# Patient Record
Sex: FEMALE | Race: WHITE | NOT HISPANIC OR LATINO | Employment: FULL TIME | URBAN - METROPOLITAN AREA
[De-identification: names, ages, dates, MRNs, and addresses within clinical notes are randomized per-mention and may not be internally consistent; named-entity substitution may affect disease eponyms.]

---

## 2017-03-08 ENCOUNTER — OFFICE VISIT (OUTPATIENT)
Dept: INTERNAL MEDICINE | Facility: CLINIC | Age: 37
End: 2017-03-08

## 2017-03-08 VITALS
OXYGEN SATURATION: 98 % | SYSTOLIC BLOOD PRESSURE: 108 MMHG | HEART RATE: 82 BPM | WEIGHT: 127 LBS | RESPIRATION RATE: 18 BRPM | DIASTOLIC BLOOD PRESSURE: 70 MMHG | TEMPERATURE: 98.6 F | BODY MASS INDEX: 20.65 KG/M2

## 2017-03-08 DIAGNOSIS — D17.1 LIPOMA OF ANTERIOR CHEST WALL: ICD-10-CM

## 2017-03-08 DIAGNOSIS — G47.00 INSOMNIA, UNSPECIFIED TYPE: Primary | ICD-10-CM

## 2017-03-08 DIAGNOSIS — N64.4 BREAST PAIN, LEFT: ICD-10-CM

## 2017-03-08 PROCEDURE — 99213 OFFICE O/P EST LOW 20 MIN: CPT | Performed by: PHYSICIAN ASSISTANT

## 2017-03-08 RX ORDER — QUETIAPINE FUMARATE 100 MG/1
1 TABLET, FILM COATED ORAL
COMMUNITY
Start: 2015-11-03 | End: 2017-03-08 | Stop reason: SDUPTHER

## 2017-03-08 RX ORDER — QUETIAPINE FUMARATE 50 MG/1
100 TABLET, FILM COATED ORAL
Qty: 60 TABLET | Refills: 2 | Status: SHIPPED | OUTPATIENT
Start: 2017-03-08 | End: 2017-10-13 | Stop reason: SDUPTHER

## 2017-03-08 NOTE — PROGRESS NOTES
Subjective   Jeanne Bauman is a 36 y.o. female.   Chief Complaint   Patient presents with   • lipoma     left     History of Present Illness   Pt says that abdominal lipoma is starting to ache x several months and left side of body and left breast is aching.  It has not gotten larger.    Continues to take seroquel at night for insomnia.    The following portions of the patient's history were reviewed and updated as appropriate: allergies, current medications and problem list.    Review of Systems   Psychiatric/Behavioral: Positive for sleep disturbance.       Objective   Physical Exam   Constitutional: She appears well-developed and well-nourished.   HENT:   Head: Normocephalic and atraumatic.   Right Ear: External ear normal.   Left Ear: External ear normal.   Eyes: Conjunctivae are normal.   Cardiovascular: Normal rate, regular rhythm and normal heart sounds.  Exam reveals no gallop and no friction rub.    No murmur heard.  Pulmonary/Chest: Effort normal and breath sounds normal. Right breast exhibits no inverted nipple, no skin change and no tenderness. Left breast exhibits tenderness. Left breast exhibits no inverted nipple and no skin change. Breasts are asymmetrical.   Pt has prominent anterior lower ribs.  Lipoma over the left anterior ribs is soft, restricted to the skin.       Psychiatric: She has a normal mood and affect.   Vitals reviewed.      Assessment/Plan   Jeanne was seen today for lipoma.    Diagnoses and all orders for this visit:    Insomnia, unspecified type  -     QUEtiapine (SEROquel) 50 MG tablet; Take 2 tablets by mouth every night at bedtime.    Breast pain, left  -     Mammo diagnostic digital tomosynthesis left w CAD    Lipoma of anterior chest wall

## 2017-03-09 PROBLEM — D17.1 LIPOMA OF ANTERIOR CHEST WALL: Status: ACTIVE | Noted: 2017-03-09

## 2017-03-15 ENCOUNTER — HOSPITAL ENCOUNTER (OUTPATIENT)
Dept: MAMMOGRAPHY | Facility: HOSPITAL | Age: 37
Discharge: HOME | End: 2017-03-15
Admitting: PHYSICIAN ASSISTANT

## 2017-03-15 ENCOUNTER — TRANSCRIBE ORDERS (OUTPATIENT)
Dept: INTERNAL MEDICINE | Facility: CLINIC | Age: 37
End: 2017-03-15

## 2017-03-15 ENCOUNTER — HOSPITAL ENCOUNTER (OUTPATIENT)
Dept: ULTRASOUND IMAGING | Facility: HOSPITAL | Age: 37
Discharge: HOME | End: 2017-03-15

## 2017-03-15 DIAGNOSIS — R92.8 ABNORMAL MAMMOGRAM: Primary | ICD-10-CM

## 2017-03-15 DIAGNOSIS — N64.4 BREAST PAIN, LEFT: ICD-10-CM

## 2017-03-15 PROCEDURE — 76641 ULTRASOUND BREAST COMPLETE: CPT

## 2017-03-15 PROCEDURE — G0279 TOMOSYNTHESIS, MAMMO: HCPCS

## 2017-03-15 PROCEDURE — G0204 DX MAMMO INCL CAD BI: HCPCS

## 2017-03-15 PROCEDURE — 77062 BREAST TOMOSYNTHESIS BI: CPT | Performed by: RADIOLOGY

## 2017-03-15 PROCEDURE — 76641 ULTRASOUND BREAST COMPLETE: CPT | Performed by: RADIOLOGY

## 2017-03-15 PROCEDURE — 77066 DX MAMMO INCL CAD BI: CPT | Performed by: RADIOLOGY

## 2017-09-22 ENCOUNTER — HOSPITAL ENCOUNTER (OUTPATIENT)
Dept: MAMMOGRAPHY | Facility: HOSPITAL | Age: 37
Discharge: HOME | End: 2017-09-22
Admitting: PHYSICIAN ASSISTANT

## 2017-09-22 ENCOUNTER — TRANSCRIBE ORDERS (OUTPATIENT)
Dept: MAMMOGRAPHY | Facility: HOSPITAL | Age: 37
End: 2017-09-22

## 2017-09-22 DIAGNOSIS — R92.8 ABNORMAL MAMMOGRAM: Primary | ICD-10-CM

## 2017-09-22 DIAGNOSIS — R92.8 ABNORMAL MAMMOGRAM: ICD-10-CM

## 2017-09-22 PROCEDURE — G0206 DX MAMMO INCL CAD UNI: HCPCS

## 2017-09-22 PROCEDURE — 77065 DX MAMMO INCL CAD UNI: CPT | Performed by: RADIOLOGY

## 2017-10-13 ENCOUNTER — OFFICE VISIT (OUTPATIENT)
Dept: INTERNAL MEDICINE | Facility: CLINIC | Age: 37
End: 2017-10-13

## 2017-10-13 VITALS
TEMPERATURE: 97.9 F | BODY MASS INDEX: 20.49 KG/M2 | OXYGEN SATURATION: 99 % | DIASTOLIC BLOOD PRESSURE: 66 MMHG | WEIGHT: 126 LBS | HEART RATE: 68 BPM | RESPIRATION RATE: 16 BRPM | SYSTOLIC BLOOD PRESSURE: 96 MMHG

## 2017-10-13 DIAGNOSIS — G47.00 INSOMNIA, UNSPECIFIED TYPE: ICD-10-CM

## 2017-10-13 PROCEDURE — 99213 OFFICE O/P EST LOW 20 MIN: CPT | Performed by: PHYSICIAN ASSISTANT

## 2017-10-13 RX ORDER — QUETIAPINE FUMARATE 50 MG/1
50-100 TABLET, FILM COATED ORAL
Qty: 45 TABLET | Refills: 5 | Status: SHIPPED | OUTPATIENT
Start: 2017-10-13 | End: 2020-03-13

## 2017-10-13 NOTE — PROGRESS NOTES
Nish Bauman is a 37 y.o. female.   Chief Complaint   Patient presents with   • Insomnia     f/u       History of Present Illness   Pt here for insomnia f/u. Takes seroquel 1-2 tab per night every night.  It helps her mood.      The following portions of the patient's history were reviewed and updated as appropriate: allergies, current medications and problem list.    Review of Systems   Psychiatric/Behavioral: Positive for sleep disturbance (controlled on abx).       Objective   Physical Exam   Constitutional: She appears well-developed.   Vitals reviewed.      Assessment/Plan   Jeanne was seen today for insomnia.    Diagnoses and all orders for this visit:    Insomnia, unspecified type  -     QUEtiapine (SEROquel) 50 MG tablet; Take 1-2 tablets by mouth every night at bedtime.

## 2018-03-29 ENCOUNTER — APPOINTMENT (OUTPATIENT)
Dept: MAMMOGRAPHY | Facility: HOSPITAL | Age: 38
End: 2018-03-29

## 2018-12-14 ENCOUNTER — LAB REQUISITION (OUTPATIENT)
Dept: LAB | Facility: HOSPITAL | Age: 38
End: 2018-12-14

## 2018-12-14 DIAGNOSIS — Z00.00 ROUTINE GENERAL MEDICAL EXAMINATION AT A HEALTH CARE FACILITY: ICD-10-CM

## 2018-12-14 LAB — HCG INTACT+B SERPL-ACNC: 18 MIU/ML

## 2018-12-14 PROCEDURE — 84702 CHORIONIC GONADOTROPIN TEST: CPT

## 2018-12-18 ENCOUNTER — LAB REQUISITION (OUTPATIENT)
Dept: LAB | Facility: HOSPITAL | Age: 38
End: 2018-12-18

## 2018-12-18 DIAGNOSIS — Z00.00 ROUTINE GENERAL MEDICAL EXAMINATION AT A HEALTH CARE FACILITY: ICD-10-CM

## 2018-12-18 LAB — HCG INTACT+B SERPL-ACNC: 55 MIU/ML

## 2018-12-18 PROCEDURE — 84702 CHORIONIC GONADOTROPIN TEST: CPT | Performed by: SPECIALIST

## 2019-12-02 ENCOUNTER — LAB REQUISITION (OUTPATIENT)
Dept: LAB | Facility: HOSPITAL | Age: 39
End: 2019-12-02

## 2019-12-02 DIAGNOSIS — Z00.00 ROUTINE GENERAL MEDICAL EXAMINATION AT A HEALTH CARE FACILITY: ICD-10-CM

## 2019-12-02 LAB — HCG INTACT+B SERPL-ACNC: 2159 MIU/ML

## 2019-12-02 PROCEDURE — 84702 CHORIONIC GONADOTROPIN TEST: CPT | Performed by: SPECIALIST

## 2019-12-09 ENCOUNTER — LAB REQUISITION (OUTPATIENT)
Dept: LAB | Facility: HOSPITAL | Age: 39
End: 2019-12-09

## 2019-12-09 DIAGNOSIS — Z00.00 ROUTINE GENERAL MEDICAL EXAMINATION AT A HEALTH CARE FACILITY: ICD-10-CM

## 2019-12-09 LAB — HCG INTACT+B SERPL-ACNC: 4483 MIU/ML

## 2019-12-09 PROCEDURE — 84702 CHORIONIC GONADOTROPIN TEST: CPT | Performed by: SPECIALIST

## 2019-12-10 ENCOUNTER — APPOINTMENT (OUTPATIENT)
Dept: ULTRASOUND IMAGING | Facility: HOSPITAL | Age: 39
End: 2019-12-10

## 2019-12-10 ENCOUNTER — HOSPITAL ENCOUNTER (EMERGENCY)
Facility: HOSPITAL | Age: 39
Discharge: HOME | End: 2019-12-10
Attending: EMERGENCY MEDICINE | Admitting: EMERGENCY MEDICINE

## 2019-12-10 VITALS
TEMPERATURE: 98.8 F | BODY MASS INDEX: 19.29 KG/M2 | HEIGHT: 66 IN | WEIGHT: 120 LBS | RESPIRATION RATE: 16 BRPM | HEART RATE: 82 BPM | OXYGEN SATURATION: 100 % | SYSTOLIC BLOOD PRESSURE: 130 MMHG | DIASTOLIC BLOOD PRESSURE: 74 MMHG

## 2019-12-10 DIAGNOSIS — Z98.890 HISTORY OF REVERSAL OF TUBAL LIGATION: ICD-10-CM

## 2019-12-10 DIAGNOSIS — Z3A.01 6 WEEKS GESTATION OF PREGNANCY: Primary | ICD-10-CM

## 2019-12-10 LAB — HCG INTACT+B SERPL-ACNC: 4802 MIU/ML

## 2019-12-10 PROCEDURE — 36415 COLL VENOUS BLD VENIPUNCTURE: CPT

## 2019-12-10 PROCEDURE — 76817 TRANSVAGINAL US OBSTETRIC: CPT

## 2019-12-10 PROCEDURE — 99283 EMERGENCY DEPT VISIT LOW MDM: CPT

## 2019-12-10 PROCEDURE — 84702 CHORIONIC GONADOTROPIN TEST: CPT | Performed by: PHYSICIAN ASSISTANT

## 2019-12-10 NOTE — ED PROVIDER NOTES
Nish Bauman is a 39 y.o. female who presents to the ED with c/o pregnancy problem. She states that she received a tubal reversal surgery approximately 1.5 years ago and is currently 6 weeks pregnant. Her lab results from yesterday reportedly showed an hCG level in the 4000's and her progesterone had reportedly dropped. The patient's OBGYN reportedly advised her to come to the ED to receive an ultrasound to ensure that she is not having an ectopic pregnancy or any other complications. Pt explains OB Dr. Georges in [place] is currently following patient since he performed the tubal reversal surgery but she lives in [place] and still needs to establish care here. The patient denies any abdominal pain or vaginal bleeding. There are no other acute complaints at this time.           History provided by:  Patient  Pregnancy Problem   The current episode started yesterday. Episode is moderate. Gestational age is 6 weeks. Patient reports no abdominal pain, no vaginal bleeding and no vaginal discharge. Primary symptoms: Hormone level change.   Associated symptoms include no fever, no headaches and no shortness of breath.       Review of Systems   Constitutional: Negative for fever.   Respiratory: Negative for shortness of breath.    Cardiovascular: Negative for chest pain and leg swelling.   Gastrointestinal: Negative for abdominal pain.   Genitourinary: Negative for difficulty urinating, pelvic pain, vaginal bleeding and vaginal discharge.   Neurological: Negative for dizziness and headaches.   All other systems reviewed and are negative.      History reviewed. No pertinent past medical history.    No Known Allergies    History reviewed. No pertinent surgical history.    Family History   Problem Relation Age of Onset   • Breast cancer Neg Hx    • Ovarian cancer Neg Hx        Social History     Socioeconomic History   • Marital status:      Spouse name: Not on file   • Number of children: Not on file    • Years of education: Not on file   • Highest education level: Not on file   Tobacco Use   • Smoking status: Never Smoker   Substance and Sexual Activity   • Alcohol use: Not Currently   • Drug use: Not Currently         Objective   Physical Exam   Constitutional: She appears well-developed and well-nourished.   HENT:   Head: Atraumatic.   Neck: Normal range of motion.   Cardiovascular: Normal rate.   Pulmonary/Chest: Effort normal. No respiratory distress.   Abdominal: Soft. There is no tenderness.   Neurological: She is alert.   Skin: Skin is warm.   Psychiatric: She has a normal mood and affect.   Nursing note and vitals reviewed.      Procedures         ED Course      Discussed results of US with patient and findings of a intrauterine gestational sac with measurements of age 5-6 weeks with no fetal heart rate. No evidence of extrauterine pregnancy. HCG quant 4,802. Pt will f/u with OB and list of [facility]'s will be given.     Recent Results (from the past 24 hour(s))   hCG, Quantitative, Pregnancy    Collection Time: 12/10/19  4:35 PM   Result Value Ref Range    HCG Quantitative 4,802.00 mIU/mL     Note: In addition to lab results from this visit, the labs listed above may include labs taken at another facility or during a different encounter within the last 24 hours. Please correlate lab times with ED admission and discharge times for further clarification of the services performed during this visit.    US Ob Transvaginal   Final Result   The uterus is of normal size. There is a small intrauterine   gestational sac as well as a yolk sac. There is a subtle fetal pole age   based on crown-rump length measurement is 6 weeks, 1 day. Age based on   gestational sac size is 5 weeks, a fetal heart rate could not be   identified, but this may be due to the very small crown-rump length   measurement. There are multiple right ovarian cysts, the largest of   which measures 3.8 cm. Lastly, there is no evidence of an  "extrauterine   pregnancy.       D:  12/10/2019   E:  12/10/2019                This report was finalized on 12/10/2019 3:32 PM by Dr. Jude Terrazas MD.            Vitals:    12/10/19 1146 12/10/19 1842   BP: 133/74 130/74   BP Location: Left arm Left arm   Patient Position: Sitting Sitting   Pulse: 80 82   Resp: 16 16   Temp: 98.8 °F (37.1 °C) 98.8 °F (37.1 °C)   TempSrc: Oral Oral   SpO2: 99% 100%   Weight: 54.4 kg (120 lb)    Height: 167.6 cm (66\")      Medications - No data to display  ECG/EMG Results (last 24 hours)     ** No results found for the last 24 hours. **        No orders to display                     MDM    Final diagnoses:   6 weeks gestation of pregnancy   History of reversal of tubal ligation       Documentation assistance provided by jerry Ortiz.  Information recorded by the scribe was done at my direction and has been verified and validated by me.     Sinai Ortiz  12/10/19 9825       Shira Cid PA  12/10/19 2152    "

## 2020-03-13 ENCOUNTER — HOSPITAL ENCOUNTER (OUTPATIENT)
Dept: GENERAL RADIOLOGY | Facility: HOSPITAL | Age: 40
Discharge: HOME | End: 2020-03-13
Admitting: PHYSICIAN ASSISTANT

## 2020-03-13 ENCOUNTER — OFFICE VISIT (OUTPATIENT)
Dept: INTERNAL MEDICINE | Facility: CLINIC | Age: 40
End: 2020-03-13

## 2020-03-13 VITALS
WEIGHT: 123 LBS | SYSTOLIC BLOOD PRESSURE: 102 MMHG | BODY MASS INDEX: 19.77 KG/M2 | HEIGHT: 66 IN | RESPIRATION RATE: 16 BRPM | DIASTOLIC BLOOD PRESSURE: 68 MMHG | HEART RATE: 70 BPM | TEMPERATURE: 99 F

## 2020-03-13 DIAGNOSIS — F51.01 PRIMARY INSOMNIA: ICD-10-CM

## 2020-03-13 DIAGNOSIS — F41.9 ANXIETY: Primary | ICD-10-CM

## 2020-03-13 DIAGNOSIS — R00.2 HEART PALPITATIONS: ICD-10-CM

## 2020-03-13 DIAGNOSIS — Z83.2 FH: FACTOR V LEIDEN MUTATION: ICD-10-CM

## 2020-03-13 PROCEDURE — 99214 OFFICE O/P EST MOD 30 MIN: CPT | Performed by: PHYSICIAN ASSISTANT

## 2020-03-13 PROCEDURE — 93000 ELECTROCARDIOGRAM COMPLETE: CPT | Performed by: PHYSICIAN ASSISTANT

## 2020-03-13 PROCEDURE — 71046 X-RAY EXAM CHEST 2 VIEWS: CPT

## 2020-03-13 NOTE — PROGRESS NOTES
New Patient Office Visit      Patient Name: Jeanne Bauman    Chief Complaint:    Chief Complaint   Patient presents with   • Establish Care       History of Present Illness: Jeanne Bauman is a 39 y.o. female who is here today to establish care for primary care.  She is c/o about heart palpitations onset about 3 weeks ago with improvement in the last week.  Initially the episodes were daily would last for hours but were not accompanied by headaches, diaphoresis, high blood pressure.  No alleviating factors.  Sometimes aggravating factor included exercise but this was not consistent.  Patient denies past medical history of heart palpitations.  She is also concerned if she should begin samples of birth control pills provided by Dr. Topete a week and a half ago because he mentions something about heart patient's might not be good with birth control pills.  She has had anxiety over her recent miscarriage in December and reports the palpitations began after her initial menses mid February and worsened after she had another menstrual cycle 7 days later.  She is concerned about palpitation being related to anxiety over miscarriages versus a cardiac condition.          Subjective      Review of Systems:   Review of Systems   Constitutional: Negative for appetite change, chills, fatigue, fever, unexpected weight gain and unexpected weight loss.   HENT: Negative for ear pain, facial swelling, hearing loss and swollen glands.    Eyes: Negative for blurred vision, double vision, photophobia, redness and visual disturbance.   Respiratory: Negative for apnea, cough, chest tightness, shortness of breath, wheezing and stridor.    Cardiovascular: Positive for palpitations. Negative for chest pain and leg swelling.        No syncopal events or vision disturbances with palpitations.  Not related to time of day or position.  Not accompanied by chest pain or pedal edema.   Gastrointestinal: Negative for abdominal pain,  blood in stool, constipation, diarrhea, nausea, rectal pain, vomiting, GERD and indigestion.   Endocrine: Negative for cold intolerance, heat intolerance, polydipsia, polyphagia and polyuria.   Genitourinary: Negative for breast discharge, difficulty urinating, dysuria, frequency, hematuria, urgency and urinary incontinence.   Musculoskeletal: Negative for arthralgias, back pain, gait problem, myalgias and neck pain.   Skin: Negative for color change, rash and skin lesions.   Allergic/Immunologic: Negative for immunocompromised state.   Neurological: Negative for dizziness, tremors, seizures, syncope, speech difficulty, weakness, light-headedness, numbness, headache, memory problem and confusion.   Hematological: Negative for adenopathy. Does not bruise/bleed easily.   Psychiatric/Behavioral: Positive for stress. Negative for agitation, behavioral problems, decreased concentration, dysphoric mood, self-injury, sleep disturbance, suicidal ideas and depressed mood. The patient is nervous/anxious.         Nervous or anxious and stressed related to decision over whether to begin contraception again after 2 miscarriages following a reversal of her tubal ligation in 2018.       Past Medical History:   Past Medical History:   Diagnosis Date   • Ovarian cyst        Past Surgical History:   Past Surgical History:   Procedure Laterality Date   • TUBAL ABDOMINAL LIGATION         Family History:   Family History   Problem Relation Age of Onset   • Breast cancer Neg Hx    • Ovarian cancer Neg Hx        Social History:   Social History     Socioeconomic History   • Marital status:      Spouse name: Not on file   • Number of children: Not on file   • Years of education: Not on file   • Highest education level: Not on file   Tobacco Use   • Smoking status: Former Smoker   • Smokeless tobacco: Never Used   Substance and Sexual Activity   • Alcohol use: Yes     Comment: wine- occaionally   • Drug use: Not Currently        "      Medications:   No current outpatient medications on file.    Allergies:   No Known Allergies    Objective     Physical Exam:  Vital Signs:   Vitals:    03/13/20 1345   BP: 102/68   Pulse: 70   Resp: 16   Temp: 99 °F (37.2 °C)   TempSrc: Temporal   Weight: 55.8 kg (123 lb)   Height: 167.6 cm (66\")       Physical Exam   Constitutional: She is oriented to person, place, and time. She appears well-developed and well-nourished.   HENT:   Head: Normocephalic and atraumatic.   Right Ear: External ear normal.   Left Ear: External ear normal.   Nose: Nose normal.   Mouth/Throat: Oropharynx is clear and moist.   Eyes: Pupils are equal, round, and reactive to light. Conjunctivae and EOM are normal.   Neck: Normal range of motion. Neck supple. No thyromegaly present.   Cardiovascular: Normal rate, regular rhythm and normal heart sounds. Exam reveals no gallop.   No murmur heard.  Pulmonary/Chest: Breath sounds normal. No stridor. No respiratory distress. She exhibits no tenderness.   Abdominal: Soft. Bowel sounds are normal. She exhibits no distension. There is no tenderness. There is no rebound and no guarding. No hernia.   Musculoskeletal: Normal range of motion. She exhibits no edema, tenderness or deformity.   Lymphadenopathy:     She has no cervical adenopathy.   Neurological: She is alert and oriented to person, place, and time. She displays normal reflexes. No cranial nerve deficit. Coordination normal.   Skin: Skin is warm and dry. Capillary refill takes less than 2 seconds. No rash noted.   Psychiatric: She has a normal mood and affect. Her behavior is normal. Thought content normal.   Nursing note and vitals reviewed.      Assessment / Plan      Assessment/Plan:   Jeanne was seen today for establish care.    Diagnoses and all orders for this visit:    Anxiety  -     XR Chest 2 View  -     ECG 12 Lead    Heart palpitations  -     XR Chest 2 View  -     ECG 12 Lead  -     Ambulatory Referral to Cardiology  -     ECG " 12 Lead  After reviewing past medical history back to  it was noted there were complaints of palpitations in the past with unremarkable EKG.    Primary insomnia    FH: factor V Leiden mutation  Past medical history reviewed and I cannot find evidence of coagulation disorder investigation however patient is  3 pregnancies and 2 miscarriages no strongly suspect there has been some work-up in the past.  And requesting records from Dr. Bolanos to confirm however.       ECG 12 Lead  Date/Time: 3/13/2020 3:14 PM  Performed by: Nela Martin PA  Authorized by: Nela Martin PA   Comparison: not compared with previous ECG   Rhythm: sinus rhythm  Rate: normal  BPM: 71  Conduction: conduction normal  QRS axis: normal  Other findings: non-specific ST-T wave changes    Clinical impression: non-specific ECG            Follow Up:   Return in about 3 weeks (around 4/3/2020) for BRY Dr ANTOINETTE Mccarty, [facility]- labs please, Follow Up.       [facility]      Please note that portions of this note may have been completed with a voice recognition program. Efforts were made to edit the dictations, but occasionally words are mistranscribed.

## 2020-03-13 NOTE — PATIENT INSTRUCTIONS
Contact Dr Mccarty's office ( GYN) regarding your questions about whether to begin the birth control pill samples he provided you with in light of family history of Factor V Leiden that patient is unsure she told him about.

## 2020-06-23 ENCOUNTER — OFFICE VISIT (OUTPATIENT)
Dept: INTERNAL MEDICINE | Facility: CLINIC | Age: 40
End: 2020-06-23

## 2020-06-23 ENCOUNTER — TRANSCRIBE ORDERS (OUTPATIENT)
Dept: INTERNAL MEDICINE | Facility: CLINIC | Age: 40
End: 2020-06-23

## 2020-06-23 VITALS
DIASTOLIC BLOOD PRESSURE: 70 MMHG | OXYGEN SATURATION: 99 % | SYSTOLIC BLOOD PRESSURE: 100 MMHG | RESPIRATION RATE: 18 BRPM | BODY MASS INDEX: 20.18 KG/M2 | HEART RATE: 62 BPM | WEIGHT: 125 LBS | TEMPERATURE: 98.7 F

## 2020-06-23 DIAGNOSIS — R92.8 ABNORMAL MAMMOGRAM OF LEFT BREAST: Primary | ICD-10-CM

## 2020-06-23 DIAGNOSIS — R92.8 ABNORMAL MAMMOGRAPHY: Primary | ICD-10-CM

## 2020-06-23 PROCEDURE — 99213 OFFICE O/P EST LOW 20 MIN: CPT | Performed by: PHYSICIAN ASSISTANT

## 2020-06-23 RX ORDER — PRENATAL VIT NO.126/IRON/FOLIC 28MG-0.8MG
1 TABLET ORAL DAILY
COMMUNITY

## 2020-06-23 NOTE — PROGRESS NOTES
Follow Up Office Visit      Patient Name: Jeanne Bauman    Chief Complaint:    Chief Complaint   Patient presents with   • Anxiety   • Breast Pain     left side would like mammogram       History of Present Illness: Jeanne Bauman is a 39 y.o. female  here for f/u on c/o about possible repeat mammogram. Has had intermittent pain left breast since @ 2 yrs ago and Marcela Barnes sent her for a mammo. Pt believes she was supposed ot have had a f/u mammo. She was seen by this provider earlier this year for c/o beginning OCP but decided not to take them for regulating her menstrual cycles.  She had a tubal ligation several years ago with a reversal almost 2 years ago and has been using a prenatal interim.          Subjective      Review of Systems:   Review of Systems   Constitutional: Negative for unexpected weight loss.   Cardiovascular: Negative for chest pain, palpitations and leg swelling.   Genitourinary: Positive for breast pain. Negative for breast discharge, breast lump, decreased libido, difficulty urinating, dyspareunia, dysuria, flank pain, genital sores, hematuria, menstrual problem, pelvic pain, vaginal discharge and vaginal pain.       PMH, Surgical, Meds and Allergies reviewed and updated as appropriate    Allergies:   No Known Allergies    Objective     Physical Exam:  Vital Signs:   Vitals:    06/23/20 1552   BP: 100/70   BP Location: Right arm   Patient Position: Sitting   Cuff Size: Adult   Pulse: 62   Resp: 18   Temp: 98.7 °F (37.1 °C)   TempSrc: Temporal   SpO2: 99%   Weight: 56.7 kg (125 lb)   PainSc:   4   PainLoc: Breast       Physical Exam   Constitutional: She is oriented to person, place, and time. She appears well-developed and well-nourished. No distress.   Cardiovascular: Normal rate, regular rhythm and normal heart sounds.   Pulmonary/Chest: Effort normal and breath sounds normal.   Neurological: She is alert and oriented to person, place, and time.   Skin: Skin is warm and dry.    Nursing note and vitals reviewed.  Breast exam deferred as patient has already had a GYN exam this year and has scheduled follow-up with her GYN.    Assessment / Plan        Results Review:    March 2017 diagnostic mammo vidya and L breast US:    Recommend clinical follow-up of the left breast.  Otherwise, recommend a 6 month mammographic follow-up of the left breast  to include left CC and ML magnification views as performed today.             Assessment/Plan:   Hope was seen today for anxiety and breast pain.    Diagnoses and all orders for this visit:    Abnormal mammogram of left breast  -     Mammo Diagnostic Digital Tomosynthesis Left With CAD; Future         Follow Up:   No follow-ups on file.    Discussed options for and developed POC with pt, risks/benefits, and all questions answered.        CASSIUS Garcia  [facility]      Please note that portions of this note may have been completed with a voice recognition program. Efforts were made to edit the dictations, but occasionally words are mistranscribed.

## 2020-07-20 ENCOUNTER — HOSPITAL ENCOUNTER (OUTPATIENT)
Dept: MAMMOGRAPHY | Facility: HOSPITAL | Age: 40
Discharge: HOME | End: 2020-07-20
Admitting: PHYSICIAN ASSISTANT

## 2020-07-20 DIAGNOSIS — R92.8 ABNORMAL MAMMOGRAPHY: ICD-10-CM

## 2020-07-20 PROCEDURE — G0279 TOMOSYNTHESIS, MAMMO: HCPCS

## 2020-07-20 PROCEDURE — 77066 DX MAMMO INCL CAD BI: CPT | Performed by: RADIOLOGY

## 2020-07-20 PROCEDURE — 77062 BREAST TOMOSYNTHESIS BI: CPT | Performed by: RADIOLOGY

## 2020-07-20 PROCEDURE — 77066 DX MAMMO INCL CAD BI: CPT

## 2020-07-20 NOTE — PROGRESS NOTES
Please let pt know her breast tissue is dense but no worrying findings. They suggest repeat at 43yo.   Karyn

## 2020-07-21 ENCOUNTER — TELEPHONE (OUTPATIENT)
Dept: INTERNAL MEDICINE | Facility: CLINIC | Age: 40
End: 2020-07-21

## 2020-09-01 ENCOUNTER — TELEPHONE (OUTPATIENT)
Dept: OBSTETRICS AND GYNECOLOGY | Facility: CLINIC | Age: 40
End: 2020-09-01

## 2020-09-08 ENCOUNTER — TELEPHONE (OUTPATIENT)
Dept: OBSTETRICS AND GYNECOLOGY | Facility: CLINIC | Age: 40
End: 2020-09-08

## 2020-09-16 ENCOUNTER — INITIAL PRENATAL (OUTPATIENT)
Dept: OBSTETRICS AND GYNECOLOGY | Facility: CLINIC | Age: 40
End: 2020-09-16

## 2020-09-16 VITALS — WEIGHT: 126.4 LBS | BODY MASS INDEX: 20.4 KG/M2 | DIASTOLIC BLOOD PRESSURE: 68 MMHG | SYSTOLIC BLOOD PRESSURE: 102 MMHG

## 2020-09-16 DIAGNOSIS — Z3A.01 LESS THAN 8 WEEKS GESTATION OF PREGNANCY: Primary | ICD-10-CM

## 2020-09-16 DIAGNOSIS — O09.521 MULTIGRAVIDA OF ADVANCED MATERNAL AGE IN FIRST TRIMESTER: ICD-10-CM

## 2020-09-16 PROBLEM — Z34.90 PREGNANCY: Status: ACTIVE | Noted: 2020-09-16

## 2020-09-16 PROCEDURE — 0501F PRENATAL FLOW SHEET: CPT | Performed by: OBSTETRICS & GYNECOLOGY

## 2020-09-16 NOTE — PATIENT INSTRUCTIONS
Prenatal Care  Prenatal care is health care during pregnancy. It helps you and your unborn baby (fetus) stay as healthy as possible. Prenatal care may be provided by a midwife, a family practice health care provider, or a childbirth and pregnancy specialist (obstetrician).  How does this affect me?  During pregnancy, you will be closely monitored for any new conditions that might develop. To lower your risk of pregnancy complications, you and your health care provider will talk about any underlying conditions you have.  How does this affect my baby?  Early and consistent prenatal care increases the chance that your baby will be healthy during pregnancy. Prenatal care lowers the risk that your baby will be:  · Born early (prematurely).  · Smaller than expected at birth (small for gestational age).  What can I expect at the first prenatal care visit?  Your first prenatal care visit will likely be the longest. You should schedule your first prenatal care visit as soon as you know that you are pregnant. Your first visit is a good time to talk about any questions or concerns you have about pregnancy. At your visit, you and your health care provider will talk about:  · Your medical history, including:  ? Any past pregnancies.  ? Your family's medical history.  ? The baby's father's medical history.  ? Any long-term (chronic) health conditions you have and how you manage them.  ? Any surgeries or procedures you have had.  ? Any current over-the-counter or prescription medicines, herbs, or supplements you are taking.  · Other factors that could pose a risk to your baby, including:  · Your home setting and your stress levels, including:  ? Exposure to abuse or violence.  ? Household financial strain.  ? Mental health conditions you have.  · Your daily health habits, including diet and exercise.  Your health care provider will also:  · Measure your weight, height, and blood pressure.  · Do a physical exam, including a pelvic  and breast exam.  · Perform blood tests and urine tests to check for:  ? Urinary tract infection.  ? Sexually transmitted infections (STIs).  ? Low iron levels in your blood (anemia).  ? Blood type and certain proteins on red blood cells (Rh antibodies).  ? Infections and immunity to viruses, such as hepatitis B and rubella.  ? HIV (human immunodeficiency virus).  · Do an ultrasound to confirm your baby's growth and development and to help predict your estimated due date (BEBETO). This ultrasound is done with a probe that is inserted into the vagina (transvaginal ultrasound).  · Discuss your options for genetic screening.  · Give you information about how to keep yourself and your baby healthy, including:  ? Nutrition and taking vitamins.  ? Physical activity.  ? How to manage pregnancy symptoms such as nausea and vomiting (morning sickness).  ? Infections and substances that may be harmful to your baby and how to avoid them.  ? Food safety.  ? Dental care.  ? Working.  ? Travel.  ? Warning signs to watch for and when to call your health care provider.  How often will I have prenatal care visits?  After your first prenatal care visit, you will have regular visits throughout your pregnancy. The visit schedule is often as follows:  · Up to week 28 of pregnancy: once every 4 weeks.  · 28-36 weeks: once every 2 weeks.  · After 36 weeks: every week until delivery.  Some women may have visits more or less often depending on any underlying health conditions and the health of the baby.  Keep all follow-up and prenatal care visits as told by your health care provider. This is important.  What happens during routine prenatal care visits?  Your health care provider will:  · Measure your weight and blood pressure.  · Check for fetal heart sounds.  · Measure the height of your uterus in your abdomen (fundal height). This may be measured starting around week 20 of pregnancy.  · Check the position of your baby inside your  uterus.  · Ask questions about your diet, sleeping patterns, and whether you can feel the baby move.  · Review warning signs to watch for and signs of labor.  · Ask about any pregnancy symptoms you are having and how you are dealing with them. Symptoms may include:  ? Headaches.  ? Nausea and vomiting.  ? Vaginal discharge.  ? Swelling.  ? Fatigue.  ? Constipation.  ? Any discomfort, including back or pelvic pain.  Make a list of questions to ask your health care provider at your routine visits.  What tests might I have during prenatal care visits?  You may have blood, urine, and imaging tests throughout your pregnancy, such as:  · Urine tests to check for glucose, protein, or signs of infection.  · Glucose tests to check for a form of diabetes that can develop during pregnancy (gestational diabetes mellitus). This is usually done around week 24 of pregnancy.  · An ultrasound to check your baby's growth and development and to check for birth defects. This is usually done around week 20 of pregnancy.  · A test to check for group B strep (GBS) infection. This is usually done around week 36 of pregnancy.  · Genetic testing. This may include blood or imaging tests, such as an ultrasound. Some genetic tests are done during the first trimester and some are done during the second trimester.  What else can I expect during prenatal care visits?  Your health care provider may recommend getting certain vaccines during pregnancy. These may include:  · A yearly flu shot (annual influenza vaccine). This is especially important if you will be pregnant during flu season.  · Tdap (tetanus, diphtheria, pertussis) vaccine. Getting this vaccine during pregnancy can protect your baby from whooping cough (pertussis) after birth. This vaccine may be recommended between weeks 27 and 36 of pregnancy.  Later in your pregnancy, your health care provider may give you information about:  · Childbirth and breastfeeding classes.  · Choosing a  health care provider for your baby.  · Umbilical cord banking.  · Breastfeeding.  · Birth control after your baby is born.  · The hospital labor and delivery unit and how to tour it.  · Registering at the hospital before you go into labor.  Where to find more information  · Office on Women's Health: womenshealth.gov  · American Pregnancy Association: americanpregnancy.org  · March of Dimes: marchofdimes.org  Summary  · Prenatal care helps you and your baby stay as healthy as possible during pregnancy.  · Your first prenatal care visit will most likely be the longest.  · You will have visits and tests throughout your pregnancy to monitor your health and your baby's health.  · Bring a list of questions to your visits to ask your health care provider.  · Make sure to keep all follow-up and prenatal care visits with your health care provider.  This information is not intended to replace advice given to you by your health care provider. Make sure you discuss any questions you have with your health care provider.  Document Released: 12/20/2004 Document Revised: 04/08/2020 Document Reviewed: 12/17/2018  Elsevier Patient Education © 2020 [employer]vier Inc.

## 2020-09-16 NOTE — PROGRESS NOTES
Initial ob visit     CC- Here for care of pregnancy        Jeanne Bauman is a 40 y.o. female, , who presents for her first obstetrical visit.  Her last LMP was Patient's last menstrual period was 2020..    OB History    Para Term  AB Living   6 3 3   1     SAB TAB Ectopic Molar Multiple Live Births     1              # Outcome Date GA Lbr Roland/2nd Weight Sex Delivery Anes PTL Lv   6 Current            5 TAB 19 7w0d    TAB   ND   4             3 Term            2 Term            1 Term                Initial positive test date : 2020  , UPT          Prior obstetric issues, potential pregnancy concerns: none  Family history of genetic issues (includes FOB): no  Prior infections concerning in pregnancy (Rash, fever in last 2 weeks): no  Varicella Hx - no  Prior testing for Cystic Fibrosis Carrier or Sickle Cell Trait- no  Prepregnancy BMI - Body mass index is 20.4 kg/m².  History of STD: no    Additional Pertinent History   Last Pap : >7 years ago - pre cancerous cells present (while pregnant), repeat was negative  Last Completed Pap Smear       Status Date      PAP SMEAR No completions recorded        History of abnormal Pap smear: yes - ascus  Family history of uterine, colon, breast, or ovarian cancer: no  Performs monthly Self-Breast Exam: yes  Exercises Regularly: yes  Feelings of Anxiety or Depression: no  Tobacco Usage?: No   Alcohol/Drug Use?: NO  Over the age of 35 at delivery: yes  Desires Genetic Screening: Cell Free DNA    PMH  Past Medical History:   Diagnosis Date   • Ovarian cyst        Current Outpatient Medications:   •  Prenatal Vit-Fe Fumarate-FA (PRENATAL, CLASSIC, VITAMIN) 28-0.8 MG tablet tablet, Take  by mouth Daily., Disp: , Rfl:     The additional following portions of the patient's history were reviewed and updated as appropriate: allergies, current medications, past family history, past medical history, past social history, past surgical history  and problem list.    Review of Systems   Review of Systems  Current obstetric complaints : Nausea and mild cramping.   All systems reviewed and otherwise normal.    I have reviewed and agree with the HPI, ROS, and historical information as entered above. Petey Mccarty MD    /68   Wt 57.3 kg (126 lb 6.4 oz)   LMP 07/28/2020   BMI 20.40 kg/m²     Physical Exam  General Appearance:    Alert, cooperative, in no acute distress,    Head:    Normocephalic, without obvious abnormality, atraumatic   Neck:   No adenopathy, supple, trachea midline, no thyromegaly   Back:     No kyphosis present, no scoliosis present,                       Lungs:     Clear to auscultation,respirations regular, even and     unlabored    Heart:    Regular rhythm and normal rate, normal S1 and S2, no            murmur, no gallop, no rub, no click   Breast Exam:    No masses, No nipple discharge   Abdomen:     Normal bowel sounds, no masses, no organomegaly, soft        non-tender, non-distended, no guarding, no rebound                 tenderness   Genitalia:    Vulva - BUS-WNL, NEFG    Vagina - No discharge, No bleeding    Cervix - No Lesions, closed     Uterus - Consistent with 7w2d weeks    Adnexa - No mass, NT    Pelvimetry - clinically adequate, gynecoid pelvis     Extremities:   Moves all extremities well, no edema, no cyanosis, no         redness   Pulses:   Pulses palpable and equal bilaterally   Skin:   No bleeding, bruising or rash   Lymph nodes:   No palpable adenopathy   Neurologic:   Sensation intact, A&O times 3      Physical Exam has been reviewed and confirmed by Petey Mccarty MD    Assessment/Plan   Assessment     Problem List Items Addressed This Visit        Other    Pregnancy - Primary    Relevant Orders    OB Panel With HIV    Urine Culture - , Urine, Clean Catch    Chlamydia trachomatis, Neisseria gonorrhoeae, Trichomonas vaginalis, PCR - Swab, Vagina    Pap IG, HPV-hr    Multigravida of advanced  maternal age in first trimester        1. Viable IUP on US today measuring 7w2d.    2. AMA    Plan     1. Reviewed routine prenatal care with the office and educational materials given  2. Lab(s) Ordered  3. Discussed options for genetic testing including first trimester nuchal translucency screen, genetic disease carrier testing, quadruple screen, and [place].  4. Nausea/Vomiting - she does not desire medications at this time.  Discussed conservative ways to help with nausea.  5. Patient is on Prenatal vitamins  6. Follow up: 4 week(s)  7. Will continue progesterone supplementation due to recent SAbs.       Petey Mccarty MD  09/16/2020

## 2020-09-17 LAB
ABO GROUP BLD: NORMAL
AMPHETAMINES UR QL SCN: NEGATIVE NG/ML
BARBITURATES UR QL SCN: NEGATIVE NG/ML
BASOPHILS # BLD AUTO: 0 X10E3/UL (ref 0–0.2)
BASOPHILS NFR BLD AUTO: 0 %
BENZODIAZ UR QL SCN: NEGATIVE NG/ML
BLD GP AB SCN SERPL QL: NEGATIVE
BZE UR QL SCN: NEGATIVE NG/ML
CANNABINOIDS UR QL SCN: NEGATIVE NG/ML
CREAT UR-MCNC: 47.9 MG/DL (ref 20–300)
EOSINOPHIL # BLD AUTO: 0.1 X10E3/UL (ref 0–0.4)
EOSINOPHIL NFR BLD AUTO: 1 %
ERYTHROCYTE [DISTWIDTH] IN BLOOD BY AUTOMATED COUNT: 12.2 % (ref 11.7–15.4)
HBV SURFACE AG SERPL QL IA: NEGATIVE
HCT VFR BLD AUTO: 39.1 % (ref 34–46.6)
HCV AB S/CO SERPL IA: <0.1 S/CO RATIO (ref 0–0.9)
HGB BLD-MCNC: 13.4 G/DL (ref 11.1–15.9)
HIV 1+2 AB+HIV1 P24 AG SERPL QL IA: NON REACTIVE
IMM GRANULOCYTES # BLD AUTO: 0 X10E3/UL (ref 0–0.1)
IMM GRANULOCYTES NFR BLD AUTO: 0 %
LABORATORY COMMENT REPORT: NORMAL
LYMPHOCYTES # BLD AUTO: 1.2 X10E3/UL (ref 0.7–3.1)
LYMPHOCYTES NFR BLD AUTO: 16 %
MCH RBC QN AUTO: 30.6 PG (ref 26.6–33)
MCHC RBC AUTO-ENTMCNC: 34.3 G/DL (ref 31.5–35.7)
MCV RBC AUTO: 89 FL (ref 79–97)
METHADONE UR QL SCN: NEGATIVE NG/ML
MONOCYTES # BLD AUTO: 0.8 X10E3/UL (ref 0.1–0.9)
MONOCYTES NFR BLD AUTO: 11 %
NEUTROPHILS # BLD AUTO: 5.2 X10E3/UL (ref 1.4–7)
NEUTROPHILS NFR BLD AUTO: 72 %
OPIATES UR QL SCN: NEGATIVE NG/ML
OXYCODONE+OXYMORPHONE UR QL SCN: NEGATIVE NG/ML
PCP UR QL: NEGATIVE NG/ML
PH UR: 6.2 [PH] (ref 4.5–8.9)
PLATELET # BLD AUTO: 240 X10E3/UL (ref 150–450)
PROPOXYPH UR QL SCN: NEGATIVE NG/ML
RBC # BLD AUTO: 4.38 X10E6/UL (ref 3.77–5.28)
RH BLD: POSITIVE
RPR SER QL: NON REACTIVE
RUBV IGG SERPL IA-ACNC: 1.66 INDEX
WBC # BLD AUTO: 7.3 X10E3/UL (ref 3.4–10.8)

## 2020-09-18 ENCOUNTER — TELEMEDICINE (OUTPATIENT)
Dept: INTERNAL MEDICINE | Facility: CLINIC | Age: 40
End: 2020-09-18

## 2020-09-18 VITALS — TEMPERATURE: 99.5 F | RESPIRATION RATE: 14 BRPM

## 2020-09-18 DIAGNOSIS — J06.9 ACUTE URI: ICD-10-CM

## 2020-09-18 DIAGNOSIS — O09.521 MULTIGRAVIDA OF ADVANCED MATERNAL AGE IN FIRST TRIMESTER: Primary | ICD-10-CM

## 2020-09-18 LAB
BACTERIA UR CULT: NO GROWTH
BACTERIA UR CULT: NORMAL
C TRACH RRNA SPEC QL NAA+PROBE: NEGATIVE
N GONORRHOEA RRNA SPEC QL NAA+PROBE: NEGATIVE
T VAGINALIS DNA SPEC QL NAA+PROBE: NEGATIVE

## 2020-09-18 PROCEDURE — 99213 OFFICE O/P EST LOW 20 MIN: CPT | Performed by: PHYSICIAN ASSISTANT

## 2020-09-18 NOTE — PROGRESS NOTES
Follow Up Office Visit      Patient Name: Jeanne Bauman    Chief Complaint:    Chief Complaint   Patient presents with   • URI   This was an audio and video enabled telemedicine encounter. Per Covid guidelines.   Zoom via Haiku platform used and pt was at their residence here in [place].      History of Present Illness: Jeanen Bauman is a 40 y.o. female  here for:  1. Her kids had a cold last week, they've recovered. She began w a sore throat and sinus pressure Tuesday night/Wed am started feeling tired but today sinuses feel better. She called yesterday bc sinus pain and thought infection. Overall feels worse than her normal cold but unsure if bc pregnant. Not coughing except to clear lower throat/upper chest. Denies wheeze, pleuritic pain, hemoptysis.   Still able to taste her food but smell has been a little stuffy so muted. Her thermometer is low normally and her otic temp is 99.5    Review of Systems   Constitutional: Positive for fatigue. Negative for fever.   HENT: Positive for sore throat. Negative for facial swelling.         Throat was more sore on Wed and Thurs. Able to eat and drink.    Eyes: Negative for visual disturbance.   Gastrointestinal: Positive for nausea. Negative for abdominal pain, diarrhea and vomiting.   Genitourinary:        She is pregnant, tired but not to this level. Saw Dr Topete her gyn on Tuesday   Neurological: Positive for weakness and headache.         PMH, Surgical, Meds and Allergies reviewed and updated as well as Care Team as appropriate    Objective     Vitals:    09/18/20 1340   Resp: 14   Temp: 99.5 °F (37.5 °C)       Physical Exam  Constitutional:       Comments: WNWD, WDWG, good eye contact. Speech articulate.    HENT:      Head: Normocephalic and atraumatic.   Eyes:      Conjunctiva/sclera: Conjunctivae normal.   Neck:      Musculoskeletal: Normal range of motion.   Pulmonary:      Effort: Pulmonary effort is normal.   Skin:     Findings: No rash.    Neurological:      Mental Status: She is alert and oriented to person, place, and time.      Coordination: Coordination normal.      Gait: Gait normal.   Psychiatric:         Behavior: Behavior normal.         Thought Content: Thought content normal.         Judgment: Judgment normal.         Assessment / Plan      Results Review:    GYN OV 9-26-20     Assessment and Plan:  Jeanne was seen today for uri.    Diagnoses and all orders for this visit:    Multigravida of advanced maternal age in first trimester    Acute URI  Comments:  Adviced sx treatment, Covid testing and Covid.gov [place] accessed and provided two sites for her to go and be tested close to home in [place]         Discussed options for and developed POC with pt for diagnoses or problems addressed today, risks/benefits, and all questions answered. Pt voices understanding.   Follow Up:   Return if symptoms worsen or fail to improve.      NOVA Martin PA-C, PT  [facility]      Please note that portions of this note may have been completed with a voice recognition program. Efforts were made to edit the dictations, but occasionally words are mistranscribed.

## 2020-09-24 NOTE — PATIENT INSTRUCTIONS
"Upper Respiratory Infection, Adult  An upper respiratory infection (URI) affects the nose, throat, and upper air passages. URIs are caused by germs (viruses). The most common type of URI is often called \"the common cold.\"  Medicines cannot cure URIs, but you can do things at home to relieve your symptoms. URIs usually get better within 7-10 days.  Follow these instructions at home:  Activity  · Rest as needed.  · If you have a fever, stay home from work or school until your fever is gone, or until your doctor says you may return to work or school.  ? You should stay home until you cannot spread the infection anymore (you are not contagious).  ? Your doctor may have you wear a face mask so you have less risk of spreading the infection.  Relieving symptoms  · Gargle with a salt-water mixture 3-4 times a day or as needed. To make a salt-water mixture, completely dissolve ½-1 tsp of salt in 1 cup of warm water.  · Use a cool-mist humidifier to add moisture to the air. This can help you breathe more easily.  Eating and drinking    · Drink enough fluid to keep your pee (urine) pale yellow.  · Eat soups and other clear broths.  General instructions    · Take over-the-counter and prescription medicines only as told by your doctor. These include cold medicines, fever reducers, and cough suppressants.  · Do not use any products that contain nicotine or tobacco. These include cigarettes and e-cigarettes. If you need help quitting, ask your doctor.  · Avoid being where people are smoking (avoid secondhand smoke).  · Make sure you get regular shots and get the flu shot every year.  · Keep all follow-up visits as told by your doctor. This is important.  How to avoid spreading infection to others    · Wash your hands often with soap and water. If you do not have soap and water, use hand .  · Avoid touching your mouth, face, eyes, or nose.  · Cough or sneeze into a tissue or your sleeve or elbow. Do not cough or sneeze " "into your hand or into the air.  Contact a doctor if:  · You are getting worse, not better.  · You have any of these:  ? A fever.  ? Chills.  ? Brown or red mucus in your nose.  ? Yellow or brown fluid (discharge)coming from your nose.  ? Pain in your face, especially when you bend forward.  ? Swollen neck glands.  ? Pain with swallowing.  ? White areas in the back of your throat.  Get help right away if:  · You have shortness of breath that gets worse.  · You have very bad or constant:  ? Headache.  ? Ear pain.  ? Pain in your forehead, behind your eyes, and over your cheekbones (sinus pain).  ? Chest pain.  · You have long-lasting (chronic) lung disease along with any of these:  ? Wheezing.  ? Long-lasting cough.  ? Coughing up blood.  ? A change in your usual mucus.  · You have a stiff neck.  · You have changes in your:  ? Vision.  ? Hearing.  ? Thinking.  ? Mood.  Summary  · An upper respiratory infection (URI) is caused by a germ called a virus. The most common type of URI is often called \"the common cold.\"  · URIs usually get better within 7-10 days.  · Take over-the-counter and prescription medicines only as told by your doctor.  This information is not intended to replace advice given to you by your health care provider. Make sure you discuss any questions you have with your health care provider.  Document Released: 06/05/2009 Document Revised: 12/26/2019 Document Reviewed: 08/10/2018  Elsevier Patient Education © 2020 Elsevier Inc.    "

## 2020-10-06 ENCOUNTER — TELEPHONE (OUTPATIENT)
Dept: OBSTETRICS AND GYNECOLOGY | Facility: CLINIC | Age: 40
End: 2020-10-06

## 2020-10-06 NOTE — TELEPHONE ENCOUNTER
10 weeks pregnant - left kidney pain for a few days - no fever. Prior hx of stones and stent. Apt tomorrow for CCUA and eval with Dr. Mccarty. Rest - keep hydrated today, can go to the ER for emergent pain.

## 2020-10-07 ENCOUNTER — ROUTINE PRENATAL (OUTPATIENT)
Dept: OBSTETRICS AND GYNECOLOGY | Facility: CLINIC | Age: 40
End: 2020-10-07

## 2020-10-07 VITALS — BODY MASS INDEX: 20.5 KG/M2 | WEIGHT: 127 LBS | SYSTOLIC BLOOD PRESSURE: 100 MMHG | DIASTOLIC BLOOD PRESSURE: 60 MMHG

## 2020-10-07 DIAGNOSIS — O23.41 URINARY TRACT INFECTION IN MOTHER DURING FIRST TRIMESTER OF PREGNANCY: ICD-10-CM

## 2020-10-07 DIAGNOSIS — R10.9 FLANK PAIN: ICD-10-CM

## 2020-10-07 DIAGNOSIS — Z3A.10 10 WEEKS GESTATION OF PREGNANCY: Primary | ICD-10-CM

## 2020-10-07 LAB
BILIRUB BLD-MCNC: NEGATIVE MG/DL
GLUCOSE UR STRIP-MCNC: NEGATIVE MG/DL
KETONES UR QL: NEGATIVE
LEUKOCYTE EST, POC: NEGATIVE
NITRITE UR-MCNC: NEGATIVE MG/ML
PH UR: 7.5 [PH] (ref 5–8)
PROT UR STRIP-MCNC: NEGATIVE MG/DL
RBC # UR STRIP: NEGATIVE /UL
SP GR UR: 1.02 (ref 1–1.03)
UROBILINOGEN UR QL: NORMAL

## 2020-10-07 PROCEDURE — 0502F SUBSEQUENT PRENATAL CARE: CPT | Performed by: OBSTETRICS & GYNECOLOGY

## 2020-10-07 RX ORDER — NITROFURANTOIN 25; 75 MG/1; MG/1
100 CAPSULE ORAL 2 TIMES DAILY
Qty: 14 CAPSULE | Refills: 0 | Status: SHIPPED | OUTPATIENT
Start: 2020-10-07 | End: 2020-10-14

## 2020-10-07 RX ORDER — NITROFURANTOIN 25; 75 MG/1; MG/1
100 CAPSULE ORAL 2 TIMES DAILY
Qty: 6 CAPSULE | Refills: 0 | OUTPATIENT
Start: 2020-10-07 | End: 2021-01-11

## 2020-10-07 RX ORDER — NITROFURANTOIN 25; 75 MG/1; MG/1
100 CAPSULE ORAL DAILY
Qty: 30 CAPSULE | Refills: 5 | Status: SHIPPED | OUTPATIENT
Start: 2020-10-07 | End: 2020-10-14 | Stop reason: SDUPTHER

## 2020-10-07 NOTE — PATIENT INSTRUCTIONS
Pregnancy and Urinary Tract Infection    A urinary tract infection (UTI) is an infection of any part of the urinary tract. This includes the kidneys, the tubes that connect your kidneys to your bladder (ureters), the bladder, and the tube that carries urine out of your body (urethra). These organs make, store, and get rid of urine in the body. Your health care provider may use other names to describe the infection. An upper UTI affects the ureters and kidneys (pyelonephritis). A lower UTI affects the bladder (cystitis) and urethra (urethritis).  Most urinary tract infections are caused by bacteria in your genital area, around the entrance to your urinary tract (urethra). These bacteria grow and cause irritation and inflammation of your urinary tract. You are more likely to develop a UTI during pregnancy because the physical and hormonal changes your body goes through can make it easier for bacteria to get into your urinary tract. Your growing baby also puts pressure on your bladder and can affect urine flow. It is important to recognize and treat UTIs in pregnancy because of the risk of serious complications for both you and your baby.  How does this affect me?  Symptoms of a UTI include:  · Needing to urinate right away (urgently).  · Frequent urination or passing small amounts of urine frequently.  · Pain or burning with urination.  · Blood in the urine.  · Urine that smells bad or unusual.  · Trouble urinating.  · Cloudy urine.  · Pain in the abdomen or lower back.  · Vaginal discharge.  You may also have:  · Vomiting or a decreased appetite.  · Confusion.  · Irritability or tiredness.  · A fever.  · Diarrhea.  How does this affect my baby?  An untreated UTI during pregnancy could lead to a kidney infection or a systemic infection, which can cause health problems that could affect your baby. Possible complications of an untreated UTI include:  · Giving birth to your baby before 37 weeks of pregnancy  (premature).  · Having a baby with a low birth weight.  · Developing high blood pressure during pregnancy (preeclampsia).  · Having a low hemoglobin level (anemia).  What can I do to lower my risk?  To prevent a UTI:  · Go to the bathroom as soon as you feel the need. Do not hold urine for long periods of time.  · Always wipe from front to back, especially after a bowel movement. Use each tissue one time when you wipe.  · Empty your bladder after sex.  · Keep your genital area dry.  · Drink 6-10 glasses of water each day.  · Do not douche or use deodorant sprays.  How is this treated?  Treatment for this condition may include:  · Antibiotic medicines that are safe to take during pregnancy.  · Other medicines to treat less common causes of UTI.  Follow these instructions at home:  · If you were prescribed an antibiotic medicine, take it as told by your health care provider. Do not stop using the antibiotic even if you start to feel better.  · Keep all follow-up visits as told by your health care provider. This is important.  Contact a health care provider if:  · Your symptoms do not improve or they get worse.  · You have abnormal vaginal discharge.  Get help right away if you:  · Have a fever.  · Have nausea and vomiting.  · Have back or side pain.  · Feel contractions in your uterus.  · Have lower belly pain.  · Have a gush of fluid from your vagina.  · Have blood in your urine.  Summary  · A urinary tract infection (UTI) is an infection of any part of the urinary tract, which includes the kidneys, ureters, bladder, and urethra.  · Most urinary tract infections are caused by bacteria in your genital area, around the entrance to your urinary tract (urethra).  · You are more likely to develop a UTI during pregnancy.  · If you were prescribed an antibiotic medicine, take it as told by your health care provider. Do not stop using the antibiotic even if you start to feel better.  This information is not intended to  replace advice given to you by your health care provider. Make sure you discuss any questions you have with your health care provider.  Document Released: 04/13/2012 Document Revised: 04/10/2020 Document Reviewed: 11/21/2019  Elsevier Patient Education © 2020 Elsevier Inc.

## 2020-10-07 NOTE — PROGRESS NOTES
ROS    OB FOLLOW UP    Jeanne Bauman is a 40 y.o.  10w2d patient being seen today for her obstetrical follow up visit. Patient reports backache and dysuria.   Patient is a 40 y.o. female  currently at 10w2d, who presents with L flank pain x2-3 days and 'sharp' intermittent pain with urination-states h/o kidney stones/UTI with previous pregnancy.     CCUA today negative. Pt states h/o L kidney infection with previous pregnancy-stent placed by urology at that time. She denies dysuria/frequency.     Her prenatal care is complicated by nothing.  Her previous obstetric/gynecological history is noted for is remarkable for .      The following portions of the patients history were reviewed and updated as appropriate:     Her prenatal care is complicated by (and status) :    Patient Active Problem List   Diagnosis   • Lipoma of anterior chest wall   • Insomnia   • Anxiety   • Heart palpitations   • FH: factor V Leiden mutation   • Abnormal mammogram of left breast   • Pregnancy   • Multigravida of advanced maternal age in first trimester       Desires genetic testing?: still considering    ROS -   Patient Reports : No Problems  Patient Denies: Loss of Fluid, Vaginal Spotting, Vision Changes, Headaches and Cramping/Contractions   Fetal Movement : none    /60   Wt 57.6 kg (127 lb)   LMP 2020   BMI 20.50 kg/m²     Ultrasound Today: no    EXAM:  Vitals: See prenatal flowsheet   Abdomen: See prenatal flowsheet   Urine glucose/protein: See prenatal flowsheet   Pelvic: See prenatal flowsheet     Assessment    1. Pregnancy at 10w2d  2. Labs reviewed from New OB Visit.    Problem List Items Addressed This Visit        Other    Pregnancy - Primary    Relevant Orders    POC Urinalysis Dipstick (Completed)      Other Visit Diagnoses     Flank pain        Relevant Orders    POC Urinalysis Dipstick (Completed)    Urine Culture - Urine, Urine, Clean Catch    Urinary tract infection in mother during  first trimester of pregnancy        Relevant Medications    nitrofurantoin, macrocrystal-monohydrate, (Macrobid) 100 MG capsule          Plan    1. UTI in pregnancy. Due to history of pyelonephritis with prior pregnancy, will treat aggressively with macrobid and continue suppression treatment.   2. Routine prenatal care.   3. Will consider genetic testing at f/u visit.     Petey Mccarty MD  10/07/2020

## 2020-10-14 RX ORDER — NITROFURANTOIN 25; 75 MG/1; MG/1
100 CAPSULE ORAL DAILY
Qty: 30 CAPSULE | Refills: 5 | Status: SHIPPED | OUTPATIENT
Start: 2020-10-14 | End: 2020-11-13

## 2020-10-21 ENCOUNTER — ROUTINE PRENATAL (OUTPATIENT)
Dept: OBSTETRICS AND GYNECOLOGY | Facility: CLINIC | Age: 40
End: 2020-10-21

## 2020-10-21 VITALS — DIASTOLIC BLOOD PRESSURE: 60 MMHG | BODY MASS INDEX: 20.98 KG/M2 | WEIGHT: 130 LBS | SYSTOLIC BLOOD PRESSURE: 100 MMHG

## 2020-10-21 DIAGNOSIS — O09.521 MULTIGRAVIDA OF ADVANCED MATERNAL AGE IN FIRST TRIMESTER: ICD-10-CM

## 2020-10-21 DIAGNOSIS — Z3A.12 12 WEEKS GESTATION OF PREGNANCY: Primary | ICD-10-CM

## 2020-10-21 LAB
EXPIRATION DATE: 0
GLUCOSE UR STRIP-MCNC: NEGATIVE MG/DL
Lab: 0
PROT UR STRIP-MCNC: NEGATIVE MG/DL

## 2020-10-21 PROCEDURE — 0502F SUBSEQUENT PRENATAL CARE: CPT | Performed by: OBSTETRICS & GYNECOLOGY

## 2020-10-21 NOTE — PROGRESS NOTES
OB FOLLOW UP    Jeanne Bauman is a 40 y.o.  12w2d patient being seen today for her obstetrical follow up visit. Patient reports nausea, cramping, headaches, constipation, and insomnia.  She does report that the nausea isn't as severe as previously and she is no longer vomiting and has been 2-3 weeks since she has. She would like genetic testing today.     Her prenatal care is complicated by (and status) :    Patient Active Problem List   Diagnosis   • Lipoma of anterior chest wall   • Insomnia   • Anxiety   • Heart palpitations   • FH: factor V Leiden mutation   • Abnormal mammogram of left breast   • Pregnancy   • Multigravida of advanced maternal age in first trimester   • Multigravida of advanced maternal age in first trimester       Desires genetic testing?: Yes with Gender    ROS -   Patient Reports : nausea, intermittent cramping, headaches, constipation - trying to increase water intake to help, insomnia  Patient Denies: vaginal bleeding, loss of fluids, edema, vomiting, cramping, karissa guy, contractions, vision changes, dysuria, heartburn  Fetal Movement : patient reports no fetal movement at this time    /60   Wt 59 kg (130 lb)   LMP 2020   BMI 20.98 kg/m²     Ultrasound Today: no    EXAM:  Vitals: See prenatal flowsheet   Abdomen: See prenatal flowsheet   Urine glucose/protein: See prenatal flowsheet   Pelvic: See prenatal flowsheet     Assessment    1. Pregnancy at 12w2d  2. Labs reviewed from New OB Visit.    Problem List Items Addressed This Visit        Other    Pregnancy - Primary    Relevant Orders    POC Protein, Urine, Qualitative, Dipstick (Completed)    POC Glucose, Urine, Qualitative, Dipstick (Completed)    [id number] PLUS Core+SCA+ESS - Blood,    Multigravida of advanced maternal age in first trimester          Plan    1. Routine prenatal care  2. Genetic testing performed today.    Petey Mccarty MD  10/21/2020

## 2020-10-21 NOTE — PATIENT INSTRUCTIONS
Genetic Testing During Pregnancy  Genetic testing during pregnancy is also called prenatal genetic testing. This type of testing can determine if your baby is at risk of being born with a disorder caused by abnormal genes or chromosomes (genetic disorder). Chromosomes contain genes that control how your baby will develop in your womb. There are many different genetic disorders. Examples of genetic disorders that may be found through genetic testing include Down syndrome and cystic fibrosis.  Gene changes (mutations) can be passed down through families. Genetic testing is offered to all women before or during pregnancy. You can choose whether to have genetic testing.  Why is genetic testing done?  Genetic testing is done during pregnancy to find out whether your child is at risk for a genetic disorder. Having genetic testing allows you to:  · Discuss your test results and options with a genetic counselor.  · Prepare for a baby that may be born with a genetic disorder. Learning about the disorder ahead of time helps you be better prepared to manage it. Your health care providers can also be prepared in case your baby requires special care before or after birth.  · Consider whether you want to continue with the pregnancy.  In some cases, genetic testing may be done to learn about the traits a child will inherit.  Types of genetic tests  There are two basic types of genetic testing. Screening tests indicate whether your developing baby (fetus) is at higher risk for a genetic disorder. Diagnostic tests check actual fetal cells to diagnose a genetic disorder.  Screening tests         Screening tests will not harm your baby. They are recommended for all pregnant women. Types of screening tests include:  · Carrier screening. This test involves checking genes from both parents by testing their blood or saliva. The test checks to find out if the parents carry a genetic mutation that may be passed to a baby. In most cases,  both parents must carry the mutation for a baby to be at risk.  · First trimester screening. This test combines a blood test with sound wave imaging of your baby (fetal ultrasound). This screening test checks for a risk of Down syndrome or other defects caused by having extra chromosomes. It also checks for defects of the heart, abdomen, or skeleton.  · Second trimester screening also combines a blood test with a fetal ultrasound exam. It checks for a risk of genetic defects of the face, brain, spine, heart, or limbs.  · Combined or sequential screening. This type of testing combines the results of first and second trimester screening. This type of testing may be more accurate than first or second trimester screening alone.  · Cell-free DNA testing. This is a blood test that detects cells released by the placenta that get into the mother's blood. It can be used to check for a risk of Down syndrome, other extra chromosome syndromes, and disorders caused by abnormal numbers of sex chromosomes. This test can be done any time after 10 weeks of pregnancy.    Diagnostic tests  Diagnostic tests carry slight risks of problems, including bleeding, infection, and loss of the pregnancy. These tests are done only if your baby is at risk for a genetic disorder. You may meet with a genetic counselor to discuss the risks and benefits before having diagnostic tests. Examples of diagnostic tests include:  · Chorionic villus sampling (CVS). This involves a procedure to remove and test a sample of cells taken from the placenta. The procedure may be done between 10 and 12 weeks of pregnancy.  · Amniocentesis. This involves a procedure to remove and test a sample of fluid (amniotic fluid) and cells from the sac that surrounds the developing baby. The procedure may be done between 15 and 20 weeks of pregnancy.  What do the results mean?  For a screening test:  · If the results are negative, it often means that your child is not at higher  risk. There is still a slight chance your child could have a genetic disorder.  · If the results are positive, it does not mean your child will have a genetic disorder. It may mean that your child has a higher-than-normal risk for a genetic disorder. In that case, you may want to talk with a genetic counselor about whether you should have diagnostic genetic tests.  For a diagnostic test:  · If the result is negative, it is unlikely that your child will have a genetic disorder.  · If the test is positive for a genetic disorder, it is likely that your child will have the disorder. The test may not tell how severe the disorder will be. Talk with your health care provider about your options.  Questions to ask your health care provider  Before talking to your health care provider about genetic testing, find out if there is a history of genetic disorders in your family. It may also help to know your family's ethnic origins. Then ask your health care provider the following questions:  · Is my baby at risk for a genetic disorder?  · What are the benefits of having genetic screening?  · What tests are best for me and my baby?  · What are the risks of each test?  · If I get a positive result on a screening test, what is the next step?  · Should I meet with a genetic counselor before having a diagnostic test?  · Should my partner or other members of my family be tested?  · How much do the tests cost? Will my insurance cover the testing?  Summary  · Genetic testing is done during pregnancy to find out whether your child is at risk for a genetic disorder.  · Genetic testing is offered to all women before or during pregnancy. You can choose whether to have genetic testing.  · There are two basic types of genetic testing. Screening tests indicate whether your developing baby (fetus) is at higher risk for a genetic disorder. Diagnostic tests check actual fetal cells to diagnose a genetic disorder.  · If a diagnostic genetic test is  positive, talk with your health care provider about your options.  This information is not intended to replace advice given to you by your health care provider. Make sure you discuss any questions you have with your health care provider.  Document Released: 03/04/2019 Document Revised: 04/09/2020 Document Reviewed: 03/04/2019  Elsevier Patient Education © 2020 [employer]

## 2020-12-04 ENCOUNTER — ROUTINE PRENATAL (OUTPATIENT)
Dept: OBSTETRICS AND GYNECOLOGY | Facility: CLINIC | Age: 40
End: 2020-12-04

## 2020-12-04 VITALS — DIASTOLIC BLOOD PRESSURE: 66 MMHG | WEIGHT: 134 LBS | SYSTOLIC BLOOD PRESSURE: 102 MMHG | BODY MASS INDEX: 21.63 KG/M2

## 2020-12-04 DIAGNOSIS — Z3A.18 18 WEEKS GESTATION OF PREGNANCY: Primary | ICD-10-CM

## 2020-12-04 LAB
GLUCOSE UR STRIP-MCNC: NEGATIVE MG/DL
PROT UR STRIP-MCNC: NEGATIVE MG/DL

## 2020-12-04 PROCEDURE — 90471 IMMUNIZATION ADMIN: CPT | Performed by: OBSTETRICS & GYNECOLOGY

## 2020-12-04 PROCEDURE — 90686 IIV4 VACC NO PRSV 0.5 ML IM: CPT | Performed by: OBSTETRICS & GYNECOLOGY

## 2020-12-04 PROCEDURE — 0502F SUBSEQUENT PRENATAL CARE: CPT | Performed by: OBSTETRICS & GYNECOLOGY

## 2020-12-04 NOTE — PROGRESS NOTES
OB FOLLOW UP    Jeanne Bauman is a 40 y.o.  18w4d patient being seen today for her obstetrical follow up visit. Patient reports fatigue, headache and heartburn.   We discussed tylenol and heat for tension type headaches. Will start tums for heartburn.     Her prenatal care is complicated by (and status) :    Patient Active Problem List   Diagnosis   • Lipoma of anterior chest wall   • Insomnia   • Anxiety   • Heart palpitations   • FH: factor V Leiden mutation   • Abnormal mammogram of left breast   • Pregnancy   • Multigravida of advanced maternal age in first trimester   • Multigravida of advanced maternal age in first trimester       ROS -   Patient Reports : Headaches  Patient Denies: Loss of Fluid, Vaginal Spotting, Vision Changes and Cramping/Contractions   Fetal Movement : normal      /66   Wt 60.8 kg (134 lb)   LMP 2020   BMI 21.63 kg/m²     Ultrasound Today: no    EXAM:  Vitals: See prenatal flowsheet   Abdomen: Gravid, nontender to palpation   Urine glucose/protein: See prenatal flowsheet   Pelvic: See prenatal flowsheet     Assessment    1. Pregnancy at 18w4d  2. Fetal status reassuring     Problem List Items Addressed This Visit        Other    Pregnancy - Primary    Relevant Orders    POC Urinalysis Dipstick (Completed)    US Ob 14 + Weeks Single or First Gestation          Plan    1. Fetal movement/ Labor Precautions  2. Fetal movement/PTL or Labor precautions  3. Reviewed routine prenatal care with the office and educational materials given  4. U/S ordered at follow up  5. Patient is on Prenatal vitamins  6. Follow up: 2 week(s)      Petey Mccarty MD  2020

## 2020-12-04 NOTE — PATIENT INSTRUCTIONS
Prenatal Care  Prenatal care is health care during pregnancy. It helps you and your unborn baby (fetus) stay as healthy as possible. Prenatal care may be provided by a midwife, a family practice health care provider, or a childbirth and pregnancy specialist (obstetrician).  How does this affect me?  During pregnancy, you will be closely monitored for any new conditions that might develop. To lower your risk of pregnancy complications, you and your health care provider will talk about any underlying conditions you have.  How does this affect my baby?  Early and consistent prenatal care increases the chance that your baby will be healthy during pregnancy. Prenatal care lowers the risk that your baby will be:  · Born early (prematurely).  · Smaller than expected at birth (small for gestational age).  What can I expect at the first prenatal care visit?  Your first prenatal care visit will likely be the longest. You should schedule your first prenatal care visit as soon as you know that you are pregnant. Your first visit is a good time to talk about any questions or concerns you have about pregnancy. At your visit, you and your health care provider will talk about:  · Your medical history, including:  ? Any past pregnancies.  ? Your family's medical history.  ? The baby's father's medical history.  ? Any long-term (chronic) health conditions you have and how you manage them.  ? Any surgeries or procedures you have had.  ? Any current over-the-counter or prescription medicines, herbs, or supplements you are taking.  · Other factors that could pose a risk to your baby, including:  · Your home setting and your stress levels, including:  ? Exposure to abuse or violence.  ? Household financial strain.  ? Mental health conditions you have.  · Your daily health habits, including diet and exercise.  Your health care provider will also:  · Measure your weight, height, and blood pressure.  · Do a physical exam, including a pelvic  and breast exam.  · Perform blood tests and urine tests to check for:  ? Urinary tract infection.  ? Sexually transmitted infections (STIs).  ? Low iron levels in your blood (anemia).  ? Blood type and certain proteins on red blood cells (Rh antibodies).  ? Infections and immunity to viruses, such as hepatitis B and rubella.  ? HIV (human immunodeficiency virus).  · Do an ultrasound to confirm your baby's growth and development and to help predict your estimated due date (BEBETO). This ultrasound is done with a probe that is inserted into the vagina (transvaginal ultrasound).  · Discuss your options for genetic screening.  · Give you information about how to keep yourself and your baby healthy, including:  ? Nutrition and taking vitamins.  ? Physical activity.  ? How to manage pregnancy symptoms such as nausea and vomiting (morning sickness).  ? Infections and substances that may be harmful to your baby and how to avoid them.  ? Food safety.  ? Dental care.  ? Working.  ? Travel.  ? Warning signs to watch for and when to call your health care provider.  How often will I have prenatal care visits?  After your first prenatal care visit, you will have regular visits throughout your pregnancy. The visit schedule is often as follows:  · Up to week 28 of pregnancy: once every 4 weeks.  · 28-36 weeks: once every 2 weeks.  · After 36 weeks: every week until delivery.  Some women may have visits more or less often depending on any underlying health conditions and the health of the baby.  Keep all follow-up and prenatal care visits as told by your health care provider. This is important.  What happens during routine prenatal care visits?  Your health care provider will:  · Measure your weight and blood pressure.  · Check for fetal heart sounds.  · Measure the height of your uterus in your abdomen (fundal height). This may be measured starting around week 20 of pregnancy.  · Check the position of your baby inside your  uterus.  · Ask questions about your diet, sleeping patterns, and whether you can feel the baby move.  · Review warning signs to watch for and signs of labor.  · Ask about any pregnancy symptoms you are having and how you are dealing with them. Symptoms may include:  ? Headaches.  ? Nausea and vomiting.  ? Vaginal discharge.  ? Swelling.  ? Fatigue.  ? Constipation.  ? Any discomfort, including back or pelvic pain.  Make a list of questions to ask your health care provider at your routine visits.  What tests might I have during prenatal care visits?  You may have blood, urine, and imaging tests throughout your pregnancy, such as:  · Urine tests to check for glucose, protein, or signs of infection.  · Glucose tests to check for a form of diabetes that can develop during pregnancy (gestational diabetes mellitus). This is usually done around week 24 of pregnancy.  · An ultrasound to check your baby's growth and development and to check for birth defects. This is usually done around week 20 of pregnancy.  · A test to check for group B strep (GBS) infection. This is usually done around week 36 of pregnancy.  · Genetic testing. This may include blood or imaging tests, such as an ultrasound. Some genetic tests are done during the first trimester and some are done during the second trimester.  What else can I expect during prenatal care visits?  Your health care provider may recommend getting certain vaccines during pregnancy. These may include:  · A yearly flu shot (annual influenza vaccine). This is especially important if you will be pregnant during flu season.  · Tdap (tetanus, diphtheria, pertussis) vaccine. Getting this vaccine during pregnancy can protect your baby from whooping cough (pertussis) after birth. This vaccine may be recommended between weeks 27 and 36 of pregnancy.  Later in your pregnancy, your health care provider may give you information about:  · Childbirth and breastfeeding classes.  · Choosing a  health care provider for your baby.  · Umbilical cord banking.  · Breastfeeding.  · Birth control after your baby is born.  · The hospital labor and delivery unit and how to tour it.  · Registering at the hospital before you go into labor.  Where to find more information  · Office on Women's Health: womenshealth.gov  · American Pregnancy Association: americanpregnancy.org  · March of Dimes: marchofdimes.org  Summary  · Prenatal care helps you and your baby stay as healthy as possible during pregnancy.  · Your first prenatal care visit will most likely be the longest.  · You will have visits and tests throughout your pregnancy to monitor your health and your baby's health.  · Bring a list of questions to your visits to ask your health care provider.  · Make sure to keep all follow-up and prenatal care visits with your health care provider.  This information is not intended to replace advice given to you by your health care provider. Make sure you discuss any questions you have with your health care provider.  Document Revised: 04/08/2020 Document Reviewed: 12/17/2018  Elsevier Patient Education © 2020 [employer]

## 2020-12-18 ENCOUNTER — ROUTINE PRENATAL (OUTPATIENT)
Dept: OBSTETRICS AND GYNECOLOGY | Facility: CLINIC | Age: 40
End: 2020-12-18

## 2020-12-18 VITALS — DIASTOLIC BLOOD PRESSURE: 64 MMHG | SYSTOLIC BLOOD PRESSURE: 110 MMHG | WEIGHT: 136.8 LBS | BODY MASS INDEX: 22.08 KG/M2

## 2020-12-18 DIAGNOSIS — Z3A.20 20 WEEKS GESTATION OF PREGNANCY: ICD-10-CM

## 2020-12-18 DIAGNOSIS — O09.522 MULTIGRAVIDA OF ADVANCED MATERNAL AGE IN SECOND TRIMESTER: Primary | ICD-10-CM

## 2020-12-18 PROCEDURE — 0502F SUBSEQUENT PRENATAL CARE: CPT | Performed by: OBSTETRICS & GYNECOLOGY

## 2020-12-18 NOTE — PROGRESS NOTES
OB FOLLOW UP    Jeanne Bauman is a 40 y.o.  20w4d patient being seen today for her obstetrical follow up visit. Patient reports no complaints.   Normal anatomy scan today but unable to obtain heart views. These results explained and all questions answered.     Her prenatal care is complicated by (and status) :    Patient Active Problem List   Diagnosis   • Lipoma of anterior chest wall   • Insomnia   • Anxiety   • Heart palpitations   • FH: factor V Leiden mutation   • Abnormal mammogram of left breast   • Pregnancy   • Multigravida of advanced maternal age in first trimester   • Multigravida of advanced maternal age in first trimester       ROS -   Patient Reports : No Problems  Patient Denies: Loss of Fluid, Vaginal Spotting, Vision Changes, Headaches and Cramping/Contractions   Fetal Movement : normal      /64   Wt 62.1 kg (136 lb 12.8 oz)   LMP 2020   BMI 22.08 kg/m²     Ultrasound Today: yes    EXAM:  Vitals: See prenatal flowsheet   Abdomen: Gravid, nontender to palpation   Urine glucose/protein: See prenatal flowsheet   Pelvic: See prenatal flowsheet     Assessment    1. Pregnancy at 20w4d  2. Fetal status reassuring     Problem List Items Addressed This Visit        Other    Pregnancy    Multigravida of advanced maternal age in first trimester - Primary          Plan    1. Fetal movement/ Labor Precautions  2. Fetal movement/PTL or Labor precautions  3. Reviewed routine prenatal care with the office and educational materials given  4. U/S ordered at follow up  5. Patient is on Prenatal vitamins  6. Follow up: 4 week(s)      Pteey Mccarty MD  2020

## 2021-01-11 PROCEDURE — U0004 COV-19 TEST NON-CDC HGH THRU: HCPCS | Performed by: NURSE PRACTITIONER

## 2021-01-20 ENCOUNTER — ROUTINE PRENATAL (OUTPATIENT)
Dept: OBSTETRICS AND GYNECOLOGY | Facility: CLINIC | Age: 41
End: 2021-01-20

## 2021-01-20 VITALS — BODY MASS INDEX: 22.47 KG/M2 | WEIGHT: 139.2 LBS | SYSTOLIC BLOOD PRESSURE: 102 MMHG | DIASTOLIC BLOOD PRESSURE: 64 MMHG

## 2021-01-20 DIAGNOSIS — O09.522 MULTIGRAVIDA OF ADVANCED MATERNAL AGE IN SECOND TRIMESTER: ICD-10-CM

## 2021-01-20 DIAGNOSIS — Z3A.25 25 WEEKS GESTATION OF PREGNANCY: Primary | ICD-10-CM

## 2021-01-20 PROBLEM — O09.529 AMA (ADVANCED MATERNAL AGE) MULTIGRAVIDA 35+: Status: ACTIVE | Noted: 2021-01-20

## 2021-01-20 LAB
GLUCOSE UR STRIP-MCNC: NEGATIVE MG/DL
PROT UR STRIP-MCNC: NEGATIVE MG/DL

## 2021-01-20 PROCEDURE — 0502F SUBSEQUENT PRENATAL CARE: CPT | Performed by: OBSTETRICS & GYNECOLOGY

## 2021-01-20 NOTE — PATIENT INSTRUCTIONS
Glucose Tolerance Test During Pregnancy  Why am I having this test?  The glucose tolerance test (GTT) is done to check how your body processes sugar (glucose). This is one of several tests used to diagnose diabetes that develops during pregnancy (gestational diabetes mellitus). Gestational diabetes is a temporary form of diabetes that some women develop during pregnancy. It usually occurs during the second trimester of pregnancy and goes away after delivery.  Testing (screening) for gestational diabetes usually occurs between 24 and 28 weeks of pregnancy. You may have the GTT test after having a 1-hour glucose screening test if the results from that test indicate that you may have gestational diabetes. You may also have this test if:  · You have a history of gestational diabetes.  · You have a history of giving birth to very large babies or have experienced repeated fetal loss (stillbirth).  · You have signs and symptoms of diabetes, such as:  ? Changes in your vision.  ? Tingling or numbness in your hands or feet.  ? Changes in hunger, thirst, and urination that are not otherwise explained by your pregnancy.  What is being tested?  This test measures the amount of glucose in your blood at different times during a period of 3 hours. This indicates how well your body is able to process glucose.  What kind of sample is taken?    Blood samples are required for this test. They are usually collected by inserting a needle into a blood vessel.  How do I prepare for this test?  · For 3 days before your test, eat normally. Have plenty of carbohydrate-rich foods.  · Follow instructions from your health care provider about:  ? Eating or drinking restrictions on the day of the test. You may be asked to not eat or drink anything other than water (fast) starting 8-10 hours before the test.  ? Changing or stopping your regular medicines. Some medicines may interfere with this test.  Tell a health care provider about:  · All  medicines you are taking, including vitamins, herbs, eye drops, creams, and over-the-counter medicines.  · Any blood disorders you have.  · Any surgeries you have had.  · Any medical conditions you have.  What happens during the test?  First, your blood glucose will be measured. This is referred to as your fasting blood glucose, since you fasted before the test. Then, you will drink a glucose solution that contains a certain amount of glucose. Your blood glucose will be measured again 1, 2, and 3 hours after drinking the solution.  This test takes about 3 hours to complete. You will need to stay at the testing location during this time. During the testing period:  · Do not eat or drink anything other than the glucose solution.  · Do not exercise.  · Do not use any products that contain nicotine or tobacco, such as cigarettes and e-cigarettes. If you need help stopping, ask your health care provider.  The testing procedure may vary among health care providers and hospitals.  How are the results reported?  Your results will be reported as milligrams of glucose per deciliter of blood (mg/dL) or millimoles per liter (mmol/L). Your health care provider will compare your results to normal ranges that were established after testing a large group of people (reference ranges). Reference ranges may vary among labs and hospitals. For this test, common reference ranges are:  · Fasting: less than  mg/dL (5.3-5.8 mmol/L).  · 1 hour after drinking glucose: less than 180-190 mg/dL (10.0-10.5 mmol/L).  · 2 hours after drinking glucose: less than 155-165 mg/dL (8.6-9.2 mmol/L).  · 3 hours after drinking glucose: 140-145 mg/dL (7.8-8.1 mmol/L).  What do the results mean?  Results within reference ranges are considered normal, meaning that your glucose levels are well-controlled. If two or more of your blood glucose levels are high, you may be diagnosed with gestational diabetes. If only one level is high, your health care  provider may suggest repeat testing or other tests to confirm a diagnosis.  Talk with your health care provider about what your results mean.  Questions to ask your health care provider  Ask your health care provider, or the department that is doing the test:  · When will my results be ready?  · How will I get my results?  · What are my treatment options?  · What other tests do I need?  · What are my next steps?  Summary  · The glucose tolerance test (GTT) is one of several tests used to diagnose diabetes that develops during pregnancy (gestational diabetes mellitus). Gestational diabetes is a temporary form of diabetes that some women develop during pregnancy.  · You may have the GTT test after having a 1-hour glucose screening test if the results from that test indicate that you may have gestational diabetes. You may also have this test if you have any symptoms or risk factors for gestational diabetes.  · Talk with your health care provider about what your results mean.  This information is not intended to replace advice given to you by your health care provider. Make sure you discuss any questions you have with your health care provider.  Document Revised: 04/09/2020 Document Reviewed: 07/30/2018  Elsevier Patient Education © 2020 [employer]

## 2021-02-17 ENCOUNTER — ROUTINE PRENATAL (OUTPATIENT)
Dept: OBSTETRICS AND GYNECOLOGY | Facility: CLINIC | Age: 41
End: 2021-02-17

## 2021-02-17 VITALS — DIASTOLIC BLOOD PRESSURE: 66 MMHG | SYSTOLIC BLOOD PRESSURE: 114 MMHG | BODY MASS INDEX: 22.92 KG/M2 | WEIGHT: 142 LBS

## 2021-02-17 DIAGNOSIS — Z3A.29 29 WEEKS GESTATION OF PREGNANCY: Primary | ICD-10-CM

## 2021-02-17 DIAGNOSIS — O09.523 MULTIGRAVIDA OF ADVANCED MATERNAL AGE IN THIRD TRIMESTER: ICD-10-CM

## 2021-02-17 LAB
GLUCOSE UR STRIP-MCNC: NEGATIVE MG/DL
PROT UR STRIP-MCNC: NEGATIVE MG/DL

## 2021-02-17 PROCEDURE — 0502F SUBSEQUENT PRENATAL CARE: CPT | Performed by: OBSTETRICS & GYNECOLOGY

## 2021-02-17 PROCEDURE — 90471 IMMUNIZATION ADMIN: CPT | Performed by: OBSTETRICS & GYNECOLOGY

## 2021-02-17 PROCEDURE — 90715 TDAP VACCINE 7 YRS/> IM: CPT | Performed by: OBSTETRICS & GYNECOLOGY

## 2021-02-17 NOTE — PROGRESS NOTES
OB FOLLOW UP    Jeanne Bauman is a 40 y.o.  29w2d patient being seen today for her obstetrical follow up visit. Patient reports SOB no cough, fever, chills, nausea or vomiting. No alleviating or worsening factors. She denies this happening in the past. O2 sat 96% with normal BP and pulse.     Her prenatal care is complicated by (and status) :    Patient Active Problem List   Diagnosis   • Lipoma of anterior chest wall   • Insomnia   • Anxiety   • Heart palpitations   • FH: factor V Leiden mutation   • Abnormal mammogram of left breast   • Pregnancy   • Multigravida of advanced maternal age in first trimester   • Multigravida of advanced maternal age in first trimester   • AMA (advanced maternal age) multigravida 35+       ROS -   Patient Reports : SOB  Patient Denies: Loss of Fluid, Vaginal Spotting, Vision Changes, Headaches and Cramping/Contractions   Fetal Movement : normal      /66   Wt 64.4 kg (142 lb)   LMP 2020   BMI 22.92 kg/m²     Ultrasound Today: no    EXAM:  Vitals: See prenatal flowsheet   Abdomen: Gravid, nontender to palpation   Urine glucose/protein: See prenatal flowsheet   Pelvic: See prenatal flowsheet     Assessment    1. Pregnancy at 29w2d  2. Fetal status reassuring     Problem List Items Addressed This Visit        Gravid and     Pregnancy - Primary    Relevant Orders    CBC (No Diff)    Antibody Screen    Gestational Screen 1 Hr ([facility])    POC Urinalysis Dipstick (Completed)    AMA (advanced maternal age) multigravida 35+          Plan    1. Fetal movement/ Labor Precautions  2. Fetal movement/PTL or Labor precautions  3. Reviewed routine prenatal care with the office and educational materials given  4. Lab(s) Ordered  5. Patient is on Prenatal vitamins  6. Follow up: 2 week(s)      Petey Mccarty MD  2021

## 2021-02-18 LAB
BLD GP AB SCN SERPL QL: NEGATIVE
ERYTHROCYTE [DISTWIDTH] IN BLOOD BY AUTOMATED COUNT: 11.9 % (ref 11.7–15.4)
GLUCOSE 1H P 50 G GLC PO SERPL-MCNC: 176 MG/DL (ref 65–139)
HCT VFR BLD AUTO: 33.8 % (ref 34–46.6)
HGB BLD-MCNC: 11.5 G/DL (ref 11.1–15.9)
Lab: NORMAL
MCH RBC QN AUTO: 31 PG (ref 26.6–33)
MCHC RBC AUTO-ENTMCNC: 34 G/DL (ref 31.5–35.7)
MCV RBC AUTO: 91 FL (ref 79–97)
PLATELET # BLD AUTO: 221 X10E3/UL (ref 150–450)
RBC # BLD AUTO: 3.71 X10E6/UL (ref 3.77–5.28)
WBC # BLD AUTO: 7.6 X10E3/UL (ref 3.4–10.8)

## 2021-02-18 RX ORDER — FERROUS SULFATE 325(65) MG
325 TABLET ORAL
Qty: 60 TABLET | Refills: 1 | Status: SHIPPED | OUTPATIENT
Start: 2021-02-18 | End: 2021-04-28 | Stop reason: HOSPADM

## 2021-02-19 ENCOUNTER — TELEPHONE (OUTPATIENT)
Dept: OBSTETRICS AND GYNECOLOGY | Facility: CLINIC | Age: 41
End: 2021-02-19

## 2021-02-19 NOTE — TELEPHONE ENCOUNTER
Pt. Notified of results. She is going out of town tomorrow and will be gone until the following Saturday. Instructed to come in that Monday when she gets back and to watch her carb intake while out of town. She VU

## 2021-03-01 ENCOUNTER — LAB (OUTPATIENT)
Dept: OBSTETRICS AND GYNECOLOGY | Facility: CLINIC | Age: 41
End: 2021-03-01

## 2021-03-01 DIAGNOSIS — R73.9 ELEVATED BLOOD SUGAR: Primary | ICD-10-CM

## 2021-03-02 LAB
GLUCOSE 1H P 100 G GLC PO SERPL-MCNC: 111 MG/DL (ref 65–179)
GLUCOSE 2H P 100 G GLC PO SERPL-MCNC: 102 MG/DL (ref 65–154)
GLUCOSE 3H P 100 G GLC PO SERPL-MCNC: 58 MG/DL (ref 65–139)
GLUCOSE P FAST SERPL-MCNC: 74 MG/DL (ref 65–94)

## 2021-03-03 ENCOUNTER — ROUTINE PRENATAL (OUTPATIENT)
Dept: OBSTETRICS AND GYNECOLOGY | Facility: CLINIC | Age: 41
End: 2021-03-03

## 2021-03-03 VITALS — DIASTOLIC BLOOD PRESSURE: 62 MMHG | BODY MASS INDEX: 23.11 KG/M2 | SYSTOLIC BLOOD PRESSURE: 108 MMHG | WEIGHT: 143.2 LBS

## 2021-03-03 DIAGNOSIS — O09.523 MULTIGRAVIDA OF ADVANCED MATERNAL AGE IN THIRD TRIMESTER: ICD-10-CM

## 2021-03-03 DIAGNOSIS — O36.5939 SGA (SMALL FOR GESTATIONAL AGE), FETAL, AFFECTING CARE OF MOTHER, ANTEPARTUM, THIRD TRIMESTER, OTHER FETUS: ICD-10-CM

## 2021-03-03 DIAGNOSIS — Z3A.31 31 WEEKS GESTATION OF PREGNANCY: Primary | ICD-10-CM

## 2021-03-03 LAB
GLUCOSE UR STRIP-MCNC: NEGATIVE MG/DL
PROT UR STRIP-MCNC: NEGATIVE MG/DL

## 2021-03-03 PROCEDURE — 0502F SUBSEQUENT PRENATAL CARE: CPT | Performed by: OBSTETRICS & GYNECOLOGY

## 2021-03-03 NOTE — PROGRESS NOTES
OB FOLLOW UP    Jeanne Bauman is a 40 y.o.  31w2d patient being seen today for her obstetrical follow up visit. Patient reports no complaints. She started iron supplementation due to symptomatic anemia and is feeling somewhat better.  Also discussed plan for f/u US to monitor fetal growth.     Her prenatal care is complicated by (and status) :    Patient Active Problem List   Diagnosis   • Lipoma of anterior chest wall   • Insomnia   • Anxiety   • Heart palpitations   • FH: factor V Leiden mutation   • Abnormal mammogram of left breast   • Pregnancy   • Multigravida of advanced maternal age in first trimester   • Multigravida of advanced maternal age in first trimester   • AMA (advanced maternal age) multigravida 35+       ROS -   Patient Reports : No Problems  Patient Denies: Loss of Fluid, Vaginal Spotting, Vision Changes, Headaches and Cramping/Contractions   Fetal Movement : normal      /62   Wt 65 kg (143 lb 3.2 oz)   LMP 2020   BMI 23.11 kg/m²     Ultrasound Today: no    EXAM:  Vitals: See prenatal flowsheet   Abdomen: Gravid, nontender to palpation   Urine glucose/protein: See prenatal flowsheet   Pelvic: See prenatal flowsheet     Assessment    1. Pregnancy at 31w2d  2. Fetal status reassuring     Problem List Items Addressed This Visit        Gravid and     Pregnancy - Primary    Relevant Orders    POC Urinalysis Dipstick (Completed)    AMA (advanced maternal age) multigravida 35+      Other Visit Diagnoses     SGA (small for gestational age), fetal, affecting care of mother, antepartum, third trimester, other fetus        Relevant Orders    US Ob 14 + Weeks Single or First Gestation          Plan    1. Fetal movement/ Labor Precautions  2. Fetal movement/PTL or Labor precautions  3. Reviewed routine prenatal care with the office and educational materials given  4. U/S ordered at follow up  5. Patient is on Prenatal vitamins  6. Follow up: 1 week(s) for visit and        Petey Mccarty MD  03/03/2021

## 2021-03-03 NOTE — PATIENT INSTRUCTIONS
Fetal Movement Counts  Patient Name: ________________________________________________ Patient Due Date: ____________________  What is a fetal movement count?    A fetal movement count is the number of times that you feel your baby move during a certain amount of time. This may also be called a fetal kick count. A fetal movement count is recommended for every pregnant woman. You may be asked to start counting fetal movements as early as week 28 of your pregnancy.  Pay attention to when your baby is most active. You may notice your baby's sleep and wake cycles. You may also notice things that make your baby move more. You should do a fetal movement count:  · When your baby is normally most active.  · At the same time each day.  A good time to count movements is while you are resting, after having something to eat and drink.  How do I count fetal movements?  1. Find a quiet, comfortable area. Sit, or lie down on your side.  2. Write down the date, the start time and stop time, and the number of movements that you felt between those two times. Take this information with you to your health care visits.  3. Write down your start time when you feel the first movement.  4. Count kicks, flutters, swishes, rolls, and jabs. You should feel at least 10 movements.  5. You may stop counting after you have felt 10 movements, or if you have been counting for 2 hours. Write down the stop time.  6. If you do not feel 10 movements in 2 hours, contact your health care provider for further instructions. Your health care provider may want to do additional tests to assess your baby's well-being.  Contact a health care provider if:  · You feel fewer than 10 movements in 2 hours.  · Your baby is not moving like he or she usually does.  Date: ____________ Start time: ____________ Stop time: ____________ Movements: ____________  Date: ____________ Start time: ____________ Stop time: ____________ Movements: ____________  Date: ____________  Start time: ____________ Stop time: ____________ Movements: ____________  Date: ____________ Start time: ____________ Stop time: ____________ Movements: ____________  Date: ____________ Start time: ____________ Stop time: ____________ Movements: ____________  Date: ____________ Start time: ____________ Stop time: ____________ Movements: ____________  Date: ____________ Start time: ____________ Stop time: ____________ Movements: ____________  Date: ____________ Start time: ____________ Stop time: ____________ Movements: ____________  Date: ____________ Start time: ____________ Stop time: ____________ Movements: ____________  This information is not intended to replace advice given to you by your health care provider. Make sure you discuss any questions you have with your health care provider.  Document Revised: 08/06/2020 Document Reviewed: 08/06/2020  [employer] Patient Education © 2020 [employer].

## 2021-03-10 ENCOUNTER — ROUTINE PRENATAL (OUTPATIENT)
Dept: OBSTETRICS AND GYNECOLOGY | Facility: CLINIC | Age: 41
End: 2021-03-10

## 2021-03-10 VITALS — WEIGHT: 145.8 LBS | DIASTOLIC BLOOD PRESSURE: 66 MMHG | BODY MASS INDEX: 23.53 KG/M2 | SYSTOLIC BLOOD PRESSURE: 100 MMHG

## 2021-03-10 DIAGNOSIS — Z3A.32 32 WEEKS GESTATION OF PREGNANCY: Primary | ICD-10-CM

## 2021-03-10 DIAGNOSIS — R00.2 PALPITATIONS: ICD-10-CM

## 2021-03-10 LAB
GLUCOSE UR STRIP-MCNC: NEGATIVE MG/DL
PROT UR STRIP-MCNC: NEGATIVE MG/DL

## 2021-03-10 PROCEDURE — 0502F SUBSEQUENT PRENATAL CARE: CPT | Performed by: OBSTETRICS & GYNECOLOGY

## 2021-03-10 NOTE — PROGRESS NOTES
OB FOLLOW UP    Jeanne Bauman is a 40 y.o.  32w2d patient being seen today for her obstetrical follow up visit. Patient reports fatigue and heartburn. She is on iron for anemia and continues to have shortness of air and palpitations.  Discussed referral to cardiology for further evaluation.   Growth scan performed and normal growth and fluid. These results explained and all questions answered.     Her prenatal care is complicated by (and status) :    Patient Active Problem List   Diagnosis   • Lipoma of anterior chest wall   • Insomnia   • Anxiety   • Heart palpitations   • FH: factor V Leiden mutation   • Abnormal mammogram of left breast   • Pregnancy   • Multigravida of advanced maternal age in first trimester   • Multigravida of advanced maternal age in first trimester   • AMA (advanced maternal age) multigravida 35+       ROS -   Patient Reports : hear palpitations and SOA  Patient Denies: Loss of Fluid, Vaginal Spotting, Vision Changes, Headaches and Cramping/Contractions   Fetal Movement : normal      /66   Wt 66.1 kg (145 lb 12.8 oz)   LMP 2020   BMI 23.53 kg/m²     Ultrasound Today: yes    EXAM:  Vitals: See prenatal flowsheet   Abdomen: Gravid, nontender to palpation   Urine glucose/protein: See prenatal flowsheet   Pelvic: See prenatal flowsheet     Assessment    1. Pregnancy at 32w2d  2. Fetal status reassuring     Problem List Items Addressed This Visit        Gravid and     Pregnancy - Primary    Relevant Orders    POC Urinalysis Dipstick (Completed)      Other Visit Diagnoses     Palpitations        Relevant Orders    Ambulatory Referral to Cardiology          Plan    1. Fetal movement/ Labor Precautions  2. Fetal movement/PTL or Labor precautions  3. Reviewed routine prenatal care with the office and educational materials given  4. Patient is on Prenatal vitamins  5. Referral to Cardiology Ordered  6. Follow up: 2 week(s)      Petey Mccarty  MD  03/10/2021

## 2021-03-24 ENCOUNTER — ROUTINE PRENATAL (OUTPATIENT)
Dept: OBSTETRICS AND GYNECOLOGY | Facility: CLINIC | Age: 41
End: 2021-03-24

## 2021-03-24 VITALS — DIASTOLIC BLOOD PRESSURE: 76 MMHG | SYSTOLIC BLOOD PRESSURE: 110 MMHG | BODY MASS INDEX: 23.86 KG/M2 | WEIGHT: 147.8 LBS

## 2021-03-24 DIAGNOSIS — O09.523 MULTIGRAVIDA OF ADVANCED MATERNAL AGE IN THIRD TRIMESTER: ICD-10-CM

## 2021-03-24 DIAGNOSIS — Z3A.34 34 WEEKS GESTATION OF PREGNANCY: Primary | ICD-10-CM

## 2021-03-24 LAB
GLUCOSE UR STRIP-MCNC: NEGATIVE MG/DL
PROT UR STRIP-MCNC: NEGATIVE MG/DL

## 2021-03-24 PROCEDURE — 0502F SUBSEQUENT PRENATAL CARE: CPT | Performed by: OBSTETRICS & GYNECOLOGY

## 2021-03-24 NOTE — PATIENT INSTRUCTIONS
Prenatal Care  Prenatal care is health care during pregnancy. It helps you and your unborn baby (fetus) stay as healthy as possible. Prenatal care may be provided by a midwife, a family practice health care provider, or a childbirth and pregnancy specialist (obstetrician).  How does this affect me?  During pregnancy, you will be closely monitored for any new conditions that might develop. To lower your risk of pregnancy complications, you and your health care provider will talk about any underlying conditions you have.  How does this affect my baby?  Early and consistent prenatal care increases the chance that your baby will be healthy during pregnancy. Prenatal care lowers the risk that your baby will be:  · Born early (prematurely).  · Smaller than expected at birth (small for gestational age).  What can I expect at the first prenatal care visit?  Your first prenatal care visit will likely be the longest. You should schedule your first prenatal care visit as soon as you know that you are pregnant. Your first visit is a good time to talk about any questions or concerns you have about pregnancy. At your visit, you and your health care provider will talk about:  · Your medical history, including:  ? Any past pregnancies.  ? Your family's medical history.  ? The baby's father's medical history.  ? Any long-term (chronic) health conditions you have and how you manage them.  ? Any surgeries or procedures you have had.  ? Any current over-the-counter or prescription medicines, herbs, or supplements you are taking.  · Other factors that could pose a risk to your baby, including:  · Your home setting and your stress levels, including:  ? Exposure to abuse or violence.  ? Household financial strain.  ? Mental health conditions you have.  · Your daily health habits, including diet and exercise.  Your health care provider will also:  · Measure your weight, height, and blood pressure.  · Do a physical exam, including a pelvic  and breast exam.  · Perform blood tests and urine tests to check for:  ? Urinary tract infection.  ? Sexually transmitted infections (STIs).  ? Low iron levels in your blood (anemia).  ? Blood type and certain proteins on red blood cells (Rh antibodies).  ? Infections and immunity to viruses, such as hepatitis B and rubella.  ? HIV (human immunodeficiency virus).  · Do an ultrasound to confirm your baby's growth and development and to help predict your estimated due date (BEBETO). This ultrasound is done with a probe that is inserted into the vagina (transvaginal ultrasound).  · Discuss your options for genetic screening.  · Give you information about how to keep yourself and your baby healthy, including:  ? Nutrition and taking vitamins.  ? Physical activity.  ? How to manage pregnancy symptoms such as nausea and vomiting (morning sickness).  ? Infections and substances that may be harmful to your baby and how to avoid them.  ? Food safety.  ? Dental care.  ? Working.  ? Travel.  ? Warning signs to watch for and when to call your health care provider.  How often will I have prenatal care visits?  After your first prenatal care visit, you will have regular visits throughout your pregnancy. The visit schedule is often as follows:  · Up to week 28 of pregnancy: once every 4 weeks.  · 28-36 weeks: once every 2 weeks.  · After 36 weeks: every week until delivery.  Some women may have visits more or less often depending on any underlying health conditions and the health of the baby.  Keep all follow-up and prenatal care visits as told by your health care provider. This is important.  What happens during routine prenatal care visits?  Your health care provider will:  · Measure your weight and blood pressure.  · Check for fetal heart sounds.  · Measure the height of your uterus in your abdomen (fundal height). This may be measured starting around week 20 of pregnancy.  · Check the position of your baby inside your  uterus.  · Ask questions about your diet, sleeping patterns, and whether you can feel the baby move.  · Review warning signs to watch for and signs of labor.  · Ask about any pregnancy symptoms you are having and how you are dealing with them. Symptoms may include:  ? Headaches.  ? Nausea and vomiting.  ? Vaginal discharge.  ? Swelling.  ? Fatigue.  ? Constipation.  ? Any discomfort, including back or pelvic pain.  Make a list of questions to ask your health care provider at your routine visits.  What tests might I have during prenatal care visits?  You may have blood, urine, and imaging tests throughout your pregnancy, such as:  · Urine tests to check for glucose, protein, or signs of infection.  · Glucose tests to check for a form of diabetes that can develop during pregnancy (gestational diabetes mellitus). This is usually done around week 24 of pregnancy.  · An ultrasound to check your baby's growth and development and to check for birth defects. This is usually done around week 20 of pregnancy.  · A test to check for group B strep (GBS) infection. This is usually done around week 36 of pregnancy.  · Genetic testing. This may include blood or imaging tests, such as an ultrasound. Some genetic tests are done during the first trimester and some are done during the second trimester.  What else can I expect during prenatal care visits?  Your health care provider may recommend getting certain vaccines during pregnancy. These may include:  · A yearly flu shot (annual influenza vaccine). This is especially important if you will be pregnant during flu season.  · Tdap (tetanus, diphtheria, pertussis) vaccine. Getting this vaccine during pregnancy can protect your baby from whooping cough (pertussis) after birth. This vaccine may be recommended between weeks 27 and 36 of pregnancy.  Later in your pregnancy, your health care provider may give you information about:  · Childbirth and breastfeeding classes.  · Choosing a  health care provider for your baby.  · Umbilical cord banking.  · Breastfeeding.  · Birth control after your baby is born.  · The hospital labor and delivery unit and how to tour it.  · Registering at the hospital before you go into labor.  Where to find more information  · Office on Women's Health: womenshealth.gov  · American Pregnancy Association: americanpregnancy.org  · March of Dimes: marchofdimes.org  Summary  · Prenatal care helps you and your baby stay as healthy as possible during pregnancy.  · Your first prenatal care visit will most likely be the longest.  · You will have visits and tests throughout your pregnancy to monitor your health and your baby's health.  · Bring a list of questions to your visits to ask your health care provider.  · Make sure to keep all follow-up and prenatal care visits with your health care provider.  This information is not intended to replace advice given to you by your health care provider. Make sure you discuss any questions you have with your health care provider.  Document Revised: 04/08/2020 Document Reviewed: 12/17/2018  Elsevier Patient Education © 2021 [employer].

## 2021-03-24 NOTE — PROGRESS NOTES
OB FOLLOW UP    Jeanne Bauman is a 40 y.o.  34w2d patient being seen today for her obstetrical follow up visit. Patient reports heartburn. Disucssed prior palpitations and she is feeling better today. Discussed kick counts and labor precautions discussed. Also would like to proceed with induction of labor at 39 weeks.     Her prenatal care is complicated by (and status) :    Patient Active Problem List   Diagnosis   • Lipoma of anterior chest wall   • Insomnia   • Anxiety   • Heart palpitations   • FH: factor V Leiden mutation   • Abnormal mammogram of left breast   • Pregnancy   • Multigravida of advanced maternal age in first trimester   • Multigravida of advanced maternal age in first trimester   • AMA (advanced maternal age) multigravida 35+       ROS -   Patient Reports : heartburn  Patient Denies: Loss of Fluid, Vaginal Spotting, Vision Changes, Headaches and Cramping/Contractions   Fetal Movement : normal      /76   Wt 67 kg (147 lb 12.8 oz)   LMP 2020   BMI 23.86 kg/m²     Ultrasound Today: no    EXAM:  Vitals: See prenatal flowsheet   Abdomen: Gravid, nontender to palpation   Urine glucose/protein: See prenatal flowsheet   Pelvic: See prenatal flowsheet     Assessment    1. Pregnancy at 34w2d  2. Fetal status reassuring     Problem List Items Addressed This Visit        Gravid and     Pregnancy - Primary    Relevant Orders    POC Urinalysis Dipstick (Completed)    AMA (advanced maternal age) multigravida 35+          Plan    1. Fetal movement/ Labor Precautions  2. Fetal movement/PTL or Labor precautions  3. Reviewed routine prenatal care with the office and educational materials given  4. Referral to Cardiology Ordered  5. Follow up: 2 week(s) for visit and GBS      Petey Mccarty MD  2021

## 2021-04-07 ENCOUNTER — ROUTINE PRENATAL (OUTPATIENT)
Dept: OBSTETRICS AND GYNECOLOGY | Facility: CLINIC | Age: 41
End: 2021-04-07

## 2021-04-07 VITALS — BODY MASS INDEX: 24.18 KG/M2 | WEIGHT: 149.8 LBS | SYSTOLIC BLOOD PRESSURE: 102 MMHG | DIASTOLIC BLOOD PRESSURE: 64 MMHG

## 2021-04-07 DIAGNOSIS — Z3A.36 36 WEEKS GESTATION OF PREGNANCY: Primary | ICD-10-CM

## 2021-04-07 LAB
GLUCOSE UR STRIP-MCNC: NEGATIVE MG/DL
PROT UR STRIP-MCNC: NEGATIVE MG/DL

## 2021-04-07 PROCEDURE — 0502F SUBSEQUENT PRENATAL CARE: CPT | Performed by: OBSTETRICS & GYNECOLOGY

## 2021-04-07 NOTE — PATIENT INSTRUCTIONS
Fetal Movement Counts  Patient Name: ________________________________________________ Patient Due Date: ____________________  What is a fetal movement count?    A fetal movement count is the number of times that you feel your baby move during a certain amount of time. This may also be called a fetal kick count. A fetal movement count is recommended for every pregnant woman. You may be asked to start counting fetal movements as early as week 28 of your pregnancy.  Pay attention to when your baby is most active. You may notice your baby's sleep and wake cycles. You may also notice things that make your baby move more. You should do a fetal movement count:  · When your baby is normally most active.  · At the same time each day.  A good time to count movements is while you are resting, after having something to eat and drink.  How do I count fetal movements?  1. Find a quiet, comfortable area. Sit, or lie down on your side.  2. Write down the date, the start time and stop time, and the number of movements that you felt between those two times. Take this information with you to your health care visits.  3. Write down your start time when you feel the first movement.  4. Count kicks, flutters, swishes, rolls, and jabs. You should feel at least 10 movements.  5. You may stop counting after you have felt 10 movements, or if you have been counting for 2 hours. Write down the stop time.  6. If you do not feel 10 movements in 2 hours, contact your health care provider for further instructions. Your health care provider may want to do additional tests to assess your baby's well-being.  Contact a health care provider if:  · You feel fewer than 10 movements in 2 hours.  · Your baby is not moving like he or she usually does.  Date: ____________ Start time: ____________ Stop time: ____________ Movements: ____________  Date: ____________ Start time: ____________ Stop time: ____________ Movements: ____________  Date: ____________  Start time: ____________ Stop time: ____________ Movements: ____________  Date: ____________ Start time: ____________ Stop time: ____________ Movements: ____________  Date: ____________ Start time: ____________ Stop time: ____________ Movements: ____________  Date: ____________ Start time: ____________ Stop time: ____________ Movements: ____________  Date: ____________ Start time: ____________ Stop time: ____________ Movements: ____________  Date: ____________ Start time: ____________ Stop time: ____________ Movements: ____________  Date: ____________ Start time: ____________ Stop time: ____________ Movements: ____________  This information is not intended to replace advice given to you by your health care provider. Make sure you discuss any questions you have with your health care provider.  Document Revised: 08/06/2020 Document Reviewed: 08/06/2020  [employer] Patient Education © 2021 [employer]

## 2021-04-07 NOTE — PROGRESS NOTES
OB FOLLOW UP    Jeanne Bauman is a 40 y.o.  36w2d patient being seen today for her obstetrical follow up visit. Patient reports backache, fatigue, headache, heartburn, nausea and occasional contractions. We discussed labor precautions and kick counts.   She would like induction at 39 weeks.     Her prenatal care is complicated by (and status) :    Patient Active Problem List   Diagnosis   • Lipoma of anterior chest wall   • Insomnia   • Anxiety   • Heart palpitations   • FH: factor V Leiden mutation   • Abnormal mammogram of left breast   • Pregnancy   • Multigravida of advanced maternal age in first trimester   • Multigravida of advanced maternal age in first trimester   • AMA (advanced maternal age) multigravida 35+       ROS -   Patient Reports : Cramping/Contractions   Patient Denies: Loss of Fluid, Vaginal Spotting and Vision Changes  Fetal Movement : normal    /64   Wt 67.9 kg (149 lb 12.8 oz)   LMP 2020   BMI 24.18 kg/m²     Ultrasound Today: no    EXAM:  Vitals: See prenatal flowsheet   Abdomen: Gravid, nontender to palpation   Urine glucose/protein: See prenatal flowsheet   Pelvic: See prenatal flowsheet     GBS Status: Done Today  Her Delivery Plan is: Desires IOL at 39wks. Needs to be scheduled    Assessment    1. Pregnancy at 36w2d  2. Fetal status reassuring     Problem List Items Addressed This Visit        Gravid and     Pregnancy - Primary    Relevant Orders    Group B Streptococcus Culture - Swab, Vaginal/Rectum    POC Urinalysis Dipstick (Completed)    Labor Induction          Plan    1. Routine prenatal care  2. Discussed kick counts and labor precautions.   3. Has cardiology consult this week for palpiations.   4. F/u in 1 week.   5. Will induce on 21    Petey Mccarty MD  2021

## 2021-04-08 ENCOUNTER — LAB (OUTPATIENT)
Dept: LAB | Facility: HOSPITAL | Age: 41
End: 2021-04-08

## 2021-04-08 DIAGNOSIS — Z3A.36 36 WEEKS GESTATION OF PREGNANCY: Primary | ICD-10-CM

## 2021-04-08 PROCEDURE — 87081 CULTURE SCREEN ONLY: CPT

## 2021-04-09 ENCOUNTER — OFFICE VISIT (OUTPATIENT)
Dept: CARDIOLOGY | Facility: CLINIC | Age: 41
End: 2021-04-09

## 2021-04-09 ENCOUNTER — TELEPHONE (OUTPATIENT)
Dept: OBSTETRICS AND GYNECOLOGY | Facility: CLINIC | Age: 41
End: 2021-04-09

## 2021-04-09 VITALS
BODY MASS INDEX: 24.23 KG/M2 | WEIGHT: 150.8 LBS | OXYGEN SATURATION: 97 % | SYSTOLIC BLOOD PRESSURE: 100 MMHG | HEIGHT: 66 IN | DIASTOLIC BLOOD PRESSURE: 60 MMHG | HEART RATE: 78 BPM

## 2021-04-09 DIAGNOSIS — R00.2 HEART PALPITATIONS: Primary | ICD-10-CM

## 2021-04-09 PROCEDURE — 99244 OFF/OP CNSLTJ NEW/EST MOD 40: CPT | Performed by: INTERNAL MEDICINE

## 2021-04-09 PROCEDURE — 93000 ELECTROCARDIOGRAM COMPLETE: CPT | Performed by: INTERNAL MEDICINE

## 2021-04-09 NOTE — PROGRESS NOTES
[facility] Cardiology   Consult  Jeanne Bauman  1980    VISIT DATE:  04/09/21    PCP:   Nela Martin PA  [address]  [facility] [id number]        CC:  Chest Pain, Palpitations, and Shortness of Breath        History of Present Illness:  Jeanne Bauman  Is a 40 y.o. female with pertinent cardiac history detailed above.  Patient is currently pregnant with planned delivery at the end of April.  .  EKG from 3/13/2020 reviewed: Showed sinus rhythm with nonspecific ST changes.  This is her 4th pregnancy, no issues with priors. She has been having some  sensation of dyspnea.   She also has been experiencing palpitations.  She feels these for days at a time.  Symptoms can occur daily.  She had palpitations pre pregnancy.  EKG today in office WNL.  She will get dizzy and some strained feeling in her arms with this.  No excessive edema. VSS WNL      Patient Active Problem List    Diagnosis Date Noted   • AMA (advanced maternal age) multigravida 35+ 01/20/2021   • Multigravida of advanced maternal age in first trimester 10/21/2020   • Pregnancy 09/16/2020   • Multigravida of advanced maternal age in first trimester 09/16/2020   • Anxiety 03/13/2020   • Heart palpitations 03/13/2020   • FH: factor V Leiden mutation 03/13/2020   • Insomnia 10/13/2017   • Abnormal mammogram of left breast 03/15/2017     Note Last Updated: 6/23/2020     Pt was to have repeat in Sept 2017 but failed to follow up. Still has intermittent L breast pain which was reasoning for original imaging in 2017.      • Lipoma of anterior chest wall 03/09/2017       No Known Allergies    Social History     Socioeconomic History   • Marital status:      Spouse name: Not on file   • Number of children: 3   • Years of education: Not on file   • Highest education level: Bachelor's degree (e.g., BA, AB, BS)   Tobacco Use   • Smoking status: Former Smoker     Packs/day: 0.25     Types: Cigarettes   • Smokeless  "tobacco: Never Used   • Tobacco comment: smoked in her teens casual   Substance and Sexual Activity   • Alcohol use: Not Currently     Alcohol/week: 1.0 standard drinks     Types: 1 Glasses of wine per week     Comment: occas but pregnant now so not drinking   • Drug use: Not Currently   • Sexual activity: Yes       Family History   Problem Relation Age of Onset   • Diabetes Mother    • Breast cancer Neg Hx    • Ovarian cancer Neg Hx        Current Medications:    Current Outpatient Medications:   •  ferrous sulfate 325 (65 FE) MG tablet, Take 1 tablet by mouth 2 (Two) Times a Day Before Meals for 150 days., Disp: 60 tablet, Rfl: 1  •  nitrofurantoin, macrocrystal-monohydrate, (MACROBID) 100 MG capsule, Take 100 mg by mouth 2 (Two) Times a Day. Taking low dose for UTI prevention while pregnant., Disp: , Rfl:   •  Prenatal Vit-Fe Fumarate-FA (PRENATAL, CLASSIC, VITAMIN) 28-0.8 MG tablet tablet, Take 1 tablet by mouth Daily., Disp: , Rfl:      Review of Systems   Cardiovascular: Positive for dyspnea on exertion, irregular heartbeat and palpitations. Negative for chest pain, orthopnea and syncope.   Respiratory: Positive for shortness of breath.    Neurological: Positive for dizziness.   All other systems reviewed and are negative.      Vitals:    04/09/21 0928   BP: 100/60   BP Location: Right arm   Patient Position: Sitting   Pulse: 78   SpO2: 97%   Weight: 68.4 kg (150 lb 12.8 oz)   Height: 167.6 cm (66\")       Physical Exam  Constitutional:       Appearance: Normal appearance.   HENT:      Head: Normocephalic and atraumatic.   Cardiovascular:      Rate and Rhythm: Normal rate and regular rhythm.      Pulses: Normal pulses.      Heart sounds: No murmur heard.     Pulmonary:      Effort: Pulmonary effort is normal.      Breath sounds: No rales.   Abdominal:      Comments: Gravid abdomen   Musculoskeletal:      Right lower leg: No edema.      Left lower leg: No edema.   Neurological:      General: No focal deficit " present.      Mental Status: She is alert.   Psychiatric:         Mood and Affect: Mood normal.         Diagnostic Data:    ECG 12 Lead    Date/Time: 4/9/2021 9:46 AM  Performed by: Rehan Huber MD  Authorized by: Rehan Huber MD   Comparison: compared with previous ECG from 3/13/2020  Similar to previous ECG  Rate: normal  BPM: 77  QRS axis: normal    Clinical impression: normal ECG          No results found for: CHLPL, TRIG, HDL, LDLDIRECT  Lab Results   Component Value Date    GLUCOSE 98 04/11/2016    BUN 12 04/11/2016    CREATININE 0.8 04/11/2016     04/11/2016    K 3.8 04/11/2016     04/11/2016    CO2 30 04/11/2016     No results found for: HGBA1C  Lab Results   Component Value Date    WBC 7.6 02/18/2021    HGB 11.5 02/18/2021    HCT 33.8 (L) 02/18/2021     02/18/2021       Assessment:   Diagnosis Plan   1. Heart palpitations  Holter Monitor - 48 Hour       Plan:    -48 hour Holter placed today to screen for arrhythmias  -echo ordered for next week to evaluate LV function for any other valvular abnormalities  -We will contact her with the results and plan to see her back for a postpartum visit        Rehan Huber MD [facility]

## 2021-04-09 NOTE — TELEPHONE ENCOUNTER
Unable to determine who called or why. Pt states there was no VM left. Informed nothing seems abnormal in her chart of why they may have been calling.

## 2021-04-10 LAB
BACTERIA SPEC AEROBE CULT: ABNORMAL
STREP GROUPING: ABNORMAL

## 2021-04-14 ENCOUNTER — ROUTINE PRENATAL (OUTPATIENT)
Dept: OBSTETRICS AND GYNECOLOGY | Facility: CLINIC | Age: 41
End: 2021-04-14

## 2021-04-14 VITALS — SYSTOLIC BLOOD PRESSURE: 116 MMHG | DIASTOLIC BLOOD PRESSURE: 78 MMHG | WEIGHT: 157 LBS | BODY MASS INDEX: 25.34 KG/M2

## 2021-04-14 DIAGNOSIS — Z3A.37 37 WEEKS GESTATION OF PREGNANCY: Primary | ICD-10-CM

## 2021-04-14 LAB
EXPIRATION DATE: 0
GLUCOSE UR STRIP-MCNC: NEGATIVE MG/DL
Lab: 0
PROT UR STRIP-MCNC: NEGATIVE MG/DL

## 2021-04-14 PROCEDURE — 0502F SUBSEQUENT PRENATAL CARE: CPT | Performed by: OBSTETRICS & GYNECOLOGY

## 2021-04-14 NOTE — PROGRESS NOTES
OB FOLLOW UP    Jeanne Bauman is a 40 y.o.  37w2d patient being seen today for her obstetrical follow up visit. Patient reports karissa guy. Patient reports having karissa guy for the last few days, she states they are not in any type of pattern and go away with rest.  We discussed kick counts and labor precautions today.     Her prenatal care is complicated by (and status) :    Patient Active Problem List   Diagnosis   • Lipoma of anterior chest wall   • Insomnia   • Anxiety   • Heart palpitations   • FH: factor V Leiden mutation   • Abnormal mammogram of left breast   • Pregnancy   • Multigravida of advanced maternal age in first trimester   • Multigravida of advanced maternal age in first trimester   • AMA (advanced maternal age) multigravida 35+       ROS -   Patient Reports : Cramping/Contractions   Patient Denies: Loss of Fluid, Vaginal Spotting, Vision Changes and Headaches  Fetal Movement : normal    /78   Wt 71.2 kg (157 lb)   LMP 2020   BMI 25.34 kg/m²     Ultrasound Today: no    EXAM:  Vitals: See prenatal flowsheet   Abdomen: Gravid, nontender to palpation   Urine glucose/protein: See prenatal flowsheet   Pelvic: See prenatal flowsheet     GBS Status: Was done on 2021.  The results are positive.   Her Delivery Plan is: patient is scheduled for induction on 2021    Assessment    1. Pregnancy at 37w2d  2. Fetal status reassuring     Problem List Items Addressed This Visit        Gravid and     Pregnancy - Primary    Relevant Orders    POC Glucose, Urine, Qualitative, Dipstick (Completed)    POC Protein, Urine, Qualitative, Dipstick (Completed)          Plan    1. Routine prenatal care  2. Kick counts and labor precautions discussed.   3. F/u in 1 week.   4. Induction scheduled for 39 weeks.     Petey Mccarty MD  2021

## 2021-04-16 ENCOUNTER — HOSPITAL ENCOUNTER (OUTPATIENT)
Dept: CARDIOLOGY | Facility: HOSPITAL | Age: 41
Discharge: HOME | End: 2021-04-16
Admitting: INTERNAL MEDICINE

## 2021-04-16 VITALS
DIASTOLIC BLOOD PRESSURE: 76 MMHG | SYSTOLIC BLOOD PRESSURE: 113 MMHG | WEIGHT: 157 LBS | BODY MASS INDEX: 25.23 KG/M2 | HEIGHT: 66 IN

## 2021-04-16 DIAGNOSIS — R00.2 HEART PALPITATIONS: ICD-10-CM

## 2021-04-16 PROCEDURE — 93306 TTE W/DOPPLER COMPLETE: CPT

## 2021-04-16 PROCEDURE — 93306 TTE W/DOPPLER COMPLETE: CPT | Performed by: INTERNAL MEDICINE

## 2021-04-19 ENCOUNTER — TELEPHONE (OUTPATIENT)
Dept: CARDIOLOGY | Facility: CLINIC | Age: 41
End: 2021-04-19

## 2021-04-19 LAB
BH CV ECHO MEAS - AO MAX PG (FULL): 5.2 MMHG
BH CV ECHO MEAS - AO MAX PG: 11 MMHG
BH CV ECHO MEAS - AO MEAN PG (FULL): 2 MMHG
BH CV ECHO MEAS - AO MEAN PG: 5 MMHG
BH CV ECHO MEAS - AO ROOT AREA (BSA CORRECTED): 1.6
BH CV ECHO MEAS - AO ROOT AREA: 6.2 CM^2
BH CV ECHO MEAS - AO ROOT DIAM: 2.8 CM
BH CV ECHO MEAS - AO V2 MAX: 163 CM/SEC
BH CV ECHO MEAS - AO V2 MEAN: 107 CM/SEC
BH CV ECHO MEAS - AO V2 VTI: 35.6 CM
BH CV ECHO MEAS - ASC AORTA: 2.8 CM
BH CV ECHO MEAS - AVA(I,A): 2 CM^2
BH CV ECHO MEAS - AVA(I,D): 2 CM^2
BH CV ECHO MEAS - AVA(V,A): 2.3 CM^2
BH CV ECHO MEAS - AVA(V,D): 2.3 CM^2
BH CV ECHO MEAS - BSA(HAYCOCK): 1.8 M^2
BH CV ECHO MEAS - BSA: 1.8 M^2
BH CV ECHO MEAS - BZI_BMI: 25.3 KILOGRAMS/M^2
BH CV ECHO MEAS - BZI_METRIC_HEIGHT: 167.6 CM
BH CV ECHO MEAS - BZI_METRIC_WEIGHT: 71.2 KG
BH CV ECHO MEAS - EDV(CUBED): 110.6 ML
BH CV ECHO MEAS - EDV(MOD-SP2): 118 ML
BH CV ECHO MEAS - EDV(MOD-SP4): 100 ML
BH CV ECHO MEAS - EDV(TEICH): 107.5 ML
BH CV ECHO MEAS - EF(CUBED): 69 %
BH CV ECHO MEAS - EF(MOD-BP): 66 %
BH CV ECHO MEAS - EF(MOD-SP2): 70.3 %
BH CV ECHO MEAS - EF(MOD-SP4): 64 %
BH CV ECHO MEAS - EF(TEICH): 60.4 %
BH CV ECHO MEAS - ESV(CUBED): 34.3 ML
BH CV ECHO MEAS - ESV(MOD-SP2): 35 ML
BH CV ECHO MEAS - ESV(MOD-SP4): 36 ML
BH CV ECHO MEAS - ESV(TEICH): 42.5 ML
BH CV ECHO MEAS - FS: 32.3 %
BH CV ECHO MEAS - IVS/LVPW: 0.86
BH CV ECHO MEAS - IVSD: 0.76 CM
BH CV ECHO MEAS - LA DIMENSION: 2.9 CM
BH CV ECHO MEAS - LA/AO: 1
BH CV ECHO MEAS - LAD MAJOR: 4.3 CM
BH CV ECHO MEAS - LAT PEAK E' VEL: 15.1 CM/SEC
BH CV ECHO MEAS - LATERAL E/E' RATIO: 6.9
BH CV ECHO MEAS - LV DIASTOLIC VOL/BSA (35-75): 55.4 ML/M^2
BH CV ECHO MEAS - LV MASS(C)D: 131.4 GRAMS
BH CV ECHO MEAS - LV MASS(C)DI: 72.8 GRAMS/M^2
BH CV ECHO MEAS - LV MAX PG: 5.8 MMHG
BH CV ECHO MEAS - LV MEAN PG: 3 MMHG
BH CV ECHO MEAS - LV SYSTOLIC VOL/BSA (12-30): 19.9 ML/M^2
BH CV ECHO MEAS - LV V1 MAX: 120 CM/SEC
BH CV ECHO MEAS - LV V1 MEAN: 77.4 CM/SEC
BH CV ECHO MEAS - LV V1 VTI: 23.1 CM
BH CV ECHO MEAS - LVIDD: 4.8 CM
BH CV ECHO MEAS - LVIDS: 3.3 CM
BH CV ECHO MEAS - LVLD AP2: 8.3 CM
BH CV ECHO MEAS - LVLD AP4: 8.3 CM
BH CV ECHO MEAS - LVLS AP2: 7.2 CM
BH CV ECHO MEAS - LVLS AP4: 6.6 CM
BH CV ECHO MEAS - LVOT AREA (M): 3.1 CM^2
BH CV ECHO MEAS - LVOT AREA: 3.1 CM^2
BH CV ECHO MEAS - LVOT DIAM: 2 CM
BH CV ECHO MEAS - LVPWD: 0.89 CM
BH CV ECHO MEAS - MED PEAK E' VEL: 14.8 CM/SEC
BH CV ECHO MEAS - MEDIAL E/E' RATIO: 7
BH CV ECHO MEAS - MV A MAX VEL: 41.5 CM/SEC
BH CV ECHO MEAS - MV E MAX VEL: 104 CM/SEC
BH CV ECHO MEAS - MV E/A: 2.5
BH CV ECHO MEAS - PA ACC SLOPE: 499 CM/SEC^2
BH CV ECHO MEAS - PA ACC TIME: 0.17 SEC
BH CV ECHO MEAS - PA MAX PG: 4.9 MMHG
BH CV ECHO MEAS - PA PR(ACCEL): 1.4 MMHG
BH CV ECHO MEAS - PA V2 MAX: 110.5 CM/SEC
BH CV ECHO MEAS - RVDD: 1.7 CM
BH CV ECHO MEAS - SI(AO): 121.5 ML/M^2
BH CV ECHO MEAS - SI(CUBED): 42.3 ML/M^2
BH CV ECHO MEAS - SI(LVOT): 40.2 ML/M^2
BH CV ECHO MEAS - SI(MOD-SP2): 46 ML/M^2
BH CV ECHO MEAS - SI(MOD-SP4): 35.5 ML/M^2
BH CV ECHO MEAS - SI(TEICH): 36 ML/M^2
BH CV ECHO MEAS - SV(AO): 219.2 ML
BH CV ECHO MEAS - SV(CUBED): 76.3 ML
BH CV ECHO MEAS - SV(LVOT): 72.6 ML
BH CV ECHO MEAS - SV(MOD-SP2): 83 ML
BH CV ECHO MEAS - SV(MOD-SP4): 64 ML
BH CV ECHO MEAS - SV(TEICH): 65 ML
BH CV ECHO MEASUREMENTS AVERAGE E/E' RATIO: 6.96
BH CV VAS BP RIGHT ARM: NORMAL MMHG
BH CV XLRA - RV BASE: 4 CM
BH CV XLRA - RV LENGTH: 6.8 CM
BH CV XLRA - RV MID: 3.4 CM
BH CV XLRA - TDI S': 14.6 CM/SEC
LEFT ATRIUM VOLUME INDEX: 19.4 ML/M^2
LEFT ATRIUM VOLUME: 35 ML

## 2021-04-19 NOTE — TELEPHONE ENCOUNTER
Called patient to let them know results and recommendations per NSK (see NSK note).    .Left voicemail

## 2021-04-21 ENCOUNTER — ROUTINE PRENATAL (OUTPATIENT)
Dept: OBSTETRICS AND GYNECOLOGY | Facility: CLINIC | Age: 41
End: 2021-04-21

## 2021-04-21 ENCOUNTER — PREP FOR SURGERY (OUTPATIENT)
Dept: OTHER | Facility: HOSPITAL | Age: 41
End: 2021-04-21

## 2021-04-21 VITALS — SYSTOLIC BLOOD PRESSURE: 122 MMHG | WEIGHT: 153.2 LBS | BODY MASS INDEX: 24.73 KG/M2 | DIASTOLIC BLOOD PRESSURE: 78 MMHG

## 2021-04-21 DIAGNOSIS — Z3A.39 39 WEEKS GESTATION OF PREGNANCY: Primary | ICD-10-CM

## 2021-04-21 DIAGNOSIS — Z3A.38 38 WEEKS GESTATION OF PREGNANCY: Primary | ICD-10-CM

## 2021-04-21 PROCEDURE — 0502F SUBSEQUENT PRENATAL CARE: CPT | Performed by: OBSTETRICS & GYNECOLOGY

## 2021-04-21 RX ORDER — PROMETHAZINE HYDROCHLORIDE 12.5 MG/1
12.5 SUPPOSITORY RECTAL EVERY 6 HOURS PRN
Status: CANCELLED | OUTPATIENT
Start: 2021-04-21

## 2021-04-21 RX ORDER — ONDANSETRON 4 MG/1
4 TABLET, FILM COATED ORAL EVERY 6 HOURS PRN
Status: CANCELLED | OUTPATIENT
Start: 2021-04-21

## 2021-04-21 RX ORDER — SODIUM CHLORIDE 0.9 % (FLUSH) 0.9 %
3 SYRINGE (ML) INJECTION EVERY 12 HOURS SCHEDULED
Status: CANCELLED | OUTPATIENT
Start: 2021-04-21

## 2021-04-21 RX ORDER — SODIUM CHLORIDE, SODIUM LACTATE, POTASSIUM CHLORIDE, CALCIUM CHLORIDE 600; 310; 30; 20 MG/100ML; MG/100ML; MG/100ML; MG/100ML
125 INJECTION, SOLUTION INTRAVENOUS CONTINUOUS
Status: CANCELLED | OUTPATIENT
Start: 2021-04-21

## 2021-04-21 RX ORDER — MISOPROSTOL 200 UG/1
800 TABLET ORAL AS NEEDED
Status: CANCELLED | OUTPATIENT
Start: 2021-04-21

## 2021-04-21 RX ORDER — ACETAMINOPHEN 325 MG/1
650 TABLET ORAL EVERY 4 HOURS PRN
Status: CANCELLED | OUTPATIENT
Start: 2021-04-21

## 2021-04-21 RX ORDER — TRISODIUM CITRATE DIHYDRATE AND CITRIC ACID MONOHYDRATE 500; 334 MG/5ML; MG/5ML
30 SOLUTION ORAL ONCE AS NEEDED
Status: CANCELLED | OUTPATIENT
Start: 2021-04-21

## 2021-04-21 RX ORDER — CARBOPROST TROMETHAMINE 250 UG/ML
250 INJECTION, SOLUTION INTRAMUSCULAR AS NEEDED
Status: CANCELLED | OUTPATIENT
Start: 2021-04-21

## 2021-04-21 RX ORDER — PENICILLIN G 3000000 [IU]/50ML
3 INJECTION, SOLUTION INTRAVENOUS EVERY 4 HOURS
Status: CANCELLED | OUTPATIENT
Start: 2021-04-21 | End: 2021-04-23

## 2021-04-21 RX ORDER — MAGNESIUM CARB/ALUMINUM HYDROX 105-160MG
30 TABLET,CHEWABLE ORAL ONCE
Status: CANCELLED | OUTPATIENT
Start: 2021-04-21 | End: 2021-04-21

## 2021-04-21 RX ORDER — ONDANSETRON 2 MG/ML
4 INJECTION INTRAMUSCULAR; INTRAVENOUS EVERY 6 HOURS PRN
Status: CANCELLED | OUTPATIENT
Start: 2021-04-21

## 2021-04-21 RX ORDER — BUTORPHANOL TARTRATE 1 MG/ML
1 INJECTION, SOLUTION INTRAMUSCULAR; INTRAVENOUS
Status: CANCELLED | OUTPATIENT
Start: 2021-04-21

## 2021-04-21 RX ORDER — SODIUM CHLORIDE 0.9 % (FLUSH) 0.9 %
1-10 SYRINGE (ML) INJECTION AS NEEDED
Status: CANCELLED | OUTPATIENT
Start: 2021-04-21

## 2021-04-21 RX ORDER — OXYTOCIN-SODIUM CHLORIDE 0.9% IV SOLN 30 UNIT/500ML 30-0.9/5 UT/ML-%
650 SOLUTION INTRAVENOUS ONCE
Status: CANCELLED | OUTPATIENT
Start: 2021-04-21 | End: 2021-04-21

## 2021-04-21 RX ORDER — OXYTOCIN-SODIUM CHLORIDE 0.9% IV SOLN 30 UNIT/500ML 30-0.9/5 UT/ML-%
2-20 SOLUTION INTRAVENOUS
Status: CANCELLED | OUTPATIENT
Start: 2021-04-21

## 2021-04-21 RX ORDER — HYDROCODONE BITARTRATE AND ACETAMINOPHEN 5; 325 MG/1; MG/1
1 TABLET ORAL EVERY 4 HOURS PRN
Status: CANCELLED | OUTPATIENT
Start: 2021-04-21 | End: 2021-05-01

## 2021-04-21 RX ORDER — IBUPROFEN 600 MG/1
600 TABLET ORAL EVERY 6 HOURS PRN
Status: CANCELLED | OUTPATIENT
Start: 2021-04-21

## 2021-04-21 RX ORDER — PROMETHAZINE HYDROCHLORIDE 12.5 MG/1
12.5 TABLET ORAL EVERY 6 HOURS PRN
Status: CANCELLED | OUTPATIENT
Start: 2021-04-21

## 2021-04-21 RX ORDER — OXYTOCIN-SODIUM CHLORIDE 0.9% IV SOLN 30 UNIT/500ML 30-0.9/5 UT/ML-%
85 SOLUTION INTRAVENOUS ONCE
Status: CANCELLED | OUTPATIENT
Start: 2021-04-21 | End: 2021-04-21

## 2021-04-21 RX ORDER — LIDOCAINE HYDROCHLORIDE 10 MG/ML
5 INJECTION, SOLUTION EPIDURAL; INFILTRATION; INTRACAUDAL; PERINEURAL AS NEEDED
Status: CANCELLED | OUTPATIENT
Start: 2021-04-21

## 2021-04-21 RX ORDER — METHYLERGONOVINE MALEATE 0.2 MG/ML
200 INJECTION INTRAVENOUS ONCE AS NEEDED
Status: CANCELLED | OUTPATIENT
Start: 2021-04-21

## 2021-04-21 RX ORDER — TERBUTALINE SULFATE 1 MG/ML
0.25 INJECTION, SOLUTION SUBCUTANEOUS AS NEEDED
Status: CANCELLED | OUTPATIENT
Start: 2021-04-21

## 2021-04-21 NOTE — PATIENT INSTRUCTIONS
Labor Induction    Labor induction is when steps are taken to cause a pregnant woman to begin the labor process. Most women go into labor on their own between 37 weeks and 42 weeks of pregnancy. When this does not happen or when there is a medical need for labor to begin, steps may be taken to induce labor. Labor induction causes a pregnant woman's uterus to contract. It also causes the cervix to soften (ripen), open (dilate), and thin out (efface). Usually, labor is not induced before 39 weeks of pregnancy unless there is a medical reason to do so. Your health care provider will determine if labor induction is needed.  Before inducing labor, your health care provider will consider a number of factors, including:  · Your medical condition and your baby's.  · How many weeks along you are in your pregnancy.  · How mature your baby's lungs are.  · The condition of your cervix.  · The position of your baby.  · The size of your birth canal.  What are some reasons for labor induction?  Labor may be induced if:  · Your health or your baby's health is at risk.  · Your pregnancy is overdue by 1 week or more.  · Your water breaks but labor does not start on its own.  · There is a low amount of amniotic fluid around your baby.  You may also choose (elect) to have labor induced at a certain time. Generally, elective labor induction is done no earlier than 39 weeks of pregnancy.  What methods are used for labor induction?  Methods used for labor induction include:  · Prostaglandin medicine. This medicine starts contractions and causes the cervix to dilate and ripen. It can be taken by mouth (orally) or by being inserted into the vagina (suppository).  · Inserting a small, thin tube (catheter) with a balloon into the vagina and then expanding the balloon with water to dilate the cervix.  · Stripping the membranes. In this method, your health care provider gently separates amniotic sac tissue from the cervix. This causes the  cervix to stretch, which in turn causes the release of a hormone called progesterone. The hormone causes the uterus to contract. This procedure is often done during an office visit, after which you will be sent home to wait for contractions to begin.  · Breaking the water. In this method, your health care provider uses a small instrument to make a small hole in the amniotic sac. This eventually causes the amniotic sac to break. Contractions should begin after a few hours.  · Medicine to trigger or strengthen contractions. This medicine is given through an IV that is inserted into a vein in your arm.  Except for membrane stripping, which can be done in a clinic, labor induction is done in the hospital so that you and your baby can be carefully monitored.  How long does it take for labor to be induced?  The length of time it takes to induce labor depends on how ready your body is for labor. Some inductions can take up to 2-3 days, while others may take less than a day. Induction may take longer if:  · You are induced early in your pregnancy.  · It is your first pregnancy.  · Your cervix is not ready.  What are some risks associated with labor induction?  Some risks associated with labor induction include:  · Changes in fetal heart rate, such as being too high, too low, or irregular (erratic).  · Failed induction.  · Infection in the mother or the baby.  · Increased risk of having a  delivery.  · Fetal death.  · Breaking off (abruption) of the placenta from the uterus (rare).  · Rupture of the uterus (very rare).  When induction is needed for medical reasons, the benefits of induction generally outweigh the risks.  What are some reasons for not inducing labor?  Labor induction should not be done if:  · Your baby does not tolerate contractions.  · You have had previous surgeries on your uterus, such as a myomectomy, removal of fibroids, or a vertical scar from a previous  delivery.  · Your placenta lies  very low in your uterus and blocks the opening of the cervix (placenta previa).  · Your baby is not in a head-down position.  · The umbilical cord drops down into the birth canal in front of the baby.  · There are unusual circumstances, such as the baby being very early (premature).  · You have had more than 2 previous  deliveries.  Summary  · Labor induction is when steps are taken to cause a pregnant woman to begin the labor process.  · Labor induction causes a pregnant woman's uterus to contract. It also causes the cervix to ripen, dilate, and efface.  · Labor is not induced before 39 weeks of pregnancy unless there is a medical reason to do so.  · When induction is needed for medical reasons, the benefits of induction generally outweigh the risks.  This information is not intended to replace advice given to you by your health care provider. Make sure you discuss any questions you have with your health care provider.  Document Revised: 2018 Document Reviewed: 2018  [employer] Patient Education 2021 [employer]

## 2021-04-21 NOTE — PROGRESS NOTES
OB FOLLOW UP    Jeanne Bauman is a 40 y.o.  38w2d patient being seen today for her obstetrical follow up visit. Patient reports backache, fatigue, headache, heartburn and occasional contractions.   She has induction of labor scheduled for Monday night and the process was explained. All questions answered today.     Her prenatal care is complicated by (and status) :    Patient Active Problem List   Diagnosis   • Lipoma of anterior chest wall   • Insomnia   • Anxiety   • Heart palpitations   • FH: factor V Leiden mutation   • Abnormal mammogram of left breast   • Pregnancy   • Multigravida of advanced maternal age in first trimester   • Multigravida of advanced maternal age in first trimester   • AMA (advanced maternal age) multigravida 35+       ROS -   Patient Reports : No Problems  Patient Denies: Loss of Fluid, Vaginal Spotting and Vision Changes  Fetal Movement : normal    /78   Wt 69.5 kg (153 lb 3.2 oz)   LMP 2020   BMI 24.73 kg/m²     Ultrasound Today: no    EXAM:  Vitals: See prenatal flowsheet   Abdomen: Gravid, nontender to palpation   Urine glucose/protein: See prenatal flowsheet   Pelvic: See prenatal flowsheet     GBS Status: Was done on 2021.  The results are positive.   Her Delivery Plan is: Desires IOL at 39wks. Scheduled for 2021 @ 5PM    Assessment    1. Pregnancy at 38w2d  2. Fetal status reassuring     Problem List Items Addressed This Visit        Gravid and     Pregnancy - Primary          Plan    1. Routine prenatal care.   2. Labor precautions and kick counts discussed.   3. Has induction of labor set up for Monday night.       Petey Mccarty MD  2021

## 2021-04-23 ENCOUNTER — TELEPHONE (OUTPATIENT)
Dept: CARDIOLOGY | Facility: CLINIC | Age: 41
End: 2021-04-23

## 2021-04-23 ENCOUNTER — APPOINTMENT (OUTPATIENT)
Dept: PREADMISSION TESTING | Facility: HOSPITAL | Age: 41
End: 2021-04-23

## 2021-04-23 LAB — SARS-COV-2 RNA NOSE QL NAA+PROBE: NOT DETECTED

## 2021-04-23 PROCEDURE — C9803 HOPD COVID-19 SPEC COLLECT: HCPCS

## 2021-04-23 PROCEDURE — U0004 COV-19 TEST NON-CDC HGH THRU: HCPCS

## 2021-04-23 NOTE — TELEPHONE ENCOUNTER
----- Message from Rehan Huber MD sent at 4/21/2021  4:51 PM EDT -----  Patient's Holter monitor was normal

## 2021-04-26 ENCOUNTER — HOSPITAL ENCOUNTER (OUTPATIENT)
Dept: LABOR AND DELIVERY | Facility: HOSPITAL | Age: 41
Discharge: HOME | End: 2021-04-26

## 2021-04-26 ENCOUNTER — HOSPITAL ENCOUNTER (INPATIENT)
Facility: HOSPITAL | Age: 41
LOS: 2 days | Discharge: HOME | End: 2021-04-28
Attending: OBSTETRICS & GYNECOLOGY | Admitting: OBSTETRICS & GYNECOLOGY

## 2021-04-26 DIAGNOSIS — Z3A.39 39 WEEKS GESTATION OF PREGNANCY: ICD-10-CM

## 2021-04-26 LAB
ABO GROUP BLD: NORMAL
BLD GP AB SCN SERPL QL: NEGATIVE
DEPRECATED RDW RBC AUTO: 41.1 FL (ref 37–54)
ERYTHROCYTE [DISTWIDTH] IN BLOOD BY AUTOMATED COUNT: 12.3 % (ref 12.3–15.4)
HCT VFR BLD AUTO: 36.4 % (ref 34–46.6)
HGB BLD-MCNC: 12.4 G/DL (ref 12–15.9)
MCH RBC QN AUTO: 31.2 PG (ref 26.6–33)
MCHC RBC AUTO-ENTMCNC: 34.1 G/DL (ref 31.5–35.7)
MCV RBC AUTO: 91.7 FL (ref 79–97)
PLATELET # BLD AUTO: 168 10*3/MM3 (ref 140–450)
PMV BLD AUTO: 10.5 FL (ref 6–12)
RBC # BLD AUTO: 3.97 10*6/MM3 (ref 3.77–5.28)
RH BLD: POSITIVE
T&S EXPIRATION DATE: NORMAL
WBC # BLD AUTO: 7.16 10*3/MM3 (ref 3.4–10.8)

## 2021-04-26 PROCEDURE — 86901 BLOOD TYPING SEROLOGIC RH(D): CPT | Performed by: OBSTETRICS & GYNECOLOGY

## 2021-04-26 PROCEDURE — 59025 FETAL NON-STRESS TEST: CPT

## 2021-04-26 PROCEDURE — 85027 COMPLETE CBC AUTOMATED: CPT | Performed by: OBSTETRICS & GYNECOLOGY

## 2021-04-26 PROCEDURE — 86850 RBC ANTIBODY SCREEN: CPT | Performed by: OBSTETRICS & GYNECOLOGY

## 2021-04-26 PROCEDURE — 86900 BLOOD TYPING SEROLOGIC ABO: CPT | Performed by: OBSTETRICS & GYNECOLOGY

## 2021-04-26 PROCEDURE — 25010000002 PENICILLIN G POTASSIUM PER 600000 UNITS: Performed by: OBSTETRICS & GYNECOLOGY

## 2021-04-26 RX ORDER — PROMETHAZINE HYDROCHLORIDE 12.5 MG/1
12.5 SUPPOSITORY RECTAL EVERY 6 HOURS PRN
Status: DISCONTINUED | OUTPATIENT
Start: 2021-04-26 | End: 2021-04-27 | Stop reason: HOSPADM

## 2021-04-26 RX ORDER — PROMETHAZINE HYDROCHLORIDE 12.5 MG/1
12.5 TABLET ORAL EVERY 6 HOURS PRN
Status: DISCONTINUED | OUTPATIENT
Start: 2021-04-26 | End: 2021-04-27 | Stop reason: HOSPADM

## 2021-04-26 RX ORDER — ACETAMINOPHEN 325 MG/1
650 TABLET ORAL EVERY 4 HOURS PRN
Status: DISCONTINUED | OUTPATIENT
Start: 2021-04-26 | End: 2021-04-27 | Stop reason: HOSPADM

## 2021-04-26 RX ORDER — MAGNESIUM CARB/ALUMINUM HYDROX 105-160MG
30 TABLET,CHEWABLE ORAL ONCE
Status: DISCONTINUED | OUTPATIENT
Start: 2021-04-26 | End: 2021-04-27 | Stop reason: HOSPADM

## 2021-04-26 RX ORDER — METHYLERGONOVINE MALEATE 0.2 MG/ML
200 INJECTION INTRAVENOUS ONCE AS NEEDED
Status: DISCONTINUED | OUTPATIENT
Start: 2021-04-26 | End: 2021-04-27 | Stop reason: HOSPADM

## 2021-04-26 RX ORDER — ONDANSETRON 4 MG/1
4 TABLET, FILM COATED ORAL EVERY 6 HOURS PRN
Status: DISCONTINUED | OUTPATIENT
Start: 2021-04-26 | End: 2021-04-27 | Stop reason: HOSPADM

## 2021-04-26 RX ORDER — CARBOPROST TROMETHAMINE 250 UG/ML
250 INJECTION, SOLUTION INTRAMUSCULAR AS NEEDED
Status: DISCONTINUED | OUTPATIENT
Start: 2021-04-26 | End: 2021-04-27 | Stop reason: HOSPADM

## 2021-04-26 RX ORDER — OXYTOCIN-SODIUM CHLORIDE 0.9% IV SOLN 30 UNIT/500ML 30-0.9/5 UT/ML-%
650 SOLUTION INTRAVENOUS ONCE
Status: DISCONTINUED | OUTPATIENT
Start: 2021-04-26 | End: 2021-04-27 | Stop reason: HOSPADM

## 2021-04-26 RX ORDER — SODIUM CHLORIDE 0.9 % (FLUSH) 0.9 %
1-10 SYRINGE (ML) INJECTION AS NEEDED
Status: DISCONTINUED | OUTPATIENT
Start: 2021-04-26 | End: 2021-04-27 | Stop reason: HOSPADM

## 2021-04-26 RX ORDER — CETIRIZINE HYDROCHLORIDE 10 MG/1
10 TABLET ORAL DAILY
COMMUNITY
End: 2021-04-28 | Stop reason: HOSPADM

## 2021-04-26 RX ORDER — OXYTOCIN-SODIUM CHLORIDE 0.9% IV SOLN 30 UNIT/500ML 30-0.9/5 UT/ML-%
85 SOLUTION INTRAVENOUS ONCE
Status: COMPLETED | OUTPATIENT
Start: 2021-04-26 | End: 2021-04-27

## 2021-04-26 RX ORDER — SODIUM CHLORIDE, SODIUM LACTATE, POTASSIUM CHLORIDE, CALCIUM CHLORIDE 600; 310; 30; 20 MG/100ML; MG/100ML; MG/100ML; MG/100ML
125 INJECTION, SOLUTION INTRAVENOUS CONTINUOUS
Status: DISCONTINUED | OUTPATIENT
Start: 2021-04-26 | End: 2021-04-28 | Stop reason: HOSPADM

## 2021-04-26 RX ORDER — MISOPROSTOL 200 UG/1
800 TABLET ORAL AS NEEDED
Status: DISCONTINUED | OUTPATIENT
Start: 2021-04-26 | End: 2021-04-27 | Stop reason: HOSPADM

## 2021-04-26 RX ORDER — LIDOCAINE HYDROCHLORIDE 10 MG/ML
5 INJECTION, SOLUTION EPIDURAL; INFILTRATION; INTRACAUDAL; PERINEURAL AS NEEDED
Status: DISCONTINUED | OUTPATIENT
Start: 2021-04-26 | End: 2021-04-27 | Stop reason: HOSPADM

## 2021-04-26 RX ORDER — SODIUM CHLORIDE 0.9 % (FLUSH) 0.9 %
3 SYRINGE (ML) INJECTION EVERY 12 HOURS SCHEDULED
Status: DISCONTINUED | OUTPATIENT
Start: 2021-04-26 | End: 2021-04-27 | Stop reason: HOSPADM

## 2021-04-26 RX ORDER — TERBUTALINE SULFATE 1 MG/ML
0.25 INJECTION, SOLUTION SUBCUTANEOUS AS NEEDED
Status: DISCONTINUED | OUTPATIENT
Start: 2021-04-26 | End: 2021-04-27 | Stop reason: HOSPADM

## 2021-04-26 RX ORDER — IBUPROFEN 600 MG/1
600 TABLET ORAL EVERY 6 HOURS PRN
Status: DISCONTINUED | OUTPATIENT
Start: 2021-04-26 | End: 2021-04-27 | Stop reason: HOSPADM

## 2021-04-26 RX ORDER — PENICILLIN G 3000000 [IU]/50ML
3 INJECTION, SOLUTION INTRAVENOUS EVERY 4 HOURS
Status: DISCONTINUED | OUTPATIENT
Start: 2021-04-26 | End: 2021-04-27 | Stop reason: HOSPADM

## 2021-04-26 RX ORDER — ONDANSETRON 2 MG/ML
4 INJECTION INTRAMUSCULAR; INTRAVENOUS EVERY 6 HOURS PRN
Status: DISCONTINUED | OUTPATIENT
Start: 2021-04-26 | End: 2021-04-27 | Stop reason: HOSPADM

## 2021-04-26 RX ORDER — TRISODIUM CITRATE DIHYDRATE AND CITRIC ACID MONOHYDRATE 500; 334 MG/5ML; MG/5ML
30 SOLUTION ORAL ONCE AS NEEDED
Status: DISCONTINUED | OUTPATIENT
Start: 2021-04-26 | End: 2021-04-27 | Stop reason: HOSPADM

## 2021-04-26 RX ORDER — OXYTOCIN-SODIUM CHLORIDE 0.9% IV SOLN 30 UNIT/500ML 30-0.9/5 UT/ML-%
2-20 SOLUTION INTRAVENOUS
Status: DISCONTINUED | OUTPATIENT
Start: 2021-04-26 | End: 2021-04-27 | Stop reason: HOSPADM

## 2021-04-26 RX ORDER — HYDROCODONE BITARTRATE AND ACETAMINOPHEN 5; 325 MG/1; MG/1
1 TABLET ORAL EVERY 4 HOURS PRN
Status: DISCONTINUED | OUTPATIENT
Start: 2021-04-26 | End: 2021-04-27 | Stop reason: HOSPADM

## 2021-04-26 RX ORDER — BUTORPHANOL TARTRATE 1 MG/ML
1 INJECTION, SOLUTION INTRAMUSCULAR; INTRAVENOUS
Status: DISCONTINUED | OUTPATIENT
Start: 2021-04-26 | End: 2021-04-27 | Stop reason: HOSPADM

## 2021-04-26 RX ADMIN — SODIUM CHLORIDE 5 MILLION UNITS: 900 INJECTION INTRAVENOUS at 18:23

## 2021-04-26 RX ADMIN — OXYTOCIN 2 MILLI-UNITS/MIN: 10 INJECTION INTRAVENOUS at 19:35

## 2021-04-26 RX ADMIN — SODIUM CHLORIDE, POTASSIUM CHLORIDE, SODIUM LACTATE AND CALCIUM CHLORIDE 125 ML/HR: 600; 310; 30; 20 INJECTION, SOLUTION INTRAVENOUS at 18:18

## 2021-04-26 RX ADMIN — PENICILLIN G 3 MILLION UNITS: 3000000 INJECTION, SOLUTION INTRAVENOUS at 22:39

## 2021-04-27 ENCOUNTER — ANESTHESIA EVENT (OUTPATIENT)
Dept: LABOR AND DELIVERY | Facility: HOSPITAL | Age: 41
End: 2021-04-27

## 2021-04-27 ENCOUNTER — ANESTHESIA (OUTPATIENT)
Dept: LABOR AND DELIVERY | Facility: HOSPITAL | Age: 41
End: 2021-04-27

## 2021-04-27 PROCEDURE — 59025 FETAL NON-STRESS TEST: CPT

## 2021-04-27 PROCEDURE — 59400 OBSTETRICAL CARE: CPT | Performed by: OBSTETRICS & GYNECOLOGY

## 2021-04-27 PROCEDURE — 25010000002 FENTANYL CITRATE (PF) 100 MCG/2ML SOLUTION: Performed by: ANESTHESIOLOGY

## 2021-04-27 PROCEDURE — 25010000002 ONDANSETRON PER 1 MG: Performed by: OBSTETRICS & GYNECOLOGY

## 2021-04-27 PROCEDURE — 25010000002 PENICILLIN G POTASSIUM PER 600000 UNITS: Performed by: OBSTETRICS & GYNECOLOGY

## 2021-04-27 PROCEDURE — 51703 INSERT BLADDER CATH COMPLEX: CPT

## 2021-04-27 PROCEDURE — C1755 CATHETER, INTRASPINAL: HCPCS | Performed by: ANESTHESIOLOGY

## 2021-04-27 PROCEDURE — 25010000002 ROPIVACAINE PER 1 MG: Performed by: ANESTHESIOLOGY

## 2021-04-27 PROCEDURE — C1755 CATHETER, INTRASPINAL: HCPCS

## 2021-04-27 PROCEDURE — 0KQM0ZZ REPAIR PERINEUM MUSCLE, OPEN APPROACH: ICD-10-PCS | Performed by: OBSTETRICS & GYNECOLOGY

## 2021-04-27 RX ORDER — PROMETHAZINE HYDROCHLORIDE 12.5 MG/1
12.5 SUPPOSITORY RECTAL ONCE AS NEEDED
Status: DISCONTINUED | OUTPATIENT
Start: 2021-04-27 | End: 2021-04-28 | Stop reason: HOSPADM

## 2021-04-27 RX ORDER — PRENATAL VIT/IRON FUM/FOLIC AC 27MG-0.8MG
1 TABLET ORAL DAILY
Status: DISCONTINUED | OUTPATIENT
Start: 2021-04-27 | End: 2021-04-28 | Stop reason: HOSPADM

## 2021-04-27 RX ORDER — EPHEDRINE SULFATE 5 MG/ML
10 INJECTION INTRAVENOUS
Status: DISCONTINUED | OUTPATIENT
Start: 2021-04-27 | End: 2021-04-27 | Stop reason: HOSPADM

## 2021-04-27 RX ORDER — FENTANYL CITRATE 50 UG/ML
INJECTION, SOLUTION INTRAMUSCULAR; INTRAVENOUS AS NEEDED
Status: DISCONTINUED | OUTPATIENT
Start: 2021-04-27 | End: 2021-04-27

## 2021-04-27 RX ORDER — CARBOPROST TROMETHAMINE 250 UG/ML
250 INJECTION, SOLUTION INTRAMUSCULAR ONCE
Status: DISCONTINUED | OUTPATIENT
Start: 2021-04-27 | End: 2021-04-28 | Stop reason: HOSPADM

## 2021-04-27 RX ORDER — DOCUSATE SODIUM 100 MG/1
100 CAPSULE, LIQUID FILLED ORAL 2 TIMES DAILY
Status: DISCONTINUED | OUTPATIENT
Start: 2021-04-27 | End: 2021-04-28 | Stop reason: HOSPADM

## 2021-04-27 RX ORDER — HYDROCORTISONE 25 MG/G
1 CREAM TOPICAL AS NEEDED
Status: DISCONTINUED | OUTPATIENT
Start: 2021-04-27 | End: 2021-04-28 | Stop reason: HOSPADM

## 2021-04-27 RX ORDER — FENTANYL CITRATE 50 UG/ML
INJECTION, SOLUTION INTRAMUSCULAR; INTRAVENOUS AS NEEDED
Status: DISCONTINUED | OUTPATIENT
Start: 2021-04-27 | End: 2021-04-27 | Stop reason: SURG

## 2021-04-27 RX ORDER — ROPIVACAINE HYDROCHLORIDE 2 MG/ML
15 INJECTION, SOLUTION EPIDURAL; INFILTRATION; PERINEURAL CONTINUOUS
Status: DISCONTINUED | OUTPATIENT
Start: 2021-04-27 | End: 2021-04-28 | Stop reason: HOSPADM

## 2021-04-27 RX ORDER — OXYCODONE HYDROCHLORIDE 5 MG/1
5 TABLET ORAL EVERY 4 HOURS PRN
Status: DISCONTINUED | OUTPATIENT
Start: 2021-04-27 | End: 2021-04-28 | Stop reason: HOSPADM

## 2021-04-27 RX ORDER — METOCLOPRAMIDE 10 MG/1
10 TABLET ORAL ONCE AS NEEDED
Status: DISCONTINUED | OUTPATIENT
Start: 2021-04-27 | End: 2021-04-28 | Stop reason: HOSPADM

## 2021-04-27 RX ORDER — BUPIVACAINE HYDROCHLORIDE 2.5 MG/ML
INJECTION, SOLUTION EPIDURAL; INFILTRATION; INTRACAUDAL AS NEEDED
Status: DISCONTINUED | OUTPATIENT
Start: 2021-04-27 | End: 2021-04-27 | Stop reason: SURG

## 2021-04-27 RX ORDER — PROMETHAZINE HYDROCHLORIDE 25 MG/1
25 TABLET ORAL ONCE AS NEEDED
Status: DISCONTINUED | OUTPATIENT
Start: 2021-04-27 | End: 2021-04-28 | Stop reason: HOSPADM

## 2021-04-27 RX ORDER — FAMOTIDINE 10 MG/ML
20 INJECTION, SOLUTION INTRAVENOUS ONCE AS NEEDED
Status: DISCONTINUED | OUTPATIENT
Start: 2021-04-27 | End: 2021-04-27 | Stop reason: HOSPADM

## 2021-04-27 RX ORDER — METHYLERGONOVINE MALEATE 0.2 MG/ML
200 INJECTION INTRAVENOUS ONCE AS NEEDED
Status: DISCONTINUED | OUTPATIENT
Start: 2021-04-27 | End: 2021-04-28 | Stop reason: HOSPADM

## 2021-04-27 RX ORDER — DIPHENHYDRAMINE HYDROCHLORIDE 50 MG/ML
12.5 INJECTION INTRAMUSCULAR; INTRAVENOUS EVERY 8 HOURS PRN
Status: DISCONTINUED | OUTPATIENT
Start: 2021-04-27 | End: 2021-04-27 | Stop reason: HOSPADM

## 2021-04-27 RX ORDER — BISACODYL 10 MG
10 SUPPOSITORY, RECTAL RECTAL DAILY PRN
Status: DISCONTINUED | OUTPATIENT
Start: 2021-04-28 | End: 2021-04-28 | Stop reason: HOSPADM

## 2021-04-27 RX ORDER — LANOLIN
CREAM (ML) TOPICAL
Status: DISCONTINUED | OUTPATIENT
Start: 2021-04-27 | End: 2021-04-28 | Stop reason: HOSPADM

## 2021-04-27 RX ORDER — SODIUM CHLORIDE 0.9 % (FLUSH) 0.9 %
1-10 SYRINGE (ML) INJECTION AS NEEDED
Status: DISCONTINUED | OUTPATIENT
Start: 2021-04-27 | End: 2021-04-28 | Stop reason: HOSPADM

## 2021-04-27 RX ORDER — MISOPROSTOL 200 UG/1
600 TABLET ORAL ONCE
Status: DISCONTINUED | OUTPATIENT
Start: 2021-04-27 | End: 2021-04-28 | Stop reason: HOSPADM

## 2021-04-27 RX ORDER — IBUPROFEN 600 MG/1
600 TABLET ORAL EVERY 6 HOURS
Status: DISCONTINUED | OUTPATIENT
Start: 2021-04-27 | End: 2021-04-28 | Stop reason: HOSPADM

## 2021-04-27 RX ORDER — ACETAMINOPHEN 325 MG/1
650 TABLET ORAL EVERY 4 HOURS
Status: DISCONTINUED | OUTPATIENT
Start: 2021-04-27 | End: 2021-04-28 | Stop reason: HOSPADM

## 2021-04-27 RX ORDER — TRISODIUM CITRATE DIHYDRATE AND CITRIC ACID MONOHYDRATE 500; 334 MG/5ML; MG/5ML
30 SOLUTION ORAL ONCE
Status: DISCONTINUED | OUTPATIENT
Start: 2021-04-27 | End: 2021-04-27 | Stop reason: HOSPADM

## 2021-04-27 RX ORDER — LIDOCAINE HYDROCHLORIDE AND EPINEPHRINE 15; 5 MG/ML; UG/ML
INJECTION, SOLUTION EPIDURAL AS NEEDED
Status: DISCONTINUED | OUTPATIENT
Start: 2021-04-27 | End: 2021-04-27 | Stop reason: SURG

## 2021-04-27 RX ADMIN — SODIUM CHLORIDE, POTASSIUM CHLORIDE, SODIUM LACTATE AND CALCIUM CHLORIDE 125 ML/HR: 600; 310; 30; 20 INJECTION, SOLUTION INTRAVENOUS at 08:54

## 2021-04-27 RX ADMIN — FENTANYL CITRATE 100 MCG: 50 INJECTION, SOLUTION INTRAMUSCULAR; INTRAVENOUS at 09:40

## 2021-04-27 RX ADMIN — SODIUM CHLORIDE, POTASSIUM CHLORIDE, SODIUM LACTATE AND CALCIUM CHLORIDE 125 ML/HR: 600; 310; 30; 20 INJECTION, SOLUTION INTRAVENOUS at 04:25

## 2021-04-27 RX ADMIN — PENICILLIN G 3 MILLION UNITS: 3000000 INJECTION, SOLUTION INTRAVENOUS at 02:59

## 2021-04-27 RX ADMIN — MISOPROSTOL 800 MCG: 200 TABLET ORAL at 14:20

## 2021-04-27 RX ADMIN — LIDOCAINE HYDROCHLORIDE AND EPINEPHRINE 2 ML: 15; 5 INJECTION, SOLUTION EPIDURAL at 09:24

## 2021-04-27 RX ADMIN — ACETAMINOPHEN 650 MG: 325 TABLET ORAL at 21:35

## 2021-04-27 RX ADMIN — BUPIVACAINE HYDROCHLORIDE 12 ML: 2.5 INJECTION, SOLUTION EPIDURAL; INFILTRATION; INTRACAUDAL; PERINEURAL at 09:25

## 2021-04-27 RX ADMIN — DOCUSATE SODIUM 100 MG: 100 CAPSULE, LIQUID FILLED ORAL at 21:35

## 2021-04-27 RX ADMIN — ROPIVACAINE HYDROCHLORIDE 15 ML/HR: 2 INJECTION, SOLUTION EPIDURAL; INFILTRATION at 09:41

## 2021-04-27 RX ADMIN — WITCH HAZEL 1 PAD: 500 SOLUTION RECTAL; TOPICAL at 17:06

## 2021-04-27 RX ADMIN — ONDANSETRON 4 MG: 2 INJECTION INTRAMUSCULAR; INTRAVENOUS at 12:53

## 2021-04-27 RX ADMIN — OXYTOCIN 85 ML/HR: 10 INJECTION INTRAVENOUS at 14:48

## 2021-04-27 RX ADMIN — PENICILLIN G 3 MILLION UNITS: 3000000 INJECTION, SOLUTION INTRAVENOUS at 09:00

## 2021-04-27 RX ADMIN — PENICILLIN G 3 MILLION UNITS: 3000000 INJECTION, SOLUTION INTRAVENOUS at 06:37

## 2021-04-27 RX ADMIN — EPHEDRINE SULFATE 10 MG: 5 INJECTION INTRAVENOUS at 10:23

## 2021-04-27 RX ADMIN — IBUPROFEN 600 MG: 600 TABLET ORAL at 23:56

## 2021-04-27 RX ADMIN — Medication: at 17:06

## 2021-04-27 RX ADMIN — LIDOCAINE HYDROCHLORIDE AND EPINEPHRINE 3 ML: 15; 5 INJECTION, SOLUTION EPIDURAL at 09:23

## 2021-04-27 RX ADMIN — SODIUM CHLORIDE, POTASSIUM CHLORIDE, SODIUM LACTATE AND CALCIUM CHLORIDE 1000 ML: 600; 310; 30; 20 INJECTION, SOLUTION INTRAVENOUS at 08:40

## 2021-04-27 RX ADMIN — HYDROCORTISONE 2.5% 1 APPLICATION: 25 CREAM TOPICAL at 17:06

## 2021-04-27 NOTE — ANESTHESIA PREPROCEDURE EVALUATION
Anesthesia Evaluation     Patient summary reviewed and Nursing notes reviewed   NPO Solid Status: > 6 hours  NPO Liquid Status: < 2 hours           Airway   Mallampati: I  TM distance: >3 FB  Neck ROM: full  No difficulty expected  Dental      Pulmonary - negative pulmonary ROS   Cardiovascular - negative cardio ROS        Neuro/Psych  (+) psychiatric history Anxiety,     GI/Hepatic/Renal/Endo - negative ROS     Musculoskeletal (-) negative ROS    Abdominal    Substance History - negative use     OB/GYN    (+) Pregnant,         Other                        Anesthesia Plan    ASA 2     epidural       Anesthetic plan, all risks, benefits, and alternatives have been provided, discussed and informed consent has been obtained with: patient.  Use of blood products discussed with patient .

## 2021-04-27 NOTE — ANESTHESIA PROCEDURE NOTES
Labor Epidural      Patient reassessed immediately prior to procedure    Patient location during procedure: OB  Performed By  Anesthesiologist: Steve Flores DO  Preanesthetic Checklist  Completed: patient identified, IV checked, site marked, risks and benefits discussed, surgical consent, monitors and equipment checked, pre-op evaluation and timeout performed  Prep:  Pt Position:sitting  Sterile Tech:gloves, mask, sterile barrier and cap  Prep:chlorhexidine gluconate and isopropyl alcohol  Monitoring:blood pressure monitoring and continuous pulse oximetry  Epidural Block Procedure:  Approach:midline  Guidance:landmark technique and palpation technique  Location:L3-L4  Needle Type:Tuohy  Needle Gauge:17 G  Loss of Resistance Medium: air  Loss of Resistance: 4cm  Cath Depth at skin:9 cm  Paresthesia: none  Aspiration:negative  Test Dose:negative  Number of Attempts: 1  Post Assessment:  Dressing:secured with tape and occlusive dressing applied (Tegaderm Placed)  Pt Tolerance:patient tolerated the procedure well with no apparent complications  Complications:no

## 2021-04-28 VITALS
TEMPERATURE: 98.3 F | SYSTOLIC BLOOD PRESSURE: 114 MMHG | BODY MASS INDEX: 24.91 KG/M2 | RESPIRATION RATE: 18 BRPM | WEIGHT: 155 LBS | HEART RATE: 59 BPM | DIASTOLIC BLOOD PRESSURE: 65 MMHG | OXYGEN SATURATION: 97 % | HEIGHT: 66 IN

## 2021-04-28 LAB
BASOPHILS # BLD AUTO: 0.03 10*3/MM3 (ref 0–0.2)
BASOPHILS NFR BLD AUTO: 0.3 % (ref 0–1.5)
DEPRECATED RDW RBC AUTO: 42.4 FL (ref 37–54)
EOSINOPHIL # BLD AUTO: 0.07 10*3/MM3 (ref 0–0.4)
EOSINOPHIL NFR BLD AUTO: 0.8 % (ref 0.3–6.2)
ERYTHROCYTE [DISTWIDTH] IN BLOOD BY AUTOMATED COUNT: 12.3 % (ref 12.3–15.4)
HCT VFR BLD AUTO: 32.6 % (ref 34–46.6)
HGB BLD-MCNC: 10.6 G/DL (ref 12–15.9)
IMM GRANULOCYTES # BLD AUTO: 0.07 10*3/MM3 (ref 0–0.05)
IMM GRANULOCYTES NFR BLD AUTO: 0.8 % (ref 0–0.5)
LYMPHOCYTES # BLD AUTO: 1.82 10*3/MM3 (ref 0.7–3.1)
LYMPHOCYTES NFR BLD AUTO: 20.4 % (ref 19.6–45.3)
MCH RBC QN AUTO: 30.9 PG (ref 26.6–33)
MCHC RBC AUTO-ENTMCNC: 32.5 G/DL (ref 31.5–35.7)
MCV RBC AUTO: 95 FL (ref 79–97)
MONOCYTES # BLD AUTO: 0.77 10*3/MM3 (ref 0.1–0.9)
MONOCYTES NFR BLD AUTO: 8.6 % (ref 5–12)
NEUTROPHILS NFR BLD AUTO: 6.16 10*3/MM3 (ref 1.7–7)
NEUTROPHILS NFR BLD AUTO: 69.1 % (ref 42.7–76)
NRBC BLD AUTO-RTO: 0 /100 WBC (ref 0–0.2)
PLATELET # BLD AUTO: 139 10*3/MM3 (ref 140–450)
PMV BLD AUTO: 10.8 FL (ref 6–12)
RBC # BLD AUTO: 3.43 10*6/MM3 (ref 3.77–5.28)
WBC # BLD AUTO: 8.92 10*3/MM3 (ref 3.4–10.8)

## 2021-04-28 PROCEDURE — 0503F POSTPARTUM CARE VISIT: CPT | Performed by: NURSE PRACTITIONER

## 2021-04-28 PROCEDURE — 85025 COMPLETE CBC W/AUTO DIFF WBC: CPT | Performed by: OBSTETRICS & GYNECOLOGY

## 2021-04-28 RX ORDER — IBUPROFEN 600 MG/1
600 TABLET ORAL EVERY 6 HOURS
Qty: 30 TABLET | Refills: 0 | OUTPATIENT
Start: 2021-04-28 | End: 2022-07-19

## 2021-04-28 RX ADMIN — IBUPROFEN 600 MG: 600 TABLET ORAL at 11:18

## 2021-04-28 RX ADMIN — ACETAMINOPHEN 650 MG: 325 TABLET ORAL at 02:21

## 2021-04-28 RX ADMIN — IBUPROFEN 600 MG: 600 TABLET ORAL at 05:54

## 2021-04-28 RX ADMIN — OXYCODONE 5 MG: 5 TABLET ORAL at 00:21

## 2021-04-28 RX ADMIN — ACETAMINOPHEN 650 MG: 325 TABLET ORAL at 05:54

## 2021-04-28 RX ADMIN — PRENATAL VITAMINS-IRON FUMARATE 27 MG IRON-FOLIC ACID 0.8 MG TABLET 1 TABLET: at 08:21

## 2021-04-28 RX ADMIN — DOCUSATE SODIUM 100 MG: 100 CAPSULE, LIQUID FILLED ORAL at 08:21

## 2021-04-28 NOTE — ANESTHESIA POSTPROCEDURE EVALUATION
Patient: Jeanne Bauman    Procedure Summary     Date: 04/27/21 Room / Location:     Anesthesia Start: 0912 Anesthesia Stop: 1418    Procedure: LABOR ANALGESIA Diagnosis:     Scheduled Providers:  Provider: Steve Flores DO    Anesthesia Type: epidural ASA Status: 2          Anesthesia Type: epidural    Vitals  Vitals Value Taken Time   /65 04/28/21 0800   Temp 98.3 °F (36.8 °C) 04/28/21 0800   Pulse 59 04/28/21 0800   Resp 18 04/28/21 0800   SpO2 97 % 04/27/21 0926           Post Anesthesia Care and Evaluation    Patient location during evaluation: bedside  Patient participation: complete - patient participated  Level of consciousness: awake and alert  Pain management: adequate  Airway patency: patent  Anesthetic complications: No anesthetic complications    Cardiovascular status: acceptable  Respiratory status: acceptable  Hydration status: acceptable  Post Neuraxial Block status: Motor and sensory function returned to baseline and No signs or symptoms of PDPH

## 2021-05-04 ENCOUNTER — TELEPHONE (OUTPATIENT)
Dept: OBSTETRICS AND GYNECOLOGY | Facility: CLINIC | Age: 41
End: 2021-05-04

## 2021-05-04 ENCOUNTER — POSTPARTUM VISIT (OUTPATIENT)
Dept: OBSTETRICS AND GYNECOLOGY | Facility: CLINIC | Age: 41
End: 2021-05-04

## 2021-05-04 VITALS
HEART RATE: 120 BPM | WEIGHT: 134.1 LBS | BODY MASS INDEX: 21.55 KG/M2 | OXYGEN SATURATION: 97 % | HEIGHT: 66 IN | TEMPERATURE: 99.4 F

## 2021-05-04 DIAGNOSIS — R82.90 ABNORMAL FINDING ON URINALYSIS: ICD-10-CM

## 2021-05-04 LAB
BILIRUB BLD-MCNC: NEGATIVE MG/DL
CLARITY, POC: CLEAR
COLOR UR: ABNORMAL
GLUCOSE UR STRIP-MCNC: NEGATIVE MG/DL
KETONES UR QL: NEGATIVE
LEUKOCYTE EST, POC: ABNORMAL
NITRITE UR-MCNC: NEGATIVE MG/ML
PH UR: 6.5 [PH] (ref 5–8)
PROT UR STRIP-MCNC: ABNORMAL MG/DL
RBC # UR STRIP: ABNORMAL /UL
SP GR UR: 1.02 (ref 1–1.03)
UROBILINOGEN UR QL: NORMAL

## 2021-05-04 PROCEDURE — 0503F POSTPARTUM CARE VISIT: CPT | Performed by: NURSE PRACTITIONER

## 2021-05-04 PROCEDURE — 99214 OFFICE O/P EST MOD 30 MIN: CPT | Performed by: NURSE PRACTITIONER

## 2021-05-04 RX ORDER — SULFAMETHOXAZOLE AND TRIMETHOPRIM 800; 160 MG/1; MG/1
1 TABLET ORAL 2 TIMES DAILY
Qty: 6 TABLET | Refills: 0 | OUTPATIENT
Start: 2021-05-04 | End: 2022-07-19

## 2021-05-04 RX ORDER — METRONIDAZOLE 500 MG/1
500 TABLET ORAL 2 TIMES DAILY
Qty: 14 TABLET | Refills: 0 | Status: SHIPPED | OUTPATIENT
Start: 2021-05-04 | End: 2021-05-11

## 2021-05-04 NOTE — TELEPHONE ENCOUNTER
21. D/c home 21. C/o temp this -decreased with meds-last dose at 0600. C/o increased bleeding, heavier flow with clotting. Denies soaking through pad <1hr but has not improved with rest and ibuprofen. Denies abnormal vaginal odor. C/o back pain-throbbing and pelvic discomfort.     Patient is BF, denies redness, streaking, warmth in breast. .     Patient to come in this AM for eval. CCUA on arrival. She VU.

## 2021-05-04 NOTE — PROGRESS NOTES
"1 week postpartum visit      Jeanne Bauman is a 40 y.o.  s/p Vaginal delivery at 39 weeks 1 day on 2021, who presents today for a 1 week postpartum check. Patient called with complaints of postpartum fever.    Per phone nurse: \"temp this  - decreased with meds [Tylenol] - last dose at 0600. C/o increased bleeding, heavier flow with clotting. Denies soaking through pad <1hr but has not improved with rest and ibuprofen. Denies abnormal vaginal odor. C/o back pain-throbbing and pelvic discomfort. Patient is [breastfeeding], denies redness, streaking, warmth in breast. .\"      Patient denies postpartum depression.  Patient describes bleeding as heavy. States bleeding was slowing down and dark brown, then turned bright red and picked up in flow (yesterday morning). Lower back throbbing constantly; began this AM. Changes pad every 2 hours. Patient is breast feeding. Denies bowel or bladder issues. Most recent tylenol dose at 0900.    Patient also reporting left nipple soreness; worsening and infant not latching well on that side. Denies s/s mastitis (other than fever).    PPD score 18 today; follow up needed.    PHYSICAL EXAM:    Pulse 120   Temp 99.4 °F (37.4 °C) (Oral)   Ht 167.6 cm (66\")   Wt 60.8 kg (134 lb 1.6 oz)   LMP 2020   SpO2 97%   BMI 21.64 kg/m²    General: NAD  Breasts: No erythema or warmth, engorged from recent milk coming in.  Abdomen: +BS, benign, no masses, soft, non-tender.  Incision: no  Bimanual exam: External genitalia appear normal.  Laceration healing well, no signs of infection, bleeding mild-moderate.  Uterus minimally tender.  No palpable masses in adnexa.   Extremities: No deep calf tenderness.  Baby Name: Ni    IMPRESSION/PLAN:  40 y.o.  s/p Vaginal delivery, 1 weeks postpartum with fever.  - No signs of mastitis  - CCUA small leuks, with complaints of back pain. Rx Bactrim, push water. Urine sent for culture  - Rx flagyl to cover for possible " endometritis  - PP depression- Rx Zoloft, reviewed side effects and risks, including risk of SI/HI. PVU. Recommend counseling.   - Pt to call for persistent or worsening symptoms.     Tisha Lyons, APRN  05/04/2021

## 2021-05-04 NOTE — TELEPHONE ENCOUNTER
1 week postpartum and noticed she was running a fever when she tapered off the ibuprofen yesterday. States fever is around 102 before taking anything. Complains of a headache but it could be related to the fever. Back and pelvic pain, bleeding slowed and was brown but it has picked up again and is bright red. States she is bleeding heavier than when she left the hospital. Isn't soaking a pad an hour though. Doesn't know what the issue could be.

## 2021-05-06 LAB
BACTERIA UR CULT: NORMAL
BACTERIA UR CULT: NORMAL

## 2021-06-23 ENCOUNTER — POSTPARTUM VISIT (OUTPATIENT)
Dept: OBSTETRICS AND GYNECOLOGY | Facility: CLINIC | Age: 41
End: 2021-06-23

## 2021-06-23 VITALS
WEIGHT: 133.2 LBS | HEIGHT: 66 IN | DIASTOLIC BLOOD PRESSURE: 56 MMHG | BODY MASS INDEX: 21.41 KG/M2 | SYSTOLIC BLOOD PRESSURE: 98 MMHG

## 2021-06-23 PROCEDURE — 0503F POSTPARTUM CARE VISIT: CPT | Performed by: OBSTETRICS & GYNECOLOGY

## 2021-06-23 NOTE — PROGRESS NOTES
"6 week postpartum visit      Jeanne Bauman is a 40 y.o.  s/p Vaginal delivery at 39.1 weeks on 2021, who presents today for a 6 week postpartum check.      Patient denies postpartum depression.  Patient describes bleeding as absent.  Patient is breast feeding.  Desires contraceptive methods: discuss options for contraception.  Denies bowel or bladder issues.    Discussed contraception options and she will consider.     She was started on zoloft at f/u visit prior but stopped and feeling better with no depression.     Review of Systems   Constitutional: Negative for activity change, appetite change, chills, diaphoresis, fatigue, fever and unexpected weight change.   Eyes: Negative for visual disturbance.   Respiratory: Negative for cough and shortness of breath.    Cardiovascular: Negative for chest pain and palpitations.   Gastrointestinal: Negative for abdominal distention, abdominal pain, diarrhea and vomiting.   Genitourinary: Negative for dysuria, menstrual problem and pelvic pain.   Musculoskeletal: Negative for back pain.   Skin: Negative for rash.   Neurological: Negative for light-headedness and headaches.   Psychiatric/Behavioral: Negative for agitation, behavioral problems, confusion, decreased concentration and dysphoric mood.       PHYSICAL EXAM:    BP 98/56   Ht 167.6 cm (65.98\")   Wt 60.4 kg (133 lb 3.2 oz)   LMP  (LMP Unknown)   Breastfeeding Yes   BMI 21.51 kg/m²   Abdomen: +BS, benign, no masses, soft, non-tender.  Incision: no  Bimanual exam: External genitalia appear normal.  Vagina pink, moist, rugated, laceration healed.  Uterus non-tender.  No palpable masses in adnexa.   Extremities: No deep calf tenderness.  Postpartum Depression Screening Questionnaire: completed last visit, stopped treatment since she feels better.      IMPRESSION/PLAN:  40 y.o.  s/p Vaginal delivery, 6 weeks postpartum.  Doing well.    - Recovered nicely from her delivery  - Return to normal " physical activity  - Contraception: contraceptive methods: None  - Resume annual gynecological examinations    Petey Mccarty MD  06/23/2021

## 2021-06-23 NOTE — PATIENT INSTRUCTIONS
Levonorgestrel intrauterine device (IUD)  What is this medicine?  LEVONORGESTREL IUD (MOHAN salinas) is a contraceptive (birth control) device. The device is placed inside the uterus by a healthcare professional. It is used to prevent pregnancy. This device can also be used to treat heavy bleeding that occurs during your period.  This medicine may be used for other purposes; ask your health care provider or pharmacist if you have questions.  COMMON BRAND NAME(S): Jaeena, LILETTA, Mirena, Julia  What should I tell my health care provider before I take this medicine?  They need to know if you have any of these conditions:  · abnormal Pap smear  · cancer of the breast, uterus, or cervix  · diabetes  · endometritis  · genital or pelvic infection now or in the past  · have more than one sexual partner or your partner has more than one partner  · heart disease  · history of an ectopic or tubal pregnancy  · immune system problems  · IUD in place  · liver disease or tumor  · problems with blood clots or take blood-thinners  · seizures  · use intravenous drugs  · uterus of unusual shape  · vaginal bleeding that has not been explained  · an unusual or allergic reaction to levonorgestrel, other hormones, silicone, or polyethylene, medicines, foods, dyes, or preservatives  · pregnant or trying to get pregnant  · breast-feeding  How should I use this medicine?  This device is placed inside the uterus by a health care professional.  Talk to your pediatrician regarding the use of this medicine in children. Special care may be needed.  Overdosage: If you think you have taken too much of this medicine contact a poison control center or emergency room at once.  NOTE: This medicine is only for you. Do not share this medicine with others.  What if I miss a dose?  This does not apply. Depending on the brand of device you have inserted, the device will need to be replaced every 3 to 6 years if you wish to continue using this type  of birth control.  What may interact with this medicine?  Do not take this medicine with any of the following medications:  · amprenavir  · bosentan  · fosamprenavir  This medicine may also interact with the following medications:  · aprepitant  · armodafinil  · barbiturate medicines for inducing sleep or treating seizures  · bexarotene  · boceprevir  · griseofulvin  · medicines to treat seizures like carbamazepine, ethotoin, felbamate, oxcarbazepine, phenytoin, topiramate  · modafinil  · pioglitazone  · rifabutin  · rifampin  · rifapentine  · some medicines to treat HIV infection like atazanavir, efavirenz, indinavir, lopinavir, nelfinavir, tipranavir, ritonavir  · Rogelio's wort  · warfarin  This list may not describe all possible interactions. Give your health care provider a list of all the medicines, herbs, non-prescription drugs, or dietary supplements you use. Also tell them if you smoke, drink alcohol, or use illegal drugs. Some items may interact with your medicine.  What should I watch for while using this medicine?  Visit your doctor or health care professional for regular check ups. See your doctor if you or your partner has sexual contact with others, becomes HIV positive, or gets a sexual transmitted disease.  This product does not protect you against HIV infection (AIDS) or other sexually transmitted diseases.  You can check the placement of the IUD yourself by reaching up to the top of your vagina with clean fingers to feel the threads. Do not pull on the threads. It is a good habit to check placement after each menstrual period. Call your doctor right away if you feel more of the IUD than just the threads or if you cannot feel the threads at all.  The IUD may come out by itself. You may become pregnant if the device comes out. If you notice that the IUD has come out use a backup birth control method like condoms and call your health care provider.  Using tampons will not change the position of the  IUD and are okay to use during your period.  This IUD can be safely scanned with magnetic resonance imaging (MRI) only under specific conditions. Before you have an MRI, tell your healthcare provider that you have an IUD in place, and which type of IUD you have in place.  What side effects may I notice from receiving this medicine?  Side effects that you should report to your doctor or health care professional as soon as possible:  · allergic reactions like skin rash, itching or hives, swelling of the face, lips, or tongue  · fever, flu-like symptoms  · genital sores  · high blood pressure  · no menstrual period for 6 weeks during use  · pain, swelling, warmth in the leg  · pelvic pain or tenderness  · severe or sudden headache  · signs of pregnancy  · stomach cramping  · sudden shortness of breath  · trouble with balance, talking, or walking  · unusual vaginal bleeding, discharge  · yellowing of the eyes or skin  Side effects that usually do not require medical attention (report to your doctor or health care professional if they continue or are bothersome):  · acne  · breast pain  · change in sex drive or performance  · changes in weight  · cramping, dizziness, or faintness while the device is being inserted  · headache  · irregular menstrual bleeding within first 3 to 6 months of use  · nausea  This list may not describe all possible side effects. Call your doctor for medical advice about side effects. You may report side effects to FDA at 7-312-FDA-5228.  Where should I keep my medicine?  This does not apply.  NOTE: This sheet is a summary. It may not cover all possible information. If you have questions about this medicine, talk to your doctor, pharmacist, or health care provider.  © 2021 [employer] (2019-10-29 13:22:01)

## 2021-07-18 NOTE — TELEPHONE ENCOUNTER
----- Message from Rehan Hbuer MD sent at 4/19/2021  8:55 AM EDT -----  Patient's echo result is overall reassuring.  Normal pumping function of her heart.  She has a mild amount of leak in her mitral valve which is not anything to be concerned about.   no

## 2022-07-18 ENCOUNTER — HOSPITAL ENCOUNTER (EMERGENCY)
Facility: HOSPITAL | Age: 42
Discharge: HOME | End: 2022-07-19
Attending: EMERGENCY MEDICINE | Admitting: EMERGENCY MEDICINE

## 2022-07-18 DIAGNOSIS — M79.662 PAIN OF LEFT CALF: Primary | ICD-10-CM

## 2022-07-18 DIAGNOSIS — Z83.2 FH: FACTOR V LEIDEN MUTATION: ICD-10-CM

## 2022-07-18 DIAGNOSIS — Z87.898 HISTORY OF PALPITATIONS: ICD-10-CM

## 2022-07-18 LAB
HOLD SPECIMEN: NORMAL
HOLD SPECIMEN: NORMAL
WHOLE BLOOD HOLD COAG: NORMAL
WHOLE BLOOD HOLD SPECIMEN: NORMAL

## 2022-07-18 PROCEDURE — 99283 EMERGENCY DEPT VISIT LOW MDM: CPT

## 2022-07-18 PROCEDURE — 85379 FIBRIN DEGRADATION QUANT: CPT | Performed by: PHYSICIAN ASSISTANT

## 2022-07-18 PROCEDURE — 36415 COLL VENOUS BLD VENIPUNCTURE: CPT

## 2022-07-18 PROCEDURE — 80053 COMPREHEN METABOLIC PANEL: CPT | Performed by: EMERGENCY MEDICINE

## 2022-07-18 PROCEDURE — 86140 C-REACTIVE PROTEIN: CPT | Performed by: EMERGENCY MEDICINE

## 2022-07-18 PROCEDURE — 85025 COMPLETE CBC W/AUTO DIFF WBC: CPT | Performed by: PHYSICIAN ASSISTANT

## 2022-07-19 ENCOUNTER — HOSPITAL ENCOUNTER (OUTPATIENT)
Dept: CARDIOLOGY | Facility: HOSPITAL | Age: 42
Discharge: HOME | End: 2022-07-19
Admitting: PHYSICIAN ASSISTANT

## 2022-07-19 VITALS
SYSTOLIC BLOOD PRESSURE: 114 MMHG | HEIGHT: 66 IN | HEART RATE: 70 BPM | BODY MASS INDEX: 20.09 KG/M2 | RESPIRATION RATE: 16 BRPM | DIASTOLIC BLOOD PRESSURE: 62 MMHG | OXYGEN SATURATION: 96 % | WEIGHT: 125 LBS | TEMPERATURE: 98.6 F

## 2022-07-19 VITALS — WEIGHT: 125 LBS | BODY MASS INDEX: 20.09 KG/M2 | HEIGHT: 66 IN

## 2022-07-19 DIAGNOSIS — Z83.2 FH: FACTOR V LEIDEN MUTATION: ICD-10-CM

## 2022-07-19 DIAGNOSIS — M79.662 PAIN OF LEFT CALF: ICD-10-CM

## 2022-07-19 LAB
ALBUMIN SERPL-MCNC: 4.4 G/DL (ref 3.5–5.2)
ALBUMIN/GLOB SERPL: 1.5 G/DL
ALP SERPL-CCNC: 92 U/L (ref 39–117)
ALT SERPL W P-5'-P-CCNC: 12 U/L (ref 1–33)
ANION GAP SERPL CALCULATED.3IONS-SCNC: 10 MMOL/L (ref 5–15)
AST SERPL-CCNC: 19 U/L (ref 1–32)
BASOPHILS # BLD AUTO: 0.04 10*3/MM3 (ref 0–0.2)
BASOPHILS NFR BLD AUTO: 0.7 % (ref 0–1.5)
BH CV LOWER VASCULAR LEFT COMMON FEMORAL AUGMENT: NORMAL
BH CV LOWER VASCULAR LEFT COMMON FEMORAL COMPETENT: NORMAL
BH CV LOWER VASCULAR LEFT COMMON FEMORAL COMPRESS: NORMAL
BH CV LOWER VASCULAR LEFT COMMON FEMORAL PHASIC: NORMAL
BH CV LOWER VASCULAR LEFT COMMON FEMORAL SPONT: NORMAL
BH CV LOWER VASCULAR LEFT DISTAL FEMORAL AUGMENT: NORMAL
BH CV LOWER VASCULAR LEFT DISTAL FEMORAL COMPETENT: NORMAL
BH CV LOWER VASCULAR LEFT DISTAL FEMORAL COMPRESS: NORMAL
BH CV LOWER VASCULAR LEFT DISTAL FEMORAL PHASIC: NORMAL
BH CV LOWER VASCULAR LEFT DISTAL FEMORAL SPONT: NORMAL
BH CV LOWER VASCULAR LEFT GASTRONEMIUS COMPRESS: NORMAL
BH CV LOWER VASCULAR LEFT GREATER SAPH AK COMPRESS: NORMAL
BH CV LOWER VASCULAR LEFT GREATER SAPH BK COMPRESS: NORMAL
BH CV LOWER VASCULAR LEFT LESSER SAPH COMPRESS: NORMAL
BH CV LOWER VASCULAR LEFT MID FEMORAL AUGMENT: NORMAL
BH CV LOWER VASCULAR LEFT MID FEMORAL COMPETENT: NORMAL
BH CV LOWER VASCULAR LEFT MID FEMORAL COMPRESS: NORMAL
BH CV LOWER VASCULAR LEFT MID FEMORAL PHASIC: NORMAL
BH CV LOWER VASCULAR LEFT MID FEMORAL SPONT: NORMAL
BH CV LOWER VASCULAR LEFT PERONEAL COMPRESS: NORMAL
BH CV LOWER VASCULAR LEFT POPLITEAL AUGMENT: NORMAL
BH CV LOWER VASCULAR LEFT POPLITEAL COMPETENT: NORMAL
BH CV LOWER VASCULAR LEFT POPLITEAL COMPRESS: NORMAL
BH CV LOWER VASCULAR LEFT POPLITEAL PHASIC: NORMAL
BH CV LOWER VASCULAR LEFT POPLITEAL SPONT: NORMAL
BH CV LOWER VASCULAR LEFT POSTERIOR TIBIAL COMPRESS: NORMAL
BH CV LOWER VASCULAR LEFT PROFUNDA FEMORAL AUGMENT: NORMAL
BH CV LOWER VASCULAR LEFT PROFUNDA FEMORAL COMPETENT: NORMAL
BH CV LOWER VASCULAR LEFT PROFUNDA FEMORAL COMPRESS: NORMAL
BH CV LOWER VASCULAR LEFT PROFUNDA FEMORAL PHASIC: NORMAL
BH CV LOWER VASCULAR LEFT PROFUNDA FEMORAL SPONT: NORMAL
BH CV LOWER VASCULAR LEFT PROXIMAL FEMORAL AUGMENT: NORMAL
BH CV LOWER VASCULAR LEFT PROXIMAL FEMORAL COMPETENT: NORMAL
BH CV LOWER VASCULAR LEFT PROXIMAL FEMORAL COMPRESS: NORMAL
BH CV LOWER VASCULAR LEFT PROXIMAL FEMORAL PHASIC: NORMAL
BH CV LOWER VASCULAR LEFT PROXIMAL FEMORAL SPONT: NORMAL
BH CV LOWER VASCULAR LEFT SAPHENOFEMORAL JUNCTION AUGMENT: NORMAL
BH CV LOWER VASCULAR LEFT SAPHENOFEMORAL JUNCTION COMPETENT: NORMAL
BH CV LOWER VASCULAR LEFT SAPHENOFEMORAL JUNCTION COMPRESS: NORMAL
BH CV LOWER VASCULAR LEFT SAPHENOFEMORAL JUNCTION PHASIC: NORMAL
BH CV LOWER VASCULAR LEFT SAPHENOFEMORAL JUNCTION SPONT: NORMAL
BH CV LOWER VASCULAR RIGHT COMMON FEMORAL AUGMENT: NORMAL
BH CV LOWER VASCULAR RIGHT COMMON FEMORAL COMPETENT: NORMAL
BH CV LOWER VASCULAR RIGHT COMMON FEMORAL COMPRESS: NORMAL
BH CV LOWER VASCULAR RIGHT COMMON FEMORAL PHASIC: NORMAL
BH CV LOWER VASCULAR RIGHT COMMON FEMORAL SPONT: NORMAL
BILIRUB SERPL-MCNC: 0.2 MG/DL (ref 0–1.2)
BUN SERPL-MCNC: 14 MG/DL (ref 6–20)
BUN/CREAT SERPL: 15.2 (ref 7–25)
CALCIUM SPEC-SCNC: 9.9 MG/DL (ref 8.6–10.5)
CHLORIDE SERPL-SCNC: 107 MMOL/L (ref 98–107)
CO2 SERPL-SCNC: 24 MMOL/L (ref 22–29)
CREAT SERPL-MCNC: 0.92 MG/DL (ref 0.57–1)
CRP SERPL-MCNC: <0.3 MG/DL (ref 0–0.5)
D DIMER PPP FEU-MCNC: 0.28 MCGFEU/ML (ref 0.01–0.5)
DEPRECATED RDW RBC AUTO: 40.3 FL (ref 37–54)
EGFRCR SERPLBLD CKD-EPI 2021: 81.4 ML/MIN/1.73
EOSINOPHIL # BLD AUTO: 0.11 10*3/MM3 (ref 0–0.4)
EOSINOPHIL NFR BLD AUTO: 1.8 % (ref 0.3–6.2)
ERYTHROCYTE [DISTWIDTH] IN BLOOD BY AUTOMATED COUNT: 12.6 % (ref 12.3–15.4)
GLOBULIN UR ELPH-MCNC: 2.9 GM/DL
GLUCOSE SERPL-MCNC: 111 MG/DL (ref 65–99)
HCT VFR BLD AUTO: 41.2 % (ref 34–46.6)
HGB BLD-MCNC: 14 G/DL (ref 12–15.9)
HOLD SPECIMEN: NORMAL
IMM GRANULOCYTES # BLD AUTO: 0.02 10*3/MM3 (ref 0–0.05)
IMM GRANULOCYTES NFR BLD AUTO: 0.3 % (ref 0–0.5)
LYMPHOCYTES # BLD AUTO: 2.56 10*3/MM3 (ref 0.7–3.1)
LYMPHOCYTES NFR BLD AUTO: 42.7 % (ref 19.6–45.3)
MAXIMAL PREDICTED HEART RATE: 181 BPM
MCH RBC QN AUTO: 29.9 PG (ref 26.6–33)
MCHC RBC AUTO-ENTMCNC: 34 G/DL (ref 31.5–35.7)
MCV RBC AUTO: 88 FL (ref 79–97)
MONOCYTES # BLD AUTO: 0.75 10*3/MM3 (ref 0.1–0.9)
MONOCYTES NFR BLD AUTO: 12.5 % (ref 5–12)
NEUTROPHILS NFR BLD AUTO: 2.52 10*3/MM3 (ref 1.7–7)
NEUTROPHILS NFR BLD AUTO: 42 % (ref 42.7–76)
NRBC BLD AUTO-RTO: 0 /100 WBC (ref 0–0.2)
PLATELET # BLD AUTO: 250 10*3/MM3 (ref 140–450)
PMV BLD AUTO: 10 FL (ref 6–12)
POTASSIUM SERPL-SCNC: 5.1 MMOL/L (ref 3.5–5.2)
PROT SERPL-MCNC: 7.3 G/DL (ref 6–8.5)
RBC # BLD AUTO: 4.68 10*6/MM3 (ref 3.77–5.28)
SODIUM SERPL-SCNC: 141 MMOL/L (ref 136–145)
STRESS TARGET HR: 154 BPM
WBC NRBC COR # BLD: 6 10*3/MM3 (ref 3.4–10.8)

## 2022-07-19 PROCEDURE — 93971 EXTREMITY STUDY: CPT | Performed by: INTERNAL MEDICINE

## 2022-07-19 PROCEDURE — 93971 EXTREMITY STUDY: CPT

## 2022-07-19 NOTE — DISCHARGE INSTRUCTIONS
ER evaluation revealed stable exam of the left calf.  There is no evidence of superficial varicose veins and no evidence of swelling, redness, or warmth.  Patient had a normal CBC, chemistries, and normal D-dimer.  With family history of factor V Leiden mutation, we will proceed with ultrasound of the left lower extremity in the morning in the outpatient setting.  Return to the ER if any worsening symptoms.

## 2022-07-19 NOTE — ED PROVIDER NOTES
"Subjective   This is a healthy 41-year-old female that presents the ER with left calf pain for the last 4 days.  Patient denies any precipitating injury or strain.  She says there is a localized area that hurts to touch, but there is no swelling, redness, or warmth.  Patient denies any varicose veins or history of DVT.  She has had a superficial thrombus in the right arm in the past after childbirth in the arm that she had multiple IVs in during labor and delivery.  She denies any known clotting disorder and was not started on any type of anticoagulation for the superficial thrombus.  She does however report a family history of factor V Leiden mutation.  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  She denies any recent surgery.  She is a non-smoker.  She denies taking OCPs or hormone replacement.  Patient denies any chest pain or shortness of breath.  She denies any fever or chills.  Patient says that she tried to \"walk it off\" to help with the left calf pain as well as taking ibuprofen.  She also reports some intermittent tingling to the left heel and left fifth toe.  She denies any back pain.  Past medical history is significant for palpitations, anxiety, and history of postpartum hemorrhage.  No other concerns at this time.      History provided by:  Patient  Leg Pain  Location:  Leg  Injury: no    Leg location:  L lower leg (Calf)  Pain details:     Quality:  Cramping (\"pinching\")    Duration:  4 days    Timing:  Constant  Chronicity:  New  Prior injury to area:  No  Relieved by:  Nothing  Worsened by:  Nothing  Ineffective treatments:  NSAIDs (Pt tried to \"walk it off\", Advil)  Associated symptoms: tingling (Tingling to left heel and left 5th toe.  No back pain.)    Associated symptoms: no back pain, no decreased ROM, no fatigue, no fever, no neck pain and no swelling    Risk factors comment:  History of " superficial thrombus to right arm after childbirth with the arm that she had several IVs in during delivery.  No h/o clotting disorder.  This did not require anticoagulation.      Review of Systems   Constitutional: Negative.  Negative for activity change, appetite change, chills, diaphoresis, fatigue and fever.   HENT: Negative.    Respiratory: Negative.  Negative for chest tightness and shortness of breath.    Cardiovascular: Negative.  Negative for chest pain and leg swelling.   Gastrointestinal: Negative.  Negative for abdominal pain, constipation, diarrhea, nausea and vomiting.   Genitourinary: Negative.  Negative for dysuria, flank pain, frequency and urgency.        LMP: 7/2/22. No OCPs or hormone replacement.   Musculoskeletal: Positive for myalgias (Left calf pain without precipitating injury or event.  Cramping and pinching pain. ). Negative for back pain, joint swelling and neck pain.   Skin: Negative.  Negative for color change and wound.        No erythema, warmth, or STS to left calf.   Neurological: Negative.  Negative for dizziness, syncope and weakness.   All other systems reviewed and are negative.      Past Medical History:   Diagnosis Date   • Ovarian cyst    • Palpitations     worsened with pregnancy, saw cardiology   • Postpartum hemorrhage 2009       No Known Allergies    Past Surgical History:   Procedure Laterality Date   • TUBAL ABDOMINAL LIGATION  2013    Reports reversed in 2018 and no birth control since then. Takes prenatal daily       Family History   Problem Relation Age of Onset   • Diabetes Mother    • Breast cancer Neg Hx    • Ovarian cancer Neg Hx        Social History     Socioeconomic History   • Marital status:    • Number of children: 3   • Highest education level: Bachelor's degree (e.g., BA, AB, BS)   Tobacco Use   • Smoking status: Former Smoker     Packs/day: 0.25     Types: Cigarettes   • Smokeless tobacco: Never Used   • Tobacco comment: smoked in her teens casual    Substance and Sexual Activity   • Alcohol use: Not Currently     Alcohol/week: 1.0 standard drink     Types: 1 Glasses of wine per week     Comment: occas but pregnant now so not drinking   • Drug use: Not Currently   • Sexual activity: Yes           Objective   Physical Exam  Vitals and nursing note reviewed.   Constitutional:       Appearance: Normal appearance.   HENT:      Head: Normocephalic and atraumatic.      Right Ear: Tympanic membrane normal.      Left Ear: Tympanic membrane normal.      Nose: Nose normal.      Mouth/Throat:      Mouth: Mucous membranes are moist.      Pharynx: Oropharynx is clear.   Eyes:      Extraocular Movements: Extraocular movements intact.      Conjunctiva/sclera: Conjunctivae normal.      Pupils: Pupils are equal, round, and reactive to light.   Cardiovascular:      Rate and Rhythm: Normal rate and regular rhythm.  No extrasystoles are present.     Pulses: Normal pulses.           Dorsalis pedis pulses are 2+ on the right side and 2+ on the left side.        Posterior tibial pulses are 2+ on the right side and 2+ on the left side.      Heart sounds: Normal heart sounds.      Comments: Regular rate and rhythm.  No tachycardia.  No pedal edema to lower extremities.  Strong bilateral DP and PT pulses and brisk capillary refill.  There is no evidence of varicose veins to the left lower extremity and no palpable cord.  No erythema or warmth.  Patient has increased pain to the left calf with dorsi flexion of the left foot.  Pulmonary:      Effort: Pulmonary effort is normal. No tachypnea.      Breath sounds: Normal breath sounds.      Comments: No tachypnea.  Lungs are clear to auscultation bilaterally.  Abdominal:      General: Bowel sounds are normal.      Palpations: Abdomen is soft.   Musculoskeletal:         General: Normal range of motion.      Cervical back: Normal range of motion and neck supple.      Right lower leg: No edema.      Left lower leg: No edema.      Comments: No  bony tenderness to left lower extremity.  Full range of motion.   Skin:     General: Skin is warm and dry.      Findings: No bruising, ecchymosis or erythema.      Comments: No soft tissue swelling to the left lower extremity, most notably the calf.  No bruising, varicose veins, or erythema/warmth.   Neurological:      General: No focal deficit present.      Mental Status: She is alert.         Procedures           ED Course  ED Course as of 07/19/22 0109   Tue Jul 19, 2022   0048 CBC and chemistries were within normal limits.  CRP is negative and D-dimer is normal at 0.28.  Vital signs and exam are stable.  Patient has no significant risk factors for DVT.  She is still quite anxious about the possibility of a blood clot with persistent left calf pain.  I will discuss with her setting up outpatient ultrasound of the left lower extremity for reassurance.  ER work-up and diagnostic studies are reassuring. [FC]   0048 Despite reassuring work-up, patient is still quite anxious and wants to proceed with outpatient ultrasound of the left lower extremity in the morning.  I will place an order for Doppler ultrasound for further assessment at patient's request. [FC]      ED Course User Index  [FC] Rosalina Gonzalez, LUIS            Recent Results (from the past 24 hour(s))   Green Top (Gel)    Collection Time: 07/18/22  9:44 PM   Result Value Ref Range    Extra Tube Hold for add-ons.    Lavender Top    Collection Time: 07/18/22  9:44 PM   Result Value Ref Range    Extra Tube hold for add-on    Gold Top - SST    Collection Time: 07/18/22  9:44 PM   Result Value Ref Range    Extra Tube Hold for add-ons.    Light Blue Top    Collection Time: 07/18/22  9:44 PM   Result Value Ref Range    Extra Tube Hold for add-ons.    D-dimer, Quantitative    Collection Time: 07/18/22  9:44 PM    Specimen: Blood   Result Value Ref Range    D-Dimer, Quantitative 0.28 0.01 - 0.50 MCGFEU/mL   CBC Auto Differential    Collection Time: 07/18/22  9:44  PM    Specimen: Blood   Result Value Ref Range    WBC 6.00 3.40 - 10.80 10*3/mm3    RBC 4.68 3.77 - 5.28 10*6/mm3    Hemoglobin 14.0 12.0 - 15.9 g/dL    Hematocrit 41.2 34.0 - 46.6 %    MCV 88.0 79.0 - 97.0 fL    MCH 29.9 26.6 - 33.0 pg    MCHC 34.0 31.5 - 35.7 g/dL    RDW 12.6 12.3 - 15.4 %    RDW-SD 40.3 37.0 - 54.0 fl    MPV 10.0 6.0 - 12.0 fL    Platelets 250 140 - 450 10*3/mm3    Neutrophil % 42.0 (L) 42.7 - 76.0 %    Lymphocyte % 42.7 19.6 - 45.3 %    Monocyte % 12.5 (H) 5.0 - 12.0 %    Eosinophil % 1.8 0.3 - 6.2 %    Basophil % 0.7 0.0 - 1.5 %    Immature Grans % 0.3 0.0 - 0.5 %    Neutrophils, Absolute 2.52 1.70 - 7.00 10*3/mm3    Lymphocytes, Absolute 2.56 0.70 - 3.10 10*3/mm3    Monocytes, Absolute 0.75 0.10 - 0.90 10*3/mm3    Eosinophils, Absolute 0.11 0.00 - 0.40 10*3/mm3    Basophils, Absolute 0.04 0.00 - 0.20 10*3/mm3    Immature Grans, Absolute 0.02 0.00 - 0.05 10*3/mm3    nRBC 0.0 0.0 - 0.2 /100 WBC   Comprehensive Metabolic Panel    Collection Time: 07/18/22  9:44 PM    Specimen: Blood   Result Value Ref Range    Glucose 111 (H) 65 - 99 mg/dL    BUN 14 6 - 20 mg/dL    Creatinine 0.92 0.57 - 1.00 mg/dL    Sodium 141 136 - 145 mmol/L    Potassium 5.1 3.5 - 5.2 mmol/L    Chloride 107 98 - 107 mmol/L    CO2 24.0 22.0 - 29.0 mmol/L    Calcium 9.9 8.6 - 10.5 mg/dL    Total Protein 7.3 6.0 - 8.5 g/dL    Albumin 4.40 3.50 - 5.20 g/dL    ALT (SGPT) 12 1 - 33 U/L    AST (SGOT) 19 1 - 32 U/L    Alkaline Phosphatase 92 39 - 117 U/L    Total Bilirubin 0.2 0.0 - 1.2 mg/dL    Globulin 2.9 gm/dL    A/G Ratio 1.5 g/dL    BUN/Creatinine Ratio 15.2 7.0 - 25.0    Anion Gap 10.0 5.0 - 15.0 mmol/L    eGFR 81.4 >60.0 mL/min/1.73   C-reactive Protein    Collection Time: 07/18/22  9:44 PM    Specimen: Blood   Result Value Ref Range    C-Reactive Protein <0.30 0.00 - 0.50 mg/dL     Note: In addition to lab results from this visit, the labs listed above may include labs taken at another facility or during a different  "encounter within the last 24 hours. Please correlate lab times with ED admission and discharge times for further clarification of the services performed during this visit.    No orders to display     Vitals:    07/18/22 2057 07/19/22 0038 07/19/22 0050   BP: 107/49 114/62    Pulse: 68  70   Resp: 16     Temp: 98.6 °F (37 °C)     SpO2: 98% 96%    Weight: 56.7 kg (125 lb)     Height: 167.6 cm (66\")       Medications - No data to display  ECG/EMG Results (last 24 hours)     ** No results found for the last 24 hours. **        No orders to display                                         MDM    Final diagnoses:   Pain of left calf   History of palpitations   FH: factor V Leiden mutation       ED Disposition  ED Disposition     ED Disposition   Discharge    Condition   Stable    Comment   --             [facility] Emergency Department  [address] [facility] [address] [phone]    If symptoms worsen         Medication List      Stop    ibuprofen 600 MG tablet  Commonly known as: ADVIL,MOTRIN     sulfamethoxazole-trimethoprim 800-160 MG per tablet  Commonly known as: Bactrim Rosalina Llanos PA-C  07/19/22 0109    "

## 2022-08-15 PROCEDURE — 87205 SMEAR GRAM STAIN: CPT | Performed by: PHYSICIAN ASSISTANT

## 2022-08-15 PROCEDURE — 87070 CULTURE OTHR SPECIMN AEROBIC: CPT | Performed by: PHYSICIAN ASSISTANT

## 2022-12-08 ENCOUNTER — TELEPHONE (OUTPATIENT)
Dept: OBSTETRICS AND GYNECOLOGY | Facility: CLINIC | Age: 42
End: 2022-12-08

## 2022-12-08 ENCOUNTER — LAB (OUTPATIENT)
Dept: OBSTETRICS AND GYNECOLOGY | Facility: CLINIC | Age: 42
End: 2022-12-08

## 2022-12-08 DIAGNOSIS — O09.521 MULTIGRAVIDA OF ADVANCED MATERNAL AGE IN FIRST TRIMESTER: Primary | ICD-10-CM

## 2022-12-08 DIAGNOSIS — Z87.59 HISTORY OF MISCARRIAGE: ICD-10-CM

## 2022-12-08 NOTE — TELEPHONE ENCOUNTER
Per PT appt request:    Positive pregnancy test. Advanced maternal age, tubal reversal patient. Need to confirm that pregnancy is in correct spot and have levels checked for progesterone    Does pt need to come in for labs?

## 2022-12-09 LAB
HCG INTACT+B SERPL-ACNC: 31.74 MIU/ML
PROGEST SERPL-MCNC: 30.9 NG/ML

## 2022-12-09 NOTE — PROGRESS NOTES
Her progesterone is normal but hcg is only 31.  May be due to very early pregnancy. Recommend repeating next week.

## 2022-12-12 ENCOUNTER — LAB (OUTPATIENT)
Dept: OBSTETRICS AND GYNECOLOGY | Facility: CLINIC | Age: 42
End: 2022-12-12

## 2022-12-13 ENCOUNTER — TELEPHONE (OUTPATIENT)
Dept: OBSTETRICS AND GYNECOLOGY | Facility: CLINIC | Age: 42
End: 2022-12-13

## 2022-12-13 DIAGNOSIS — Z87.59 HISTORY OF MISCARRIAGE: Primary | ICD-10-CM

## 2022-12-13 NOTE — TELEPHONE ENCOUNTER
Spoke with patient and advised that she come in tomorrow for another HCG draw. Her numbers are rising but it hasn't doubled each day. Want to continue to follow number up.

## 2022-12-19 ENCOUNTER — LAB (OUTPATIENT)
Dept: OBSTETRICS AND GYNECOLOGY | Facility: CLINIC | Age: 42
End: 2022-12-19

## 2022-12-20 LAB — HCG INTACT+B SERPL-ACNC: NORMAL MIU/ML

## 2022-12-21 ENCOUNTER — TELEPHONE (OUTPATIENT)
Dept: OBSTETRICS AND GYNECOLOGY | Facility: CLINIC | Age: 42
End: 2022-12-21

## 2022-12-21 ENCOUNTER — OFFICE VISIT (OUTPATIENT)
Dept: OBSTETRICS AND GYNECOLOGY | Facility: CLINIC | Age: 42
End: 2022-12-21

## 2022-12-21 VITALS — WEIGHT: 131 LBS | SYSTOLIC BLOOD PRESSURE: 100 MMHG | BODY MASS INDEX: 21.14 KG/M2 | DIASTOLIC BLOOD PRESSURE: 62 MMHG

## 2022-12-21 DIAGNOSIS — Z3A.01 LESS THAN 8 WEEKS GESTATION OF PREGNANCY: ICD-10-CM

## 2022-12-21 DIAGNOSIS — R10.2 PELVIC PAIN: Primary | ICD-10-CM

## 2022-12-21 DIAGNOSIS — R10.32 LLQ PAIN: Primary | ICD-10-CM

## 2022-12-21 DIAGNOSIS — Z34.90 PREGNANCY, UNSPECIFIED GESTATIONAL AGE: ICD-10-CM

## 2022-12-21 DIAGNOSIS — Z87.440 HISTORY OF RECURRENT UTIS: ICD-10-CM

## 2022-12-21 PROCEDURE — 99213 OFFICE O/P EST LOW 20 MIN: CPT | Performed by: NURSE PRACTITIONER

## 2022-12-21 RX ORDER — CEPHALEXIN 500 MG/1
500 CAPSULE ORAL 4 TIMES DAILY
Qty: 28 CAPSULE | Refills: 0 | Status: SHIPPED | OUTPATIENT
Start: 2022-12-21 | End: 2022-12-28

## 2022-12-21 NOTE — PROGRESS NOTES
Chief Complaint   Patient presents with   • Threatened Miscarriage          HPI  Hope Luis Eduardo Bauman is a 42 y.o. female, , who presents with LLQ pain x 2 days.  Patient describes pain as a constant ache with intermittent pinching pains. Patient rates aching pain a 5-6/10 and pinching pain a 8-9/10. Patient denies vaginal bleeding, bowel or bladder issues. She has a history of frequent UTI's with her last pregnancy. She took prophylactic antibiotics for several months with her last pregnancy.    Recent Tests:  She had a quantitative hCG pregnancy test that was done - . and - 125, -  US today: Yes.  Findings showed GS,YS measuring 5w3d, CL on right, left ovary normal.  I have personally evaluated the U/S and agree with the findings. Anila Collazo, NETTE  She has not had prenatal care.  She complains of cramping and and pinching pain.  The pain is located in her left-lower quadrant. Pain radiates throughout the entirety of her leg and into her ankle.  Her past medical history is notable for spontaneous  x2 and tubal reversal.  She does not have passage of tissue.  Rh Status: Positive  She reports no additional symptoms or complaints.    The additional following portions of the patient's history were reviewed and updated as appropriate: allergies, current medications, past family history, past medical history, past social history, past surgical history and problem list.    Review of Systems   Constitutional: Negative.    Respiratory: Negative.    Cardiovascular: Negative.    Gastrointestinal: Negative for constipation.   Genitourinary: Positive for pelvic pain. Negative for dysuria.     All other systems reviewed and are negative.     I have reviewed and agree with the HPI, ROS, and historical information as entered above. Anila Collazo, APRN    Objective   /62   Wt 59.4 kg (131 lb)   LMP 2022 (Exact Date)   Breastfeeding No   BMI 21.14 kg/m²     Physical  Exam  Constitutional:       Appearance: Normal appearance.   Pulmonary:      Effort: Pulmonary effort is normal.   Neurological:      Mental Status: She is alert.            Assessment and Plan    Problem List Items Addressed This Visit        Gravid and     Pregnancy    Relevant Orders    US Ob Transvaginal   Other Visit Diagnoses     Pelvic pain    -  Primary    Relevant Orders    Urine Culture - Urine, Urine, Clean Catch    History of recurrent UTIs            CCUA- trace blood only, urine culture sent. Due to hx frequent UTI's will start treatment with keflex.   U/S reveals GS,YS measuring 5w3d, right CL cyst. Left ovary normal.    1. Threatened AB  2. Repeat U/S in 2 week(s).  3. Call for an increase in bleeding, abdominal pain, or fever.  Return in about 2 weeks (around 2023) for NOB CBF.    Counseling was given to patient for the following topics: diagnostic results and impressions . Total time of the encounter was 20 minutes and 10 minutes was spent counseling.      Anila Collazo, APRN  2022

## 2022-12-21 NOTE — TELEPHONE ENCOUNTER
Dr. Forester ABRAMS patient  LMP: 2022  HC/08: 31.74  : 125  MBT: B+    S/w patient- patient states that she had a tubal ligation reversal 3-3.5 years ago and is now pregnant. Patient reports LLQ pain that has been present for 2 days. Patient describes pain as a constant ache with intermittent pinching pains. Patient rates aching pain a 5-6/10 and pinching pain a 8-9/10. Patient denies vaginal bleeding, bowel or bladder issues.     Per TH- patient to come in today for TVUS to r/o ectopic pregnancy. Appointment scheduled with .

## 2022-12-21 NOTE — TELEPHONE ENCOUNTER
Pt calling for lab results and also wanted to know if it is normal that she is having cramping on her left side. She is concerned because she has had a tubal reversal.

## 2022-12-24 LAB
BACTERIA UR CULT: NORMAL
BACTERIA UR CULT: NORMAL

## 2023-01-10 ENCOUNTER — INITIAL PRENATAL (OUTPATIENT)
Dept: OBSTETRICS AND GYNECOLOGY | Facility: CLINIC | Age: 43
End: 2023-01-10
Payer: COMMERCIAL

## 2023-01-10 VITALS — SYSTOLIC BLOOD PRESSURE: 108 MMHG | BODY MASS INDEX: 20.47 KG/M2 | DIASTOLIC BLOOD PRESSURE: 70 MMHG | WEIGHT: 126.8 LBS

## 2023-01-10 DIAGNOSIS — O09.523 MULTIGRAVIDA OF ADVANCED MATERNAL AGE IN THIRD TRIMESTER: ICD-10-CM

## 2023-01-10 DIAGNOSIS — Z34.81 ENCOUNTER FOR SUPERVISION OF OTHER NORMAL PREGNANCY, FIRST TRIMESTER: Primary | ICD-10-CM

## 2023-01-10 PROCEDURE — 0501F PRENATAL FLOW SHEET: CPT | Performed by: OBSTETRICS & GYNECOLOGY

## 2023-01-10 RX ORDER — ERGOCALCIFEROL (VITAMIN D2) 10 MCG
400 TABLET ORAL DAILY
COMMUNITY

## 2023-01-10 RX ORDER — PROGESTERONE 100 MG/1
100 CAPSULE ORAL DAILY
Qty: 30 CAPSULE | Refills: 0 | Status: SHIPPED | OUTPATIENT
Start: 2023-01-10 | End: 2023-04-04

## 2023-01-10 NOTE — PATIENT INSTRUCTIONS
Prenatal Care  Prenatal care is health care during pregnancy. It helps you and your unborn baby (fetus) stay as healthy as possible. Prenatal care may be provided by a midwife, a family practice doctor, a mid-level practitioner (nurse practitioner or physician assistant), or a childbirth and pregnancy doctor (obstetrician).  How does this affect me?  During pregnancy, you will be closely monitored for any new conditions that might develop. To lower your risk of pregnancy complications, you and your health care provider will talk about any underlying conditions you have.  How does this affect my baby?  Early and consistent prenatal care increases the chance that your baby will be healthy during pregnancy. Prenatal care lowers the risk that your baby will be:  Born early (prematurely).  Smaller than expected at birth (small for gestational age).  What can I expect at the first prenatal care visit?  Your first prenatal care visit will likely be the longest. You should schedule your first prenatal care visit as soon as you know that you are pregnant. Your first visit is a good time to talk about any questions or concerns you have about pregnancy.  Medical history  At your visit, you and your health care provider will talk about your medical history, including:  Any past pregnancies.  Your family's medical history.  Medical history of the baby's father.  Any long-term (chronic) health conditions you have and how you manage them.  Any surgeries or procedures you have had.  Any current over-the-counter or prescription medicines, herbs, or supplements that you are taking.  Other factors that could pose a risk to your baby, including:  Exposure to harmful chemicals or radiation at work or at home.  Any substance use, including tobacco, alcohol, and drug use.  Your home setting and your stress levels, including:  Exposure to abuse or violence.  Household financial strain.  Your daily health habits, including diet and  exercise.  Tests and screenings  Your health care provider will:  Measure your weight, height, and blood pressure.  Do a physical exam, including a pelvic and breast exam.  Perform blood tests and urine tests to check for:  Urinary tract infection.  Sexually transmitted infections (STIs).  Low iron levels in your blood (anemia).  Blood type and certain proteins on red blood cells (Rh antibodies).  Infections and immunity to viruses, such as hepatitis B and rubella.  HIV (human immunodeficiency virus).  Discuss your options for genetic screening.  Tips about staying healthy  Your health care provider will also give you information about how to keep yourself and your baby healthy, including:  Nutrition and taking vitamins.  Physical activity.  How to manage pregnancy symptoms such as nausea and vomiting (morning sickness).  Infections and substances that may be harmful to your baby and how to avoid them.  Food safety.  Dental care.  Working.  Travel.  Warning signs to watch for and when to call your health care provider.  How often will I have prenatal care visits?  After your first prenatal care visit, you will have regular visits throughout your pregnancy. The visit schedule is often as follows:  Up to week 28 of pregnancy: once every 4 weeks.  28-36 weeks: once every 2 weeks.  After 36 weeks: every week until delivery.  Some women may have visits more or less often depending on any underlying health conditions and the health of the baby.  Keep all follow-up and prenatal care visits. This is important.  What happens during routine prenatal care visits?  Your health care provider will:  Measure your weight and blood pressure.  Check for fetal heart sounds.  Measure the height of your uterus in your abdomen (fundal height). This may be measured starting around week 20 of pregnancy.  Check the position of your baby inside your uterus.  Ask questions about your diet, sleeping patterns, and whether you can feel the baby  move.  Review warning signs to watch for and signs of labor.  Ask about any pregnancy symptoms you are having and how you are dealing with them. Symptoms may include:  Headaches.  Nausea and vomiting.  Vaginal discharge.  Swelling.  Fatigue.  Constipation.  Changes in your vision.  Feeling persistently sad or anxious.  Any discomfort, including back or pelvic pain.  Bleeding or spotting.  Make a list of questions to ask your health care provider at your routine visits.  What tests might I have during prenatal care visits?  You may have blood, urine, and imaging tests throughout your pregnancy, such as:  Urine tests to check for glucose, protein, or signs of infection.  Glucose tests to check for a form of diabetes that can develop during pregnancy (gestational diabetes mellitus). This is usually done around week 24 of pregnancy.  Ultrasounds to check your baby's growth and development, to check for birth defects, and to check your baby's well-being. These can also help to decide when you should deliver your baby.  A test to check for group B strep (GBS) infection. This is usually done around week 36 of pregnancy.  Genetic testing. This may include blood, fluid, or tissue sampling, or imaging tests, such as an ultrasound. Some genetic tests are done during the first trimester and some are done during the second trimester.  What else can I expect during prenatal care visits?  Your health care provider may recommend getting certain vaccines during pregnancy. These may include:  A yearly flu shot (annual influenza vaccine). This is especially important if you will be pregnant during flu season.  Tdap (tetanus, diphtheria, pertussis) vaccine. Getting this vaccine during pregnancy can protect your baby from whooping cough (pertussis) after birth. This vaccine may be recommended between weeks 27 and 36 of pregnancy.  A COVID-19 vaccine.  Later in your pregnancy, your health care provider may give you information  about:  Childbirth and breastfeeding classes.  Choosing a health care provider for your baby.  Umbilical cord banking.  Breastfeeding.  Birth control after your baby is born.  The hospital labor and delivery unit and how to set up a tour.  Registering at the hospital before you go into labor.  Where to find more information  Office on Women's Health: womenshealth.gov  American Pregnancy Association: americanpregnancy.org  March of Dimes: marchofdimes.org  Summary  Prenatal care helps you and your baby stay as healthy as possible during pregnancy.  Your first prenatal care visit will most likely be the longest.  You will have visits and tests throughout your pregnancy to monitor your health and your baby's health.  Bring a list of questions to your visits to ask your health care provider.  Make sure to keep all follow-up and prenatal care visits.  This information is not intended to replace advice given to you by your health care provider. Make sure you discuss any questions you have with your health care provider.  Document Revised: 09/30/2021 Document Reviewed: 09/30/2021  [employer] Patient Education © 2022 [employer]

## 2023-01-10 NOTE — PROGRESS NOTES
Initial ob visit     CC- Here for care of pregnancy        Hope Luis Eduardo Bauman is a 42 y.o. female, , who presents for her first obstetrical visit.  Her last LMP was Patient's last menstrual period was 2022 (exact date).. Her BEBETO is 2023, by Last Menstrual Period. Current GA is 8w1d.     Initial positive test date : 22, UPT        Prior obstetric issues: none  Patient's past medical history is significant for: none.  Family history of genetic issues (includes FOB): none  Prior infections concerning in pregnancy (Rash, fever in last 2 weeks): No  Varicella Hx - history of chicken pox  Prior testing for Cystic Fibrosis Carrier or Sickle Cell Trait- none  Prepregnancy BMI - Body mass index is 20.47 kg/m².  History of STD: no  Hx of HSV for patient or partner: no  Ultrasound Today: yes    OB History    Para Term  AB Living   7 4 4   2 4   SAB IAB Ectopic Molar Multiple Live Births   2 0     0 4      # Outcome Date GA Lbr Roland/2nd Weight Sex Delivery Anes PTL Lv   7 Current            6 Term 21 39w1d 17:11 :29 3910 g (8 lb 9.9 oz) F Vag-Spont EPI N BEATRIZ   5 2020              Birth Comments: cytotec, chemical preg   4 2020 8w0d          3 Term 13    M Vaginal unsp   BEATRIZ   2 Term 05/15/    M Vaginal unsp   BEATRIZ   1 Term 09/10/09    F Vaginal unsp   BEATRIZ       Additional Pertinent History   Last Pap : 20 Result: negative HPV: negative     Last Completed Pap Smear          PAP SMEAR (Every 3 Years) Next due on 2020  Pap IG, HPV-hr              History of abnormal Pap smear: no  Family history of uterine, colon, breast, or ovarian cancer: no  Feelings of Anxiety or Depression: no  Tobacco Usage?: No   Alcohol/Drug Use?: NO  Over the age of 35 at delivery: yes  Desires Genetic Screening: Cell Free DNA  Flu Status: Already given in current flu season    PMH    Current Outpatient Medications:   •  Calcium 200 MG tablet, Take  by mouth., Disp: ,  Rfl:   •  Prenatal Vit-Fe Fumarate-FA (PRENATAL, CLASSIC, VITAMIN) 28-0.8 MG tablet tablet, Take 1 tablet by mouth Daily., Disp: , Rfl:   •  Vitamin D, Cholecalciferol, (CHOLECALCIFEROL) 10 MCG (400 UNIT) tablet, Take 400 Units by mouth Daily., Disp: , Rfl:   •  fluticasone (FLONASE) 50 MCG/ACT nasal spray, 2 sprays into the nostril(s) as directed by provider Daily., Disp: 16 g, Rfl: 0  •  Progesterone (Prometrium) 100 MG capsule, Take 1 capsule by mouth Daily., Disp: 30 capsule, Rfl: 0  •  sertraline (Zoloft) 50 MG tablet, Take 1 tablet by mouth Daily., Disp: 30 tablet, Rfl: 2     Past Medical History:   Diagnosis Date   • Ovarian cyst    • Palpitations     worsened with pregnancy, saw cardiology   • Postpartum hemorrhage 2009        Past Surgical History:   Procedure Laterality Date   • TUBAL ABDOMINAL LIGATION  2013    Reports reversed in 2018 and no birth control since then. Takes prenatal daily       Review of Systems   Review of Systems  Patient Reports: Nausea and Cramping  Patient Denies: Nausea and vomiting, Spotting and Heavy bleeding  All systems reviewed and otherwise normal.    I have reviewed and agree with the HPI, ROS, and historical information as entered above. Petey Mccarty MD    /70   Wt 57.5 kg (126 lb 12.8 oz)   LMP 11/14/2022 (Exact Date)   BMI 20.47 kg/m²     The additional following portions of the patient's history were reviewed and updated as appropriate: allergies, current medications, past family history, past medical history, past social history and past surgical history.    Physical Exam  General:  well developed; well nourished  no acute distress   Chest/Respiratory: No labored breathing, normal respiratory effort, normal appearance, no respiratory noises noted   Heart:  normal rate, regular rhythm,  no murmurs, rubs, or gallops   Thyroid: normal to inspection and palpation   Breasts:  Not performed.   Abdomen: soft, non-tender; no masses  no umbilical or inguinal  hernias are present  no hepato-splenomegaly   Pelvis: Not performed.        Assessment and Plan    Problem List Items Addressed This Visit        Gravid and     AMA (advanced maternal age) multigravida 35+   Other Visit Diagnoses     Encounter for supervision of other normal pregnancy, first trimester    -  Primary    Relevant Orders    Obstetric Panel    HIV-1 / O / 2 Ag / Antibody 4th Generation    Urine Culture - Urine, Urine, Clean Catch    Chlamydia trachomatis, Neisseria gonorrhoeae, PCR - Urine, Urethra    Urine Drug Screen - Urine, Clean Catch          1. Pregnancy at 8w1d  2. Reviewed routine prenatal care with the office and educational materials given  3. Lab(s) Ordered  4. Discussed options for genetic testing including first trimester nuchal translucency screen, genetic disease carrier testing, quadruple screen, and [place].  5. Patient is on Prenatal vitamins  6. discussed baby aspirin from 10-36 weeks for prevention of preeclampsia   Return in about 4 weeks (around 2023) for Routine prenatal care.      Petey Mccarty MD  01/10/2023

## 2023-01-13 LAB
ABO GROUP BLD: NORMAL
AMPHETAMINES UR QL SCN: NEGATIVE NG/ML
BACTERIA UR CULT: NORMAL
BACTERIA UR CULT: NORMAL
BARBITURATES UR QL SCN: NEGATIVE NG/ML
BASOPHILS # BLD AUTO: 0 X10E3/UL (ref 0–0.2)
BASOPHILS NFR BLD AUTO: 1 %
BENZODIAZ UR QL SCN: NEGATIVE NG/ML
BLD GP AB SCN SERPL QL: NEGATIVE
BZE UR QL SCN: NEGATIVE NG/ML
C TRACH RRNA SPEC QL NAA+PROBE: NEGATIVE
CANNABINOIDS UR QL SCN: NEGATIVE NG/ML
CREAT UR-MCNC: 80.8 MG/DL (ref 20–300)
EOSINOPHIL # BLD AUTO: 0.1 X10E3/UL (ref 0–0.4)
EOSINOPHIL NFR BLD AUTO: 1 %
ERYTHROCYTE [DISTWIDTH] IN BLOOD BY AUTOMATED COUNT: 12.4 % (ref 11.7–15.4)
HBV SURFACE AG SERPL QL IA: NEGATIVE
HCT VFR BLD AUTO: 39.4 % (ref 34–46.6)
HCV AB S/CO SERPL IA: <0.1 S/CO RATIO (ref 0–0.9)
HGB BLD-MCNC: 13.2 G/DL (ref 11.1–15.9)
HIV 1+2 AB+HIV1 P24 AG SERPL QL IA: NON REACTIVE
IMM GRANULOCYTES # BLD AUTO: 0 X10E3/UL (ref 0–0.1)
IMM GRANULOCYTES NFR BLD AUTO: 0 %
LABORATORY COMMENT REPORT: NORMAL
LYMPHOCYTES # BLD AUTO: 1.5 X10E3/UL (ref 0.7–3.1)
LYMPHOCYTES NFR BLD AUTO: 27 %
MCH RBC QN AUTO: 29.3 PG (ref 26.6–33)
MCHC RBC AUTO-ENTMCNC: 33.5 G/DL (ref 31.5–35.7)
MCV RBC AUTO: 88 FL (ref 79–97)
METHADONE UR QL SCN: NEGATIVE NG/ML
MONOCYTES # BLD AUTO: 0.5 X10E3/UL (ref 0.1–0.9)
MONOCYTES NFR BLD AUTO: 9 %
N GONORRHOEA RRNA SPEC QL NAA+PROBE: NEGATIVE
NEUTROPHILS # BLD AUTO: 3.5 X10E3/UL (ref 1.4–7)
NEUTROPHILS NFR BLD AUTO: 62 %
OPIATES UR QL SCN: NEGATIVE NG/ML
OXYCODONE+OXYMORPHONE UR QL SCN: NEGATIVE NG/ML
PCP UR QL: NEGATIVE NG/ML
PH UR: 6.3 [PH] (ref 4.5–8.9)
PLATELET # BLD AUTO: 245 X10E3/UL (ref 150–450)
PROPOXYPH UR QL SCN: NEGATIVE NG/ML
RBC # BLD AUTO: 4.5 X10E6/UL (ref 3.77–5.28)
RH BLD: POSITIVE
RPR SER QL: NON REACTIVE
RUBV IGG SERPL IA-ACNC: 1.45 INDEX
WBC # BLD AUTO: 5.7 X10E3/UL (ref 3.4–10.8)

## 2023-02-13 ENCOUNTER — ROUTINE PRENATAL (OUTPATIENT)
Dept: OBSTETRICS AND GYNECOLOGY | Facility: CLINIC | Age: 43
End: 2023-02-13
Payer: COMMERCIAL

## 2023-02-13 VITALS — BODY MASS INDEX: 21.18 KG/M2 | WEIGHT: 131.2 LBS | DIASTOLIC BLOOD PRESSURE: 60 MMHG | SYSTOLIC BLOOD PRESSURE: 100 MMHG

## 2023-02-13 DIAGNOSIS — O09.521 MULTIGRAVIDA OF ADVANCED MATERNAL AGE IN FIRST TRIMESTER: ICD-10-CM

## 2023-02-13 DIAGNOSIS — Z34.92 PRENATAL CARE IN SECOND TRIMESTER: Primary | ICD-10-CM

## 2023-02-13 LAB
EXPIRATION DATE: NORMAL
GLUCOSE UR STRIP-MCNC: NEGATIVE MG/DL
Lab: NORMAL
PROT UR STRIP-MCNC: NEGATIVE MG/DL

## 2023-02-13 PROCEDURE — 0502F SUBSEQUENT PRENATAL CARE: CPT

## 2023-02-13 NOTE — PROGRESS NOTES
OB FOLLOW UP  CC- Here for care of pregnancy        Jeanne Bauman is a 42 y.o.  13w0d patient being seen today for her obstetrical follow up visit. Patient reports no complaints.    NOB labs reviewed- normal.     Her prenatal care is complicated by (and status) : See below  Patient Active Problem List   Diagnosis   • Lipoma of anterior chest wall   • Insomnia   • Anxiety   • Heart palpitations   • FH: factor V Leiden mutation   • Abnormal mammogram of left breast   • Pregnancy   • Multigravida of advanced maternal age in first trimester   • Multigravida of advanced maternal age in first trimester   • AMA (advanced maternal age) multigravida 35+       Desires genetic testing?: Yes with Gender  Ultrasound Today: No    ROS -   Patient Reports : No Problems  Patient Denies: Vaginal Spotting and cramping  Fetal Movement : absent- too early  All other systems reviewed and are negative.     The additional following portions of the patient's history were reviewed and updated as appropriate: allergies, current medications, past family history, past medical history, past social history, past surgical history and problem list.    I have reviewed and agree with the HPI, ROS, and historical information as entered above. Antonia Davis, APRN    /60   Wt 59.5 kg (131 lb 3.2 oz)   LMP 2022 (Exact Date)   BMI 21.18 kg/m²         EXAM:     Prenatal Vitals  BP: 100/60  Weight: 59.5 kg (131 lb 3.2 oz)   Fetal Heart Rate: 155          Urine Glucose Read-only: Negative  Urine Protein Read-only: Negative       Assessment and Plan    Problem List Items Addressed This Visit        Gravid and     Multigravida of advanced maternal age in first trimester    Relevant Orders    [id number] PLUS Core+SCA+ESS - Blood,   Other Visit Diagnoses     Prenatal care in first trimester    -  Primary    Relevant Orders    POC Protein, Urine, Qualitative, Dipstick (Completed)    POC Glucose, Urine, Qualitative,  Dipstick (Completed)          1. Pregnancy at 13w0d  2. Labs reviewed from New OB Visit.  3. Counseled on genetic testing, carrier status and option for NT screen. Patient will consider AFP testing and let us know at next visit.   4. Activity and Exercise discussed.  5. Patient is on Prenatal vitamins  6. Discussed bASA for PIH prevention from 10 to 36wk  7. Return in about 4 weeks (around 3/13/2023) for GREYSON ballesteros/ ALICIA.    Antonia Davis, APRN  02/13/2023

## 2023-03-14 ENCOUNTER — ROUTINE PRENATAL (OUTPATIENT)
Dept: OBSTETRICS AND GYNECOLOGY | Facility: CLINIC | Age: 43
End: 2023-03-14
Payer: COMMERCIAL

## 2023-03-14 VITALS — BODY MASS INDEX: 21.5 KG/M2 | DIASTOLIC BLOOD PRESSURE: 56 MMHG | WEIGHT: 133.2 LBS | SYSTOLIC BLOOD PRESSURE: 98 MMHG

## 2023-03-14 DIAGNOSIS — Z34.92 PRENATAL CARE IN SECOND TRIMESTER: Primary | ICD-10-CM

## 2023-03-14 DIAGNOSIS — O09.522 MULTIGRAVIDA OF ADVANCED MATERNAL AGE IN SECOND TRIMESTER: ICD-10-CM

## 2023-03-14 LAB
GLUCOSE UR STRIP-MCNC: NEGATIVE MG/DL
PROT UR STRIP-MCNC: NEGATIVE MG/DL

## 2023-03-14 PROCEDURE — 0502F SUBSEQUENT PRENATAL CARE: CPT | Performed by: OBSTETRICS & GYNECOLOGY

## 2023-03-14 NOTE — PROGRESS NOTES
OB FOLLOW UP  CC- Here for care of pregnancy        Jeanne Bauman is a 42 y.o.  17w1d patient being seen today for her obstetrical follow up visit. Patient reports headache. That is relieved by coffee or water intake.    Her prenatal care is complicated by (and status) : AMA  Patient Active Problem List   Diagnosis   • Lipoma of anterior chest wall   • Insomnia   • Anxiety   • Heart palpitations   • FH: factor V Leiden mutation   • Abnormal mammogram of left breast   • Pregnancy   • Multigravida of advanced maternal age in first trimester   • Multigravida of advanced maternal age in first trimester   • AMA (advanced maternal age) multigravida 35+       Ultrasound Today: No    AFP: desires    ROS -   Patient Reports : headaches  Patient Denies: Loss of Fluid, Vaginal Spotting, Vision Changes, Nausea , Vomiting , Contractions and Epigastric pain  Fetal Movement : just started to feel some movement  All other systems reviewed and are negative.       The additional following portions of the patient's history were reviewed and updated as appropriate: allergies, current medications, past family history, past medical history, past social history, past surgical history and problem list.    I have reviewed and agree with the HPI, ROS, and historical information as entered above. Petey Mccarty MD        EXAM:     Prenatal Vitals  BP: 98/56  Weight: 60.4 kg (133 lb 3.2 oz)       Pelvic Exam:        Urine Glucose Read-only: Negative  Urine Protein Read-only: Negative           Assessment and Plan    Problem List Items Addressed This Visit        Gravid and     AMA (advanced maternal age) multigravida 35+    Relevant Orders    US Ob 14 + Weeks Single or First Gestation   Other Visit Diagnoses     Prenatal care in second trimester    -  Primary    Relevant Orders    POC Urinalysis Dipstick (Completed)    Alpha Fetoprotein, Maternal          1. Pregnancy at 17w1d  2. Fetal status reassuring.    3. Counseled on MSAFP alone in relation to OTD and placental issues.    4. Anatomy scan next visit.   5. Activity and Exercise discussed.  6. U/S ordered at follow up  7. Patient is on Prenatal vitamins  8. Discussed bASA for PIH prevention from 12 to 36wk  Return in about 3 weeks (around 4/4/2023) for Routine prenatal care and anatomy scan.    Petey Mccarty MD  03/14/2023

## 2023-03-16 LAB
AFP INTERP SERPL-IMP: NORMAL
AFP INTERP SERPL-IMP: NORMAL
AFP MOM SERPL: 1.26
AFP SERPL-MCNC: 54.3 NG/ML
AGE AT DELIVERY: 41 YR
GA METHOD: NORMAL
GA: 17.1 WEEKS
IDDM PATIENT QL: NO
LABORATORY COMMENT REPORT: NORMAL
MULTIPLE PREGNANCY: NO
NEURAL TUBE DEFECT RISK FETUS: 5411 %
RESULT: NORMAL

## 2023-04-04 ENCOUNTER — ROUTINE PRENATAL (OUTPATIENT)
Dept: OBSTETRICS AND GYNECOLOGY | Facility: CLINIC | Age: 43
End: 2023-04-04
Payer: COMMERCIAL

## 2023-04-04 VITALS — BODY MASS INDEX: 21.92 KG/M2 | SYSTOLIC BLOOD PRESSURE: 106 MMHG | WEIGHT: 135.8 LBS | DIASTOLIC BLOOD PRESSURE: 62 MMHG

## 2023-04-04 DIAGNOSIS — Z34.90 PRENATAL CARE, ANTEPARTUM: Primary | ICD-10-CM

## 2023-04-04 DIAGNOSIS — O44.20 MARGINAL PLACENTA PREVIA: ICD-10-CM

## 2023-04-04 DIAGNOSIS — R68.89 SMALL HEAD CIRCUMFERENCE: ICD-10-CM

## 2023-04-04 LAB
GLUCOSE UR STRIP-MCNC: NEGATIVE MG/DL
PROT UR STRIP-MCNC: NEGATIVE MG/DL

## 2023-04-04 PROCEDURE — 0502F SUBSEQUENT PRENATAL CARE: CPT

## 2023-04-04 PROCEDURE — 81002 URINALYSIS NONAUTO W/O SCOPE: CPT

## 2023-04-04 NOTE — PROGRESS NOTES
OB FOLLOW UP  CC- Here for care of pregnancy        Jeanne Bauman is a 42 y.o.  20w1d patient being seen today for her obstetrical follow up visit. Patient reports occasional headaches relieved with rest and water.     Her prenatal care is complicated by (and status) :   Patient Active Problem List   Diagnosis   • Lipoma of anterior chest wall   • Insomnia   • Anxiety   • Heart palpitations   • FH: factor V Leiden mutation   • Abnormal mammogram of left breast   • Pregnancy   • Multigravida of advanced maternal age in first trimester   • Multigravida of advanced maternal age in first trimester   • AMA (advanced maternal age) multigravida 35+     ROS -   Patient Denies: Loss of Fluid, Vaginal Spotting, Vision Changes, Nausea , Vomiting , Contractions and Epigastric pain  Fetal Movement : normal  All other systems reviewed and are negative.       The additional following portions of the patient's history were reviewed and updated as appropriate: allergies and current medications.    I have reviewed and agree with the HPI, ROS, and historical information as entered above. Latonya Gasca, APRN    /62   Wt 61.6 kg (135 lb 12.8 oz)   LMP 2022 (Exact Date)   BMI 21.92 kg/m²       EXAM:     Prenatal Vitals  BP: 106/62  Weight: 61.6 kg (135 lb 12.8 oz)   Fetal Heart Rate: 160          Urine Glucose Read-only: Negative  Urine Protein Read-only: Negative       Assessment and Plan    Problem List Items Addressed This Visit    None  Visit Diagnoses     Prenatal care, antepartum    -  Primary    Relevant Orders    POC Urinalysis Dipstick (Completed)    Marginal placenta previa        Relevant Orders    [facility]    Small head circumference        Relevant Orders    [facility]          1. Pregnancy at 20w1d  2. Ultrasound Today: Yes. 160bpm, cephalic presentation, posterior placenta, marginal previa, marginal pci measuring 11mm from superior edge,  anechoic area present at the superior edge of placenta measuring 27 x 8 x 25mm, AF normal. Reviewed with Dr. Mccarty patient to follow up with [facility] in four weeks then our office after.  3. Anatomy scan today is complete with abnormal findings of marginal previa refer to [facility] for evaluation in 4 weeks. and refer to [facility]  4. Fetal status reassuring.   5. Activity and Exercise discussed.  6. Patient is on Prenatal vitamins  7. Pelvic rest.    Latonya Gasca, APRN  04/04/2023

## 2023-05-03 ENCOUNTER — OFFICE VISIT (OUTPATIENT)
Dept: OBSTETRICS AND GYNECOLOGY | Facility: HOSPITAL | Age: 43
End: 2023-05-03
Payer: COMMERCIAL

## 2023-05-03 ENCOUNTER — HOSPITAL ENCOUNTER (OUTPATIENT)
Dept: WOMENS IMAGING | Facility: HOSPITAL | Age: 43
Discharge: HOME | End: 2023-05-03
Payer: COMMERCIAL

## 2023-05-03 ENCOUNTER — ROUTINE PRENATAL (OUTPATIENT)
Dept: OBSTETRICS AND GYNECOLOGY | Facility: CLINIC | Age: 43
End: 2023-05-03
Payer: COMMERCIAL

## 2023-05-03 VITALS — SYSTOLIC BLOOD PRESSURE: 116 MMHG | BODY MASS INDEX: 22.76 KG/M2 | DIASTOLIC BLOOD PRESSURE: 74 MMHG | WEIGHT: 141 LBS

## 2023-05-03 VITALS — SYSTOLIC BLOOD PRESSURE: 96 MMHG | BODY MASS INDEX: 22.73 KG/M2 | DIASTOLIC BLOOD PRESSURE: 56 MMHG | WEIGHT: 140.8 LBS

## 2023-05-03 DIAGNOSIS — Z3A.24 24 WEEKS GESTATION OF PREGNANCY: ICD-10-CM

## 2023-05-03 DIAGNOSIS — Z34.90 PREGNANCY, UNSPECIFIED GESTATIONAL AGE: ICD-10-CM

## 2023-05-03 DIAGNOSIS — O44.20 MARGINAL PLACENTA PREVIA: ICD-10-CM

## 2023-05-03 DIAGNOSIS — O09.522 MULTIGRAVIDA OF ADVANCED MATERNAL AGE IN SECOND TRIMESTER: Primary | ICD-10-CM

## 2023-05-03 DIAGNOSIS — Z83.2 FH: FACTOR V LEIDEN MUTATION: ICD-10-CM

## 2023-05-03 DIAGNOSIS — R68.89 SMALL HEAD CIRCUMFERENCE: ICD-10-CM

## 2023-05-03 PROBLEM — O09.521 MULTIGRAVIDA OF ADVANCED MATERNAL AGE IN FIRST TRIMESTER: Status: RESOLVED | Noted: 2020-09-16 | Resolved: 2023-05-03

## 2023-05-03 PROBLEM — O09.521 MULTIGRAVIDA OF ADVANCED MATERNAL AGE IN FIRST TRIMESTER: Status: RESOLVED | Noted: 2020-10-21 | Resolved: 2023-05-03

## 2023-05-03 PROCEDURE — 76811 OB US DETAILED SNGL FETUS: CPT

## 2023-05-03 NOTE — PROGRESS NOTES
"Documentation of the ultrasound findings, images, and interpretations will be available in the patient's Viewpoint report which is located in the imaging tab in chart revie      Maternal/Fetal Medicine Consult Note     Name: Jeanne Bauamn    : 1980     MRN: [id number]     Referring Provider: NETTE Alfaro    Chief Complaint  AMA, marginal previa,     Subjective     History of Present Illness:  Jeanne Bauman is a 42 y.o.  24w2d who presents today for AMA    BEBETO: Estimated Date of Delivery: 23     ROS:   As noted in HPI.     Past Medical History:   Diagnosis Date   • History of postpartum hemorrhage    • Ovarian cyst     about 5-6 years ago   • Palpitations     worsened with pregnancy, saw cardiology      Past Surgical History:   Procedure Laterality Date   • TUBAL ABDOMINAL LIGATION      Reports reversed in  and no birth control since then. Takes prenatal daily      OB History        8    Para   4    Term   3       1    AB   3    Living   4       SAB   3    IAB   0    Ectopic   0    Molar   0    Multiple   0    Live Births   4          Obstetric Comments   FOB #1-8, pregnancy #1-8             Objective     Vital Signs  BP 96/56   Wt 63.9 kg (140 lb 12.8 oz)   LMP 2022 (Exact Date)   Estimated body mass index is 22.73 kg/m² as calculated from the following:    Height as of 8/15/22: 167.6 cm (66\").    Weight as of this encounter: 63.9 kg (140 lb 12.8 oz).    Physical Exam    Ultrasound Impression:   See viewpoint    Assessment and Plan     Jeanne Bauman is a 42 y.o.  24w2d who presents today for AMA    Diagnoses and all orders for this visit:    1. Multigravida of advanced maternal age in second trimester (Primary)  Assessment & Plan:  The patient will be 43 years old at the time of delivery.  She was quoted the following age-related risks at term:  Risk for Down syndrome 1 in 80, risk for any chromosomal abnormality 1 in 50.  Options in " genetic testing and genetic screening were discussed with the patient.  I noted an option of genetic testing in the 2nd trimester of transabdominal amniocentesis for the determination of fetal chromosomal complement as well as amniotic fluid alpha-fetoprotein for the evaluation of a fetal open neural tube defect.  A procedure-related fetal loss rate of 1 in 500 would be quoted with midtrimester amniocentesis.  I also discussed the option of analysis of cell-free fetal DNA in maternal blood.  I noted this directed analysis measures the relative proportion of chromosomes with the detection rate of fetal Down syndrome quoted as 99% with a false positive rate of less than .1%.  Screening for other fetal aneuploidies is also possible but the detection rate is lower with cell-free fetal DNA technology.  I then discussed the clinical utility of ultrasound and/or biochemical screening for the detection of fetal aneuploidy as well as fetal open neural tube defects.  Further, I have reviewed her self-reported family history and made suggestions as appropriate based on my review.    Patient has already had a cfDNA screen that returned as low risk.  We discussed that this significantly lowered the risk of chromosomal abnormalities but does not eliminate risk.  After careful consideration, patient declines amniocentesis.      2. FH: factor V Leiden mutation    3. Pregnancy, unspecified gestational age    4. Marginal placenta previa  Assessment & Plan:  Patient previously noted to have a marginal placenta previa.  On ultrasound today the placenta appears to be low-lying approximately 1.8 cm from the internal os.  This most likely will resolve as the pregnancy progresses we will rescan the patient at 32 weeks gestation to look for late findings of trisomy, fetal growth and ensure placental location allows for vaginal delivery.         Follow Up  F/U 8 weeks.    I spent 15 minutes caring for the patient on the day of service. This  included: obtaining or reviewing a separately obtained medical history, reviewing patient records, performing a medically appropriate exam and/or evaluation, counseling or educating the patient/family/caregiver, ordering medications, labs, and/or procedures and documenting such in the medical record. This does not include time spent on review and interpretation of other tests such as fetal ultrasound or the performance of other procedures such as amniocentesis or CVS.      Douglas A. Milligan, MD  Maternal Fetal Medicine, [facility]     2023

## 2023-05-03 NOTE — ASSESSMENT & PLAN NOTE
Patient previously noted to have a marginal placenta previa.  On ultrasound today the placenta appears to be low-lying approximately 1.8 cm from the internal os.  This most likely will resolve as the pregnancy progresses we will rescan the patient at 32 weeks gestation to look for late findings of trisomy, fetal growth and ensure placental location allows for vaginal delivery.

## 2023-05-03 NOTE — PROGRESS NOTES
OB FOLLOW UP  CC- Here for care of pregnancy        Jeanne Bauman is a 42 y.o.  24w2d patient being seen today for her obstetrical follow up visit. Patient reports no complaints..     Her prenatal care is complicated by (and status) : AMA  Patient Active Problem List   Diagnosis   • Lipoma of anterior chest wall   • Insomnia   • Anxiety   • Heart palpitations   • FH: factor V Leiden mutation   • Abnormal mammogram of left breast   • Pregnancy   • AMA (advanced maternal age) multigravida 35+   • Marginal placenta previa         Ultrasound Today: Yes at [facility]    ROS -   Patient Reports : No Problems  Patient Denies: Loss of Fluid, Vaginal Spotting, Vision Changes, Headaches, Nausea , Vomiting , Contractions and Epigastric pain  Fetal Movement : normal  All other systems reviewed and are negative.       The additional following portions of the patient's history were reviewed and updated as appropriate: allergies, current medications, past family history, past medical history, past social history, past surgical history and problem list.    I have reviewed and agree with the HPI, ROS, and historical information as entered above. Petey Mccarty MD    /74   Wt 64 kg (141 lb)   LMP 2022 (Exact Date)   BMI 22.76 kg/m²       EXAM:     Prenatal Vitals  BP: 116/74  Weight: 64 kg (141 lb)                           Assessment and Plan    Problem List Items Addressed This Visit        Family History    FH: factor V Leiden mutation       Gravid and     Pregnancy    AMA (advanced maternal age) multigravida 35+ - Primary       1. Pregnancy at 24w2d  2. Fetal status reassuring.  3. PDC scan reviewed today.  4. 1 hour gtt, CBC, Antibody screen and TDAP next visit. Instructions given  5. Fetal movement/PTL or Labor precautions  6. Reviewed routine prenatal care with the office and educational materials given  7. Discussed/encouraged TDAP vaccination after 28 weeks  8. Reviewed Pre-eclampsia  signs/symptoms  9. Discussed bASA for PIH prevention from 12 to 36wk  10. Activity and Exercise discussed.  Return in about 4 weeks (around 5/31/2023) for Routine prenatal care.    Petey Mccarty MD  05/03/2023

## 2023-05-03 NOTE — ASSESSMENT & PLAN NOTE
The patient will be 43 years old at the time of delivery.  She was quoted the following age-related risks at term:  Risk for Down syndrome 1 in 80, risk for any chromosomal abnormality 1 in 50.  Options in genetic testing and genetic screening were discussed with the patient.  I noted an option of genetic testing in the 2nd trimester of transabdominal amniocentesis for the determination of fetal chromosomal complement as well as amniotic fluid alpha-fetoprotein for the evaluation of a fetal open neural tube defect.  A procedure-related fetal loss rate of 1 in 500 would be quoted with midtrimester amniocentesis.  I also discussed the option of analysis of cell-free fetal DNA in maternal blood.  I noted this directed analysis measures the relative proportion of chromosomes with the detection rate of fetal Down syndrome quoted as 99% with a false positive rate of less than .1%.  Screening for other fetal aneuploidies is also possible but the detection rate is lower with cell-free fetal DNA technology.  I then discussed the clinical utility of ultrasound and/or biochemical screening for the detection of fetal aneuploidy as well as fetal open neural tube defects.  Further, I have reviewed her self-reported family history and made suggestions as appropriate based on my review.    Patient has already had a cfDNA screen that returned as low risk.  We discussed that this significantly lowered the risk of chromosomal abnormalities but does not eliminate risk.  After careful consideration, patient declines amniocentesis.

## 2023-05-03 NOTE — LETTER
May 3, 2023     NETTE Alfaro  [address] [facility] [address]  [address] [place] [id number]    Patient: Jeanne Bauman   YOB: 1980   Date of Visit: 5/3/2023       Dear NETTE Alfaro,    Thank you for referring Jeanne Bauman to me for evaluation. Below is a copy of my consult note.    If you have questions, please do not hesitate to call me. I look forward to following Jeanne along with you.         Sincerely,        Douglas A. Milligan, MD        CC: No Recipients    No complaints today, Next f/u with [facility] today, NIPT - wnl     Documentation of the ultrasound findings, images, and interpretations will be available in the patient's Viewpoint report which is located in the imaging tab in chart revie      Maternal/Fetal Medicine Consult Note     Name: Jeanne Bauman    : 1980     MRN: [id number]     Referring Provider: NETTE Alfaro    Chief Complaint  AMA, marginal previa,     Subjective     History of Present Illness:  Jeanne Bauman is a 42 y.o.  24w2d who presents today for AMA    BEBETO: Estimated Date of Delivery: 23     ROS:   As noted in HPI.     Past Medical History:   Diagnosis Date   • History of postpartum hemorrhage    • Ovarian cyst     about 5-6 years ago   • Palpitations     worsened with pregnancy, saw cardiology      Past Surgical History:   Procedure Laterality Date   • TUBAL ABDOMINAL LIGATION  2013    Reports reversed in 2018 and no birth control since then. Takes prenatal daily      OB History          8    Para   4    Term   3       1    AB   3    Living   4         SAB   3    IAB   0    Ectopic   0    Molar   0    Multiple   0    Live Births   4          Obstetric Comments   FOB #1-8, pregnancy #1-8               Objective     Vital Signs  BP 96/56   Wt 63.9 kg (140 lb 12.8 oz)   LMP 2022 (Exact Date)   Estimated body mass index is 22.73 kg/m² as calculated from the following:    Height as of 8/15/22: 167.6 cm  "(66\").    Weight as of this encounter: 63.9 kg (140 lb 12.8 oz).    Physical Exam    Ultrasound Impression:   See viewpoint    Assessment and Plan     Jeanne Bauman is a 42 y.o.  24w2d who presents today for AMA    Diagnoses and all orders for this visit:    1. Multigravida of advanced maternal age in second trimester (Primary)  Assessment & Plan:  The patient will be 43 years old at the time of delivery.  She was quoted the following age-related risks at term:  Risk for Down syndrome 1 in 80, risk for any chromosomal abnormality 1 in 50.  Options in genetic testing and genetic screening were discussed with the patient.  I noted an option of genetic testing in the 2nd trimester of transabdominal amniocentesis for the determination of fetal chromosomal complement as well as amniotic fluid alpha-fetoprotein for the evaluation of a fetal open neural tube defect.  A procedure-related fetal loss rate of 1 in 500 would be quoted with midtrimester amniocentesis.  I also discussed the option of analysis of cell-free fetal DNA in maternal blood.  I noted this directed analysis measures the relative proportion of chromosomes with the detection rate of fetal Down syndrome quoted as 99% with a false positive rate of less than .1%.  Screening for other fetal aneuploidies is also possible but the detection rate is lower with cell-free fetal DNA technology.  I then discussed the clinical utility of ultrasound and/or biochemical screening for the detection of fetal aneuploidy as well as fetal open neural tube defects.  Further, I have reviewed her self-reported family history and made suggestions as appropriate based on my review.    Patient has already had a cfDNA screen that returned as low risk.  We discussed that this significantly lowered the risk of chromosomal abnormalities but does not eliminate risk.  After careful consideration, patient declines amniocentesis.      2. FH: factor V Leiden mutation    3. " Pregnancy, unspecified gestational age    4. Marginal placenta previa  Assessment & Plan:  Patient previously noted to have a marginal placenta previa.  On ultrasound today the placenta appears to be low-lying approximately 1.8 cm from the internal os.  This most likely will resolve as the pregnancy progresses we will rescan the patient at 32 weeks gestation to look for late findings of trisomy, fetal growth and ensure placental location allows for vaginal delivery.         Follow Up  F/U 8 weeks.    I spent 15 minutes caring for the patient on the day of service. This included: obtaining or reviewing a separately obtained medical history, reviewing patient records, performing a medically appropriate exam and/or evaluation, counseling or educating the patient/family/caregiver, ordering medications, labs, and/or procedures and documenting such in the medical record. This does not include time spent on review and interpretation of other tests such as fetal ultrasound or the performance of other procedures such as amniocentesis or CVS.      Douglas A. Milligan, MD  Maternal Fetal Medicine, [facility]     2023

## 2023-05-22 ENCOUNTER — TELEPHONE (OUTPATIENT)
Dept: OBSTETRICS AND GYNECOLOGY | Facility: CLINIC | Age: 43
End: 2023-05-22
Payer: COMMERCIAL

## 2023-05-22 NOTE — TELEPHONE ENCOUNTER
Returned patient's call. 27w 0d with marginal previa. States she had scant amounts of red spotting only when she wiped last night after going for a walk. Also had some cramping type low back discomfort. States spotting resolved after one hour, has minimal crampy back pain this morning. States is compliant with pelvic rest. MBT B positive. States she is supposed to leave 5/24/23 to go to [employer] and unsure if prolonged walking is okay.  Reports normal fetal movement.

## 2023-05-22 NOTE — TELEPHONE ENCOUNTER
Discussed with NETTE Woodruff. Okay for patient to travel but don't recommend lots of walking. Suggest she sit often or use a wheelchair. Patient v/u and agreed.

## 2023-05-22 NOTE — TELEPHONE ENCOUNTER
Patient is calling because yesterday she was having some spotting, and having back cramps. Patient did state the spotting ease up, after she laid down for the evening    Epidermal Sutures: 5-0 Fast Absorbing Gut

## 2023-05-31 ENCOUNTER — ROUTINE PRENATAL (OUTPATIENT)
Dept: OBSTETRICS AND GYNECOLOGY | Facility: CLINIC | Age: 43
End: 2023-05-31

## 2023-05-31 VITALS — DIASTOLIC BLOOD PRESSURE: 68 MMHG | SYSTOLIC BLOOD PRESSURE: 112 MMHG | WEIGHT: 144.4 LBS | BODY MASS INDEX: 23.31 KG/M2

## 2023-05-31 DIAGNOSIS — O44.20 MARGINAL PLACENTA PREVIA: ICD-10-CM

## 2023-05-31 DIAGNOSIS — Z34.93 THIRD TRIMESTER PREGNANCY: Primary | ICD-10-CM

## 2023-05-31 DIAGNOSIS — O09.523 MULTIGRAVIDA OF ADVANCED MATERNAL AGE IN THIRD TRIMESTER: ICD-10-CM

## 2023-05-31 LAB
EXPIRATION DATE: 0
GLUCOSE UR STRIP-MCNC: NEGATIVE MG/DL
Lab: 0
PROT UR STRIP-MCNC: NEGATIVE MG/DL

## 2023-05-31 NOTE — PROGRESS NOTES
OB FOLLOW UP  CC- Here for care of pregnancy        Jeanne Bauman is a 42 y.o.  28w2d patient being seen today for her obstetrical follow up. Patient reports nausea without vomiting. A week ago pink spotting and mild cramping. She states she went for a long walk before this began.      Patient undergoing Glucola testing today. She is due for her testing at 11:17am.       MBT: B+  Rhogam: not indicated  28 week packet: reviewed with patient , counseled on fetal movement , pediatrician list reviewed, breast pump discussed and childbirth classes reviewed  TDAP: given today  Ultrasound Today: No    Her prenatal care is complicated by (and status) :    Patient Active Problem List   Diagnosis   • Lipoma of anterior chest wall   • Insomnia   • Anxiety   • Heart palpitations   • FH: factor V Leiden mutation   • Abnormal mammogram of left breast   • Pregnancy   • AMA (advanced maternal age) multigravida 35+   • Marginal placenta previa         ROS -   Patient Reports : Nausea  Patient Denies: Loss of Fluid, Vision Changes, Headaches, Vomiting , Contractions and Epigastric pain  Fetal Movement : normal    The additional following portions of the patient's history were reviewed and updated as appropriate: allergies and current medications.    I have reviewed and agree with the HPI, ROS, and historical information as entered above. Petey Mccarty MD      /68   Wt 65.5 kg (144 lb 6.4 oz)   LMP 2022 (Exact Date)   BMI 23.31 kg/m²         EXAM:     Prenatal Vitals  BP: 112/68  Weight: 65.5 kg (144 lb 6.4 oz)                   Urine Glucose Read-only: Negative  Urine Protein Read-only: Negative       Assessment and Plan    Problem List Items Addressed This Visit        Gravid and     AMA (advanced maternal age) multigravida 35+    Marginal placenta previa   Other Visit Diagnoses     Third trimester pregnancy    -  Primary    Relevant Orders    Antibody Screen    CBC (No Diff)     Gestational Screen 1 Hr ([facility])    Tdap Vaccine Greater Than or Equal To 8yo IM (Completed)    POC Glucose, Urine, Qualitative, Dipstick (Completed)    POC Protein, Urine, Qualitative, Dipstick (Completed)          1. Pregnancy at 28w2d  2. 1 hr Glucola, CBC, and antibody screen today  and TDAP given today  3. Fetal movement/PTL or Labor precautions  4. Lab(s) Ordered  5. Patient is on Prenatal vitamins  6. Reviewed Pre-eclampsia signs/symptoms  7. Discussed bASA for PIH prevention from 12 to 36wk  8. hs f/u scan scheduled with [facility]  9. Activity and Exercise discussed.  Return in about 2 weeks (around 6/14/2023) for Routine prenatal care.    Petey Mccarty MD  05/31/2023

## 2023-06-01 LAB
BLD GP AB SCN SERPL QL: NEGATIVE
ERYTHROCYTE [DISTWIDTH] IN BLOOD BY AUTOMATED COUNT: 11.9 % (ref 12.3–15.4)
GLUCOSE 1H P 50 G GLC PO SERPL-MCNC: 92 MG/DL (ref 65–139)
HCT VFR BLD AUTO: 33.6 % (ref 34–46.6)
HGB BLD-MCNC: 11.6 G/DL (ref 12–15.9)
MCH RBC QN AUTO: 31.1 PG (ref 26.6–33)
MCHC RBC AUTO-ENTMCNC: 34.5 G/DL (ref 31.5–35.7)
MCV RBC AUTO: 90.1 FL (ref 79–97)
PLATELET # BLD AUTO: 205 10*3/MM3 (ref 140–450)
RBC # BLD AUTO: 3.73 10*6/MM3 (ref 3.77–5.28)
WBC # BLD AUTO: 6.72 10*3/MM3 (ref 3.4–10.8)

## 2023-06-01 RX ORDER — FERROUS SULFATE 325(65) MG
325 TABLET ORAL
Qty: 30 TABLET | Refills: 1 | Status: SHIPPED | OUTPATIENT
Start: 2023-06-01

## 2023-06-14 ENCOUNTER — ROUTINE PRENATAL (OUTPATIENT)
Dept: OBSTETRICS AND GYNECOLOGY | Facility: CLINIC | Age: 43
End: 2023-06-14
Payer: COMMERCIAL

## 2023-06-14 VITALS — DIASTOLIC BLOOD PRESSURE: 68 MMHG | WEIGHT: 145.8 LBS | SYSTOLIC BLOOD PRESSURE: 102 MMHG | BODY MASS INDEX: 23.53 KG/M2

## 2023-06-14 DIAGNOSIS — Z34.80 SUPERVISION OF OTHER NORMAL PREGNANCY, ANTEPARTUM: ICD-10-CM

## 2023-06-14 DIAGNOSIS — O09.523 MULTIGRAVIDA OF ADVANCED MATERNAL AGE IN THIRD TRIMESTER: Primary | ICD-10-CM

## 2023-06-14 PROCEDURE — 0502F SUBSEQUENT PRENATAL CARE: CPT | Performed by: OBSTETRICS & GYNECOLOGY

## 2023-06-14 NOTE — PROGRESS NOTES
OB FOLLOW UP  CC- Here for care of pregnancy        Jeanne Bauman is a 42 y.o.  30w2d patient being seen today for her obstetrical follow up visit. Patient reports heartburn. She is taking OTC (Tums) medication for treatment currently and it is not helping.    Her prenatal care is complicated by (and status) :  AMA  Patient Active Problem List   Diagnosis    Lipoma of anterior chest wall    Insomnia    Anxiety    Heart palpitations    FH: factor V Leiden mutation    Abnormal mammogram of left breast    Pregnancy    AMA (advanced maternal age) multigravida 35+    Marginal placenta previa    Supervision of other normal pregnancy, antepartum       TDAP status: received at last visit  Rhogam status: was not indicated  28 week labs: Reviewed, normal, and Labs show anemia. She is taking additional iron supplement.  Ultrasound Today: No  Non Stress Test: No.      ROS -   Patient Reports :  see above  Patient Denies: Loss of Fluid, Vaginal Spotting, Vision Changes, Headaches, Nausea , Vomiting , and Contractions  Fetal Movement : normal  All other systems reviewed and are negative.       The additional following portions of the patient's history were reviewed and updated as appropriate: allergies, current medications, past family history, past medical history, past social history, past surgical history, and problem list.    I have reviewed and agree with the HPI, ROS, and historical information as entered above. Petey Mccarty MD      /68   Wt 66.1 kg (145 lb 12.8 oz)   LMP 2022 (Exact Date)   BMI 23.53 kg/m²         EXAM:     Prenatal Vitals  BP: 102/68  Weight: 66.1 kg (145 lb 12.8 oz)                               Assessment and Plan    Problem List Items Addressed This Visit          Gravid and     AMA (advanced maternal age) multigravida 35+ - Primary    Supervision of other normal pregnancy, antepartum       Pregnancy at 30w2d  Fetal status reassuring.  28 week labs  reviewed.    Activity and Exercise discussed.  Fetal movement/PTL or Labor precautions  Patient is on Prenatal vitamins  Reviewed Pre-eclampsia signs/symptoms  Discussed bASA for PIH prevention from 12 to 36wk  Return in about 2 weeks (around 6/28/2023) for Routine prenatal care after PDC scan.    Petey Mccarty MD  06/14/2023

## 2023-07-26 ENCOUNTER — ROUTINE PRENATAL (OUTPATIENT)
Dept: OBSTETRICS AND GYNECOLOGY | Facility: CLINIC | Age: 43
End: 2023-07-26
Payer: COMMERCIAL

## 2023-07-26 ENCOUNTER — LAB (OUTPATIENT)
Dept: LAB | Facility: HOSPITAL | Age: 43
End: 2023-07-26
Payer: COMMERCIAL

## 2023-07-26 VITALS — DIASTOLIC BLOOD PRESSURE: 72 MMHG | WEIGHT: 148.8 LBS | SYSTOLIC BLOOD PRESSURE: 102 MMHG | BODY MASS INDEX: 24.02 KG/M2

## 2023-07-26 DIAGNOSIS — O09.529 ANTEPARTUM MULTIGRAVIDA OF ADVANCED MATERNAL AGE: ICD-10-CM

## 2023-07-26 DIAGNOSIS — O44.43 LOW-LYING PLACENTA WITHOUT HEMORRHAGE, THIRD TRIMESTER: ICD-10-CM

## 2023-07-26 DIAGNOSIS — Z83.2 FH: FACTOR V LEIDEN MUTATION: ICD-10-CM

## 2023-07-26 DIAGNOSIS — Z34.93 PRENATAL CARE IN THIRD TRIMESTER: Primary | ICD-10-CM

## 2023-07-26 DIAGNOSIS — Z34.93 PRENATAL CARE IN THIRD TRIMESTER: ICD-10-CM

## 2023-07-26 LAB
GLUCOSE UR STRIP-MCNC: NEGATIVE MG/DL
PROT UR STRIP-MCNC: NEGATIVE MG/DL

## 2023-07-26 PROCEDURE — 87081 CULTURE SCREEN ONLY: CPT

## 2023-07-26 NOTE — PATIENT INSTRUCTIONS
Prenatal Care  Prenatal care is health care during pregnancy. It helps you and your unborn baby (fetus) stay as healthy as possible. Prenatal care may be provided by a midwife, a family practice doctor, a mid-level practitioner (nurse practitioner or physician assistant), or a childbirth and pregnancy doctor (obstetrician).  How does this affect me?  During pregnancy, you will be closely monitored for any new conditions that might develop. To lower your risk of pregnancy complications, you and your health care provider will talk about any underlying conditions you have.  How does this affect my baby?  Early and consistent prenatal care increases the chance that your baby will be healthy during pregnancy. Prenatal care lowers the risk that your baby will be:  Born early (prematurely).  Smaller than expected at birth (small for gestational age).  What can I expect at the first prenatal care visit?  Your first prenatal care visit will likely be the longest. You should schedule your first prenatal care visit as soon as you know that you are pregnant. Your first visit is a good time to talk about any questions or concerns you have about pregnancy.  Medical history  At your visit, you and your health care provider will talk about your medical history, including:  Any past pregnancies.  Your family's medical history.  Medical history of the baby's father.  Any long-term (chronic) health conditions you have and how you manage them.  Any surgeries or procedures you have had.  Any current over-the-counter or prescription medicines, herbs, or supplements that you are taking.  Other factors that could pose a risk to your baby, including:  Exposure to harmful chemicals or radiation at work or at home.  Any substance use, including tobacco, alcohol, and drug use.  Your home setting and your stress levels, including:  Exposure to abuse or violence.  Household financial strain.  Your daily health habits, including diet and  exercise.  Tests and screenings  Your health care provider will:  Measure your weight, height, and blood pressure.  Do a physical exam, including a pelvic and breast exam.  Perform blood tests and urine tests to check for:  Urinary tract infection.  Sexually transmitted infections (STIs).  Low iron levels in your blood (anemia).  Blood type and certain proteins on red blood cells (Rh antibodies).  Infections and immunity to viruses, such as hepatitis B and rubella.  HIV (human immunodeficiency virus).  Discuss your options for genetic screening.  Tips about staying healthy  Your health care provider will also give you information about how to keep yourself and your baby healthy, including:  Nutrition and taking vitamins.  Physical activity.  How to manage pregnancy symptoms such as nausea and vomiting (morning sickness).  Infections and substances that may be harmful to your baby and how to avoid them.  Food safety.  Dental care.  Working.  Travel.  Warning signs to watch for and when to call your health care provider.  How often will I have prenatal care visits?  After your first prenatal care visit, you will have regular visits throughout your pregnancy. The visit schedule is often as follows:  Up to week 28 of pregnancy: once every 4 weeks.  28-36 weeks: once every 2 weeks.  After 36 weeks: every week until delivery.  Some women may have visits more or less often depending on any underlying health conditions and the health of the baby.  Keep all follow-up and prenatal care visits. This is important.  What happens during routine prenatal care visits?  Your health care provider will:  Measure your weight and blood pressure.  Check for fetal heart sounds.  Measure the height of your uterus in your abdomen (fundal height). This may be measured starting around week 20 of pregnancy.  Check the position of your baby inside your uterus.  Ask questions about your diet, sleeping patterns, and whether you can feel the baby  move.  Review warning signs to watch for and signs of labor.  Ask about any pregnancy symptoms you are having and how you are dealing with them. Symptoms may include:  Headaches.  Nausea and vomiting.  Vaginal discharge.  Swelling.  Fatigue.  Constipation.  Changes in your vision.  Feeling persistently sad or anxious.  Any discomfort, including back or pelvic pain.  Bleeding or spotting.  Make a list of questions to ask your health care provider at your routine visits.  What tests might I have during prenatal care visits?  You may have blood, urine, and imaging tests throughout your pregnancy, such as:  Urine tests to check for glucose, protein, or signs of infection.  Glucose tests to check for a form of diabetes that can develop during pregnancy (gestational diabetes mellitus). This is usually done around week 24 of pregnancy.  Ultrasounds to check your baby's growth and development, to check for birth defects, and to check your baby's well-being. These can also help to decide when you should deliver your baby.  A test to check for group B strep (GBS) infection. This is usually done around week 36 of pregnancy.  Genetic testing. This may include blood, fluid, or tissue sampling, or imaging tests, such as an ultrasound. Some genetic tests are done during the first trimester and some are done during the second trimester.  What else can I expect during prenatal care visits?  Your health care provider may recommend getting certain vaccines during pregnancy. These may include:  A yearly flu shot (annual influenza vaccine). This is especially important if you will be pregnant during flu season.  Tdap (tetanus, diphtheria, pertussis) vaccine. Getting this vaccine during pregnancy can protect your baby from whooping cough (pertussis) after birth. This vaccine may be recommended between weeks 27 and 36 of pregnancy.  A COVID-19 vaccine.  Later in your pregnancy, your health care provider may give you information  about:  Childbirth and breastfeeding classes.  Choosing a health care provider for your baby.  Umbilical cord banking.  Breastfeeding.  Birth control after your baby is born.  The hospital labor and delivery unit and how to set up a tour.  Registering at the hospital before you go into labor.  Where to find more information  Office on Women's Health: womenshealth.gov  American Pregnancy Association: americanpregnancy.org  March of Dimes: marchofdimes.org  Summary  Prenatal care helps you and your baby stay as healthy as possible during pregnancy.  Your first prenatal care visit will most likely be the longest.  You will have visits and tests throughout your pregnancy to monitor your health and your baby's health.  Bring a list of questions to your visits to ask your health care provider.  Make sure to keep all follow-up and prenatal care visits.  This information is not intended to replace advice given to you by your health care provider. Make sure you discuss any questions you have with your health care provider.  Document Revised: 09/30/2021 Document Reviewed: 09/30/2021  [employer] Patient Education © 2023 [employer]

## 2023-07-26 NOTE — PROGRESS NOTES
OB FOLLOW UP  CC- Here for care of pregnancy        Jeanne Bauman is a 42 y.o.  36w2d patient being seen today for her obstetrical follow up visit. Patient reports nausea, cramping, karissa guy, and spotting. She reports that nausea is occurring mostly at night without vomiting. She reports scant spotting when she wipes for the past 12 hours.     Her prenatal care is complicated by (and status) : AMA  Patient Active Problem List   Diagnosis    Lipoma of anterior chest wall    Insomnia    Anxiety    Heart palpitations    FH: factor V Leiden mutation    Abnormal mammogram of left breast    Pregnancy    AMA (advanced maternal age) multigravida 35+    Marginal placenta previa    Supervision of other normal pregnancy, antepartum    Low-lying placenta without hemorrhage, third trimester       GBS Status: Done Today. She is not allergic to PCN.    No Known Allergies       Her Delivery Plan is: Primary . Scheduled    US today: no  Non Stress Test: No.    ROS -   Patient Reports :  See above  Patient Denies: Loss of Fluid, Vision Changes, Headaches, Contractions, and Epigastric pain  Fetal Movement : normal  All other systems reviewed and are negative.       The additional following portions of the patient's history were reviewed and updated as appropriate: allergies, current medications, past family history, past medical history, past social history, past surgical history, and problem list.    I have reviewed and agree with the HPI, ROS, and historical information as entered above. Petey Mccarty MD        EXAM:     Prenatal Vitals  BP: 102/72  Weight: 67.5 kg (148 lb 12.8 oz)                  Urine Glucose Read-only: Negative  Urine Protein Read-only: Negative           Assessment and Plan    Problem List Items Addressed This Visit          Family History    FH: factor V Leiden mutation       Gravid and     AMA (advanced maternal age) multigravida 35+    Low-lying placenta without  hemorrhage, third trimester     Other Visit Diagnoses       Prenatal care in third trimester    -  Primary    Relevant Orders    POC Urinalysis Dipstick (Completed)    Group B Streptococcus Culture - Swab, Vaginal/Rectum            Pregnancy at 36w2d  Fetal status reassuring.   Reviewed Pre-eclampsia signs/symptoms  Reviewed upcoming c/s and PAT instructions with patient. Arrival time and NPO status reviewed.   Delivery options reviewed with patient  Signs of labor reviewed  Kick counts reviewed  Activity and Exercise discussed.  Return in about 1 week (around 8/2/2023) for Routine prenatal care.    Petey Mccarty MD  07/26/2023

## 2023-07-27 ENCOUNTER — PREP FOR SURGERY (OUTPATIENT)
Dept: OTHER | Facility: HOSPITAL | Age: 43
End: 2023-07-27
Payer: COMMERCIAL

## 2023-07-27 DIAGNOSIS — O44.43 LOW-LYING PLACENTA WITHOUT HEMORRHAGE, THIRD TRIMESTER: Primary | ICD-10-CM

## 2023-07-27 RX ORDER — METHYLERGONOVINE MALEATE 0.2 MG/ML
200 INJECTION INTRAVENOUS ONCE AS NEEDED
OUTPATIENT
Start: 2023-07-27

## 2023-07-27 RX ORDER — MISOPROSTOL 200 UG/1
800 TABLET ORAL AS NEEDED
OUTPATIENT
Start: 2023-07-27

## 2023-07-27 RX ORDER — SODIUM CHLORIDE, SODIUM LACTATE, POTASSIUM CHLORIDE, CALCIUM CHLORIDE 600; 310; 30; 20 MG/100ML; MG/100ML; MG/100ML; MG/100ML
125 INJECTION, SOLUTION INTRAVENOUS CONTINUOUS
OUTPATIENT
Start: 2023-07-27

## 2023-07-27 RX ORDER — CARBOPROST TROMETHAMINE 250 UG/ML
250 INJECTION, SOLUTION INTRAMUSCULAR AS NEEDED
OUTPATIENT
Start: 2023-07-27

## 2023-07-27 RX ORDER — OXYTOCIN/0.9 % SODIUM CHLORIDE 30/500 ML
85 PLASTIC BAG, INJECTION (ML) INTRAVENOUS ONCE
OUTPATIENT
Start: 2023-07-27 | End: 2023-07-27

## 2023-07-27 RX ORDER — OXYTOCIN/0.9 % SODIUM CHLORIDE 30/500 ML
650 PLASTIC BAG, INJECTION (ML) INTRAVENOUS ONCE
OUTPATIENT
Start: 2023-07-27 | End: 2023-07-27

## 2023-07-27 RX ORDER — ACETAMINOPHEN 500 MG
1000 TABLET ORAL ONCE
OUTPATIENT
Start: 2023-07-27 | End: 2023-07-27

## 2023-07-27 RX ORDER — TRISODIUM CITRATE DIHYDRATE AND CITRIC ACID MONOHYDRATE 500; 334 MG/5ML; MG/5ML
30 SOLUTION ORAL ONCE
OUTPATIENT
Start: 2023-07-27 | End: 2023-07-27

## 2023-07-27 RX ORDER — KETOROLAC TROMETHAMINE 30 MG/ML
30 INJECTION, SOLUTION INTRAMUSCULAR; INTRAVENOUS ONCE
OUTPATIENT
Start: 2023-07-27 | End: 2023-07-27

## 2023-07-27 RX ORDER — CEFAZOLIN SODIUM 2 G/100ML
2000 INJECTION, SOLUTION INTRAVENOUS ONCE
OUTPATIENT
Start: 2023-07-27 | End: 2023-07-27

## 2023-07-29 LAB — BACTERIA SPEC AEROBE CULT: NORMAL

## 2023-08-02 ENCOUNTER — ROUTINE PRENATAL (OUTPATIENT)
Dept: OBSTETRICS AND GYNECOLOGY | Facility: CLINIC | Age: 43
End: 2023-08-02
Payer: COMMERCIAL

## 2023-08-02 ENCOUNTER — PREP FOR SURGERY (OUTPATIENT)
Dept: OTHER | Facility: HOSPITAL | Age: 43
End: 2023-08-02
Payer: COMMERCIAL

## 2023-08-02 VITALS — DIASTOLIC BLOOD PRESSURE: 64 MMHG | SYSTOLIC BLOOD PRESSURE: 106 MMHG | BODY MASS INDEX: 24.21 KG/M2 | WEIGHT: 150 LBS

## 2023-08-02 DIAGNOSIS — Z34.93 THIRD TRIMESTER PREGNANCY: Primary | ICD-10-CM

## 2023-08-02 DIAGNOSIS — O09.523 MULTIGRAVIDA OF ADVANCED MATERNAL AGE IN THIRD TRIMESTER: ICD-10-CM

## 2023-08-02 DIAGNOSIS — O44.43 LOW-LYING PLACENTA WITHOUT HEMORRHAGE, THIRD TRIMESTER: ICD-10-CM

## 2023-08-09 ENCOUNTER — PREP FOR SURGERY (OUTPATIENT)
Dept: OTHER | Facility: HOSPITAL | Age: 43
End: 2023-08-09
Payer: COMMERCIAL

## 2023-08-09 ENCOUNTER — ROUTINE PRENATAL (OUTPATIENT)
Dept: OBSTETRICS AND GYNECOLOGY | Facility: CLINIC | Age: 43
End: 2023-08-09
Payer: COMMERCIAL

## 2023-08-09 VITALS — WEIGHT: 150.4 LBS | BODY MASS INDEX: 24.28 KG/M2 | DIASTOLIC BLOOD PRESSURE: 62 MMHG | SYSTOLIC BLOOD PRESSURE: 98 MMHG

## 2023-08-09 DIAGNOSIS — Z34.83 PRENATAL CARE, SUBSEQUENT PREGNANCY, THIRD TRIMESTER: Primary | ICD-10-CM

## 2023-08-09 LAB
EXPIRATION DATE: 0
GLUCOSE UR STRIP-MCNC: NEGATIVE MG/DL
Lab: 0
PROT UR STRIP-MCNC: NEGATIVE MG/DL

## 2023-08-09 RX ORDER — FERROUS SULFATE 325(65) MG
325 TABLET ORAL
Qty: 30 TABLET | Refills: 2 | Status: ON HOLD | OUTPATIENT
Start: 2023-08-09

## 2023-08-09 NOTE — PROGRESS NOTES
OB FOLLOW UP  CC- Here for care of pregnancy        Jeanne Bauman is a 42 y.o.  38w2d patient being seen today for her obstetrical follow up visit. Patient reports irregular contractions and nausea without vomiting for 7 days.     Her prenatal care is complicated by (and status) :  see below  Patient Active Problem List   Diagnosis    Lipoma of anterior chest wall    Insomnia    Anxiety    Heart palpitations    FH: factor V Leiden mutation    Abnormal mammogram of left breast    Pregnancy    AMA (advanced maternal age) multigravida 35+    Marginal placenta previa    Supervision of other normal pregnancy, antepartum    Low-lying placenta without hemorrhage, third trimester       GBS Status:   Group B Strep Culture   Date Value Ref Range Status   2023 No Group B Streptococcus isolated  Final         No Known Allergies       Her Delivery Plan is: Primary . Scheduled  2023@0900   today: no  Non Stress Test: Yes minutes >20  non-stress test: NST: Reactive  indication:  Marginal previa     ROS -   Patient Reports :  see above  Patient Denies: Loss of Fluid, Vaginal Spotting, Vision Changes, Headaches, Vomiting , and Epigastric pain  Fetal Movement : normal  All other systems reviewed and are negative.       The additional following portions of the patient's history were reviewed and updated as appropriate: allergies, current medications, past family history, past medical history, past social history, past surgical history, and problem list.    I have reviewed and agree with the HPI, ROS, and historical information as entered above. Antonia Davis, APRN        EXAM:     Prenatal Vitals  BP: 98/62  Weight: 68.2 kg (150 lb 6.4 oz)   Fetal Heart Rate: NST            Urine Glucose Read-only: Negative  Urine Protein Read-only: Negative       Assessment and Plan    Problem List Items Addressed This Visit    None  Visit Diagnoses       Prenatal care, subsequent pregnancy, third trimester    -   Primary    Relevant Medications    ferrous sulfate 325 (65 FE) MG tablet    Other Relevant Orders    POC Protein, Urine, Qualitative, Dipstick (Completed)    POC Glucose, Urine, Qualitative, Dipstick (Completed)            Pregnancy at 38w2d  Fetal status reassuring.   Reviewed Pre-eclampsia signs/symptoms  Reviewed upcoming c/s and PAT instructions with patient. Arrival time and NPO status reviewed.   Signs of labor reviewed  Kick counts reviewed  Activity and Exercise discussed.  Nausea medication offered. Patient declined. She states modifying diet usually improves nausea.     Plan:   Eat frequent small meals Q1-2h, bland or dry foods,  high protein meals or snacks, avoid spicy and fatty foods.    PAT appt 8/11/2023  Return in about 5 days (around 8/14/2023) for NIR ballesteros/ .    Antonia Davis, APRN  08/09/2023

## 2023-08-11 ENCOUNTER — PRE-ADMISSION TESTING (OUTPATIENT)
Dept: PREADMISSION TESTING | Facility: HOSPITAL | Age: 43
End: 2023-08-11
Payer: COMMERCIAL

## 2023-08-11 VITALS — HEIGHT: 66 IN | WEIGHT: 149.69 LBS | BODY MASS INDEX: 24.06 KG/M2

## 2023-08-11 DIAGNOSIS — O44.43 LOW-LYING PLACENTA WITHOUT HEMORRHAGE, THIRD TRIMESTER: ICD-10-CM

## 2023-08-11 LAB
ABO GROUP BLD: NORMAL
BLD GP AB SCN SERPL QL: NEGATIVE
DEPRECATED RDW RBC AUTO: 42.2 FL (ref 37–54)
ERYTHROCYTE [DISTWIDTH] IN BLOOD BY AUTOMATED COUNT: 12.5 % (ref 12.3–15.4)
HCT VFR BLD AUTO: 39.6 % (ref 34–46.6)
HGB BLD-MCNC: 13.4 G/DL (ref 12–15.9)
MCH RBC QN AUTO: 31.2 PG (ref 26.6–33)
MCHC RBC AUTO-ENTMCNC: 33.8 G/DL (ref 31.5–35.7)
MCV RBC AUTO: 92.3 FL (ref 79–97)
PLATELET # BLD AUTO: 190 10*3/MM3 (ref 140–450)
PMV BLD AUTO: 9.6 FL (ref 6–12)
RBC # BLD AUTO: 4.29 10*6/MM3 (ref 3.77–5.28)
RH BLD: POSITIVE
T&S EXPIRATION DATE: NORMAL
WBC NRBC COR # BLD: 6.54 10*3/MM3 (ref 3.4–10.8)

## 2023-08-11 PROCEDURE — 86901 BLOOD TYPING SEROLOGIC RH(D): CPT

## 2023-08-11 PROCEDURE — 85027 COMPLETE CBC AUTOMATED: CPT

## 2023-08-11 PROCEDURE — 86850 RBC ANTIBODY SCREEN: CPT

## 2023-08-11 PROCEDURE — 36415 COLL VENOUS BLD VENIPUNCTURE: CPT

## 2023-08-11 PROCEDURE — 86900 BLOOD TYPING SEROLOGIC ABO: CPT

## 2023-08-11 RX ORDER — FAMOTIDINE 20 MG/1
20 TABLET, FILM COATED ORAL 2 TIMES DAILY
COMMUNITY

## 2023-08-11 RX ORDER — DOCUSATE SODIUM 100 MG/1
100 CAPSULE, LIQUID FILLED ORAL 2 TIMES DAILY
COMMUNITY

## 2023-08-11 RX ORDER — CALCIUM CARBONATE 500 MG/1
2 TABLET, CHEWABLE ORAL AS NEEDED
COMMUNITY

## 2023-08-11 NOTE — PAT
An arrival time for procedure was not provided during PAT visit. If patient had any questions or concerns about their arrival time, they were instructed to contact their surgeon/physician.  Additionally, if the patient referred to an arrival time that was acquired from their my chart account, patient was encouraged to verify that time with their surgeon/physician. Arrival times are NOT provided in Pre Admission Testing Department.     Patient denies any current skin issues.      Instructed patient to take two Tylenol extra strength (total of 1000 mg) the night before surgery.     Patient to apply Chlorhexadine wipes  to surgical area (as instructed) the night before procedure and the AM of procedure. Wipes provided.     Patient instructed to drink 20 ounces of Gatorade and it needs to be completed 1 hour (for Main OR patients) or 2 hours (scheduled  section & [facility] patients) before given arrival time for procedure (NO RED Gatorade)    Patient verbalized understanding.

## 2023-08-11 NOTE — DISCHARGE INSTRUCTIONS
What to know before your arrive:    -Do not eat, drink or chew gum beginning 8 hours before your scheduled arrival time to the hospital.  Except please drink 20 ounces of Gatorade and complete two hours before your given arrival time to the hospital.  If you drink too close to surgery time, your sugery may  be delayed or cancelled.  Please complete as instructed.     -Do not shave any part of your body including abdomen or pelvic are for two days before your procedure.  -If you are taking a scheduled medication (insulin, blood pressure medicine,antibiotics) please consult with your physician whether to take on the day of surgery.  -Remove all jewelry including rings, wedding bands, and piercing before coming to the hospital.  -Leave important valuables at home.  -Do not wear dark fingernail polish.  -Please take two Tylenol 500 mg tablets the evening before surgery.  -Bring the following with you to the hospital:    -Picture ID and insurance, Medicare or Medicaid cards    -Co-pay/deductible required by insurance (Cash, Check, Credit Card)    -Copy of living will or power  document (if applicable)    -CPAP mask and tubing, not machine (if applicable)    -Skin prep instructions sheet    What to know the day of procedure:    -Park in the [facility], take elevator for first floor, exit to the right and  proceed through the doors to outside, follow the covered sidewalk to the entrance of the [facility], follow the hallway and signs [facility], enter the [place] [facility] to your right BEFORE entering the 17[address] lobby.  Take the elevators to the 3rd floor ([address] [place] [facility]).  -Leave unnecessary items in your vehicle, including your suitcase.  Your support  person or a family member can get it for you after your procedure.   -Check in at the reception desk in the lobby of the 3rd floor ([address] [place] [facility]).   -One person may accompany you to the pre-op/recovery area.  Please have  other family members wait in the  waiting room.   -An anesthesiologist will meet with your prior to your procedure.   -After anesthesia has been initiated, one person may accompany you in the  operating room.   -No video cameras are permitted in the operating room; only still cameras,  Please.      What to expect while you are in recovery:     -One person may stay with you while you are in recovery.   -If the baby is stable, he/she may visit to initiate breastfeeding & [facility].      CHLORHEXIDINE GLUCONATE WIPES AND INSTRUCTIONS GIVEN TO PATIENT

## 2023-08-14 ENCOUNTER — HOSPITAL ENCOUNTER (INPATIENT)
Facility: HOSPITAL | Age: 43
LOS: 3 days | Discharge: HOME | End: 2023-08-17
Attending: OBSTETRICS & GYNECOLOGY | Admitting: OBSTETRICS & GYNECOLOGY
Payer: COMMERCIAL

## 2023-08-14 ENCOUNTER — ANESTHESIA EVENT (OUTPATIENT)
Dept: LABOR AND DELIVERY | Facility: HOSPITAL | Age: 43
End: 2023-08-14
Payer: COMMERCIAL

## 2023-08-14 ENCOUNTER — ANESTHESIA (OUTPATIENT)
Dept: LABOR AND DELIVERY | Facility: HOSPITAL | Age: 43
End: 2023-08-14
Payer: COMMERCIAL

## 2023-08-14 DIAGNOSIS — O44.43 LOW-LYING PLACENTA WITHOUT HEMORRHAGE, THIRD TRIMESTER: ICD-10-CM

## 2023-08-14 PROCEDURE — 25010000002 FENTANYL CITRATE (PF) 50 MCG/ML SOLUTION: Performed by: NURSE ANESTHETIST, CERTIFIED REGISTERED

## 2023-08-14 PROCEDURE — 25010000002 KETOROLAC TROMETHAMINE PER 15 MG: Performed by: OBSTETRICS & GYNECOLOGY

## 2023-08-14 PROCEDURE — 59510 CESAREAN DELIVERY: CPT | Performed by: OBSTETRICS & GYNECOLOGY

## 2023-08-14 PROCEDURE — 88305 TISSUE EXAM BY PATHOLOGIST: CPT | Performed by: OBSTETRICS & GYNECOLOGY

## 2023-08-14 PROCEDURE — 59025 FETAL NON-STRESS TEST: CPT

## 2023-08-14 PROCEDURE — 25010000002 MIDAZOLAM PER 1 MG: Performed by: NURSE ANESTHETIST, CERTIFIED REGISTERED

## 2023-08-14 PROCEDURE — 25010000002 ONDANSETRON PER 1 MG: Performed by: NURSE ANESTHETIST, CERTIFIED REGISTERED

## 2023-08-14 PROCEDURE — 25010000002 CEFAZOLIN IN DEXTROSE 2-4 GM/100ML-% SOLUTION: Performed by: OBSTETRICS & GYNECOLOGY

## 2023-08-14 PROCEDURE — 0 MORPHINE PER 10 MG: Performed by: NURSE ANESTHETIST, CERTIFIED REGISTERED

## 2023-08-14 PROCEDURE — 63710000001 DIPHENHYDRAMINE PER 50 MG: Performed by: OBSTETRICS & GYNECOLOGY

## 2023-08-14 PROCEDURE — 25010000002 OXYTOCIN PER 10 UNITS: Performed by: NURSE ANESTHETIST, CERTIFIED REGISTERED

## 2023-08-14 PROCEDURE — 58611 LIGATE OVIDUCT(S) ADD-ON: CPT | Performed by: OBSTETRICS & GYNECOLOGY

## 2023-08-14 PROCEDURE — 0UB70ZZ EXCISION OF BILATERAL FALLOPIAN TUBES, OPEN APPROACH: ICD-10-PCS | Performed by: OBSTETRICS & GYNECOLOGY

## 2023-08-14 RX ORDER — SODIUM CHLORIDE, SODIUM LACTATE, POTASSIUM CHLORIDE, CALCIUM CHLORIDE 600; 310; 30; 20 MG/100ML; MG/100ML; MG/100ML; MG/100ML
125 INJECTION, SOLUTION INTRAVENOUS CONTINUOUS
Status: DISCONTINUED | OUTPATIENT
Start: 2023-08-14 | End: 2023-08-17 | Stop reason: HOSPADM

## 2023-08-14 RX ORDER — KETOROLAC TROMETHAMINE 30 MG/ML
30 INJECTION, SOLUTION INTRAMUSCULAR; INTRAVENOUS ONCE
Status: COMPLETED | OUTPATIENT
Start: 2023-08-14 | End: 2023-08-14

## 2023-08-14 RX ORDER — METOCLOPRAMIDE 10 MG/1
10 TABLET ORAL ONCE AS NEEDED
Status: DISCONTINUED | OUTPATIENT
Start: 2023-08-14 | End: 2023-08-17 | Stop reason: HOSPADM

## 2023-08-14 RX ORDER — HYDROCORTISONE 25 MG/G
1 CREAM TOPICAL AS NEEDED
Status: DISCONTINUED | OUTPATIENT
Start: 2023-08-14 | End: 2023-08-17 | Stop reason: HOSPADM

## 2023-08-14 RX ORDER — MISOPROSTOL 200 UG/1
800 TABLET ORAL AS NEEDED
Status: DISCONTINUED | OUTPATIENT
Start: 2023-08-14 | End: 2023-08-14 | Stop reason: HOSPADM

## 2023-08-14 RX ORDER — IBUPROFEN 600 MG/1
600 TABLET ORAL EVERY 6 HOURS
Status: DISCONTINUED | OUTPATIENT
Start: 2023-08-15 | End: 2023-08-17 | Stop reason: HOSPADM

## 2023-08-14 RX ORDER — OXYCODONE HYDROCHLORIDE 10 MG/1
10 TABLET ORAL EVERY 4 HOURS PRN
Status: DISCONTINUED | OUTPATIENT
Start: 2023-08-14 | End: 2023-08-17 | Stop reason: HOSPADM

## 2023-08-14 RX ORDER — FAMOTIDINE 10 MG/ML
INJECTION, SOLUTION INTRAVENOUS AS NEEDED
Status: DISCONTINUED | OUTPATIENT
Start: 2023-08-14 | End: 2023-08-14 | Stop reason: SDUPTHER

## 2023-08-14 RX ORDER — MIDAZOLAM HYDROCHLORIDE 1 MG/ML
INJECTION INTRAMUSCULAR; INTRAVENOUS AS NEEDED
Status: DISCONTINUED | OUTPATIENT
Start: 2023-08-14 | End: 2023-08-15 | Stop reason: SURG

## 2023-08-14 RX ORDER — OXYTOCIN/0.9 % SODIUM CHLORIDE 30/500 ML
85 PLASTIC BAG, INJECTION (ML) INTRAVENOUS ONCE
Status: COMPLETED | OUTPATIENT
Start: 2023-08-14 | End: 2023-08-14

## 2023-08-14 RX ORDER — ONDANSETRON 2 MG/ML
INJECTION INTRAMUSCULAR; INTRAVENOUS AS NEEDED
Status: DISCONTINUED | OUTPATIENT
Start: 2023-08-14 | End: 2023-08-14 | Stop reason: SDUPTHER

## 2023-08-14 RX ORDER — DOCUSATE SODIUM 100 MG/1
100 CAPSULE, LIQUID FILLED ORAL 2 TIMES DAILY PRN
Status: DISCONTINUED | OUTPATIENT
Start: 2023-08-14 | End: 2023-08-16

## 2023-08-14 RX ORDER — ALUMINA, MAGNESIA, AND SIMETHICONE 2400; 2400; 240 MG/30ML; MG/30ML; MG/30ML
15 SUSPENSION ORAL EVERY 4 HOURS PRN
Status: DISCONTINUED | OUTPATIENT
Start: 2023-08-14 | End: 2023-08-17 | Stop reason: HOSPADM

## 2023-08-14 RX ORDER — CEFAZOLIN SODIUM 2 G/100ML
2000 INJECTION, SOLUTION INTRAVENOUS ONCE
Status: COMPLETED | OUTPATIENT
Start: 2023-08-14 | End: 2023-08-14

## 2023-08-14 RX ORDER — ACETAMINOPHEN 325 MG/1
650 TABLET ORAL EVERY 6 HOURS
Status: DISCONTINUED | OUTPATIENT
Start: 2023-08-15 | End: 2023-08-17 | Stop reason: HOSPADM

## 2023-08-14 RX ORDER — OXYCODONE HYDROCHLORIDE 5 MG/1
5 TABLET ORAL EVERY 4 HOURS PRN
Status: DISCONTINUED | OUTPATIENT
Start: 2023-08-14 | End: 2023-08-17 | Stop reason: HOSPADM

## 2023-08-14 RX ORDER — ONDANSETRON 2 MG/ML
4 INJECTION INTRAMUSCULAR; INTRAVENOUS EVERY 6 HOURS PRN
Status: DISCONTINUED | OUTPATIENT
Start: 2023-08-14 | End: 2023-08-17 | Stop reason: HOSPADM

## 2023-08-14 RX ORDER — DIPHENHYDRAMINE HCL 25 MG
25 CAPSULE ORAL EVERY 4 HOURS PRN
Status: DISCONTINUED | OUTPATIENT
Start: 2023-08-14 | End: 2023-08-17 | Stop reason: HOSPADM

## 2023-08-14 RX ORDER — PRENATAL VIT/IRON FUM/FOLIC AC 27MG-0.8MG
1 TABLET ORAL DAILY
Status: DISCONTINUED | OUTPATIENT
Start: 2023-08-14 | End: 2023-08-17 | Stop reason: HOSPADM

## 2023-08-14 RX ORDER — OXYTOCIN/0.9 % SODIUM CHLORIDE 30/500 ML
PLASTIC BAG, INJECTION (ML) INTRAVENOUS AS NEEDED
Status: DISCONTINUED | OUTPATIENT
Start: 2023-08-14 | End: 2023-08-15 | Stop reason: SURG

## 2023-08-14 RX ORDER — SIMETHICONE 80 MG
80 TABLET,CHEWABLE ORAL 4 TIMES DAILY PRN
Status: DISCONTINUED | OUTPATIENT
Start: 2023-08-14 | End: 2023-08-17 | Stop reason: HOSPADM

## 2023-08-14 RX ORDER — FENTANYL CITRATE 50 UG/ML
INJECTION, SOLUTION INTRAMUSCULAR; INTRAVENOUS AS NEEDED
Status: DISCONTINUED | OUTPATIENT
Start: 2023-08-14 | End: 2023-08-15 | Stop reason: SURG

## 2023-08-14 RX ORDER — OXYTOCIN/0.9 % SODIUM CHLORIDE 30/500 ML
650 PLASTIC BAG, INJECTION (ML) INTRAVENOUS ONCE
Status: DISCONTINUED | OUTPATIENT
Start: 2023-08-14 | End: 2023-08-14 | Stop reason: HOSPADM

## 2023-08-14 RX ORDER — GLYCOPYRROLATE 0.2 MG/ML
INJECTION INTRAMUSCULAR; INTRAVENOUS AS NEEDED
Status: DISCONTINUED | OUTPATIENT
Start: 2023-08-14 | End: 2023-08-15 | Stop reason: SURG

## 2023-08-14 RX ORDER — CARBOPROST TROMETHAMINE 250 UG/ML
250 INJECTION, SOLUTION INTRAMUSCULAR AS NEEDED
Status: DISCONTINUED | OUTPATIENT
Start: 2023-08-14 | End: 2023-08-14 | Stop reason: HOSPADM

## 2023-08-14 RX ORDER — PROMETHAZINE HYDROCHLORIDE 25 MG/1
25 TABLET ORAL ONCE AS NEEDED
Status: DISCONTINUED | OUTPATIENT
Start: 2023-08-14 | End: 2023-08-17 | Stop reason: HOSPADM

## 2023-08-14 RX ORDER — BUPIVACAINE HYDROCHLORIDE 7.5 MG/ML
INJECTION, SOLUTION INTRASPINAL AS NEEDED
Status: DISCONTINUED | OUTPATIENT
Start: 2023-08-14 | End: 2023-08-15 | Stop reason: SURG

## 2023-08-14 RX ORDER — PROMETHAZINE HYDROCHLORIDE 12.5 MG/1
12.5 SUPPOSITORY RECTAL ONCE AS NEEDED
Status: DISCONTINUED | OUTPATIENT
Start: 2023-08-14 | End: 2023-08-17 | Stop reason: HOSPADM

## 2023-08-14 RX ORDER — CALCIUM CARBONATE 500 MG/1
1 TABLET, CHEWABLE ORAL EVERY 4 HOURS PRN
Status: DISCONTINUED | OUTPATIENT
Start: 2023-08-14 | End: 2023-08-17 | Stop reason: HOSPADM

## 2023-08-14 RX ORDER — KETOROLAC TROMETHAMINE 15 MG/ML
15 INJECTION, SOLUTION INTRAMUSCULAR; INTRAVENOUS EVERY 6 HOURS
Status: COMPLETED | OUTPATIENT
Start: 2023-08-14 | End: 2023-08-15

## 2023-08-14 RX ORDER — CITRIC ACID/SODIUM CITRATE 334-500MG
30 SOLUTION, ORAL ORAL ONCE
Status: COMPLETED | OUTPATIENT
Start: 2023-08-14 | End: 2023-08-14

## 2023-08-14 RX ORDER — METHYLERGONOVINE MALEATE 0.2 MG/ML
200 INJECTION INTRAVENOUS ONCE AS NEEDED
Status: DISCONTINUED | OUTPATIENT
Start: 2023-08-14 | End: 2023-08-14 | Stop reason: HOSPADM

## 2023-08-14 RX ORDER — ACETAMINOPHEN 500 MG
1000 TABLET ORAL ONCE
Status: COMPLETED | OUTPATIENT
Start: 2023-08-14 | End: 2023-08-14

## 2023-08-14 RX ORDER — OXYTOCIN 10 [USP'U]/ML
INJECTION, SOLUTION INTRAMUSCULAR; INTRAVENOUS AS NEEDED
Status: DISCONTINUED | OUTPATIENT
Start: 2023-08-14 | End: 2023-08-15 | Stop reason: SURG

## 2023-08-14 RX ORDER — MORPHINE SULFATE 0.5 MG/ML
INJECTION, SOLUTION EPIDURAL; INTRATHECAL; INTRAVENOUS AS NEEDED
Status: DISCONTINUED | OUTPATIENT
Start: 2023-08-14 | End: 2023-08-15 | Stop reason: SURG

## 2023-08-14 RX ORDER — ACETAMINOPHEN 500 MG
1000 TABLET ORAL EVERY 6 HOURS
Status: COMPLETED | OUTPATIENT
Start: 2023-08-14 | End: 2023-08-15

## 2023-08-14 RX ORDER — ONDANSETRON 4 MG/1
4 TABLET, FILM COATED ORAL EVERY 6 HOURS PRN
Status: DISCONTINUED | OUTPATIENT
Start: 2023-08-14 | End: 2023-08-17 | Stop reason: HOSPADM

## 2023-08-14 RX ADMIN — BUPIVACAINE HYDROCHLORIDE IN DEXTROSE 1.6 ML: 7.5 INJECTION, SOLUTION SUBARACHNOID at 07:48

## 2023-08-14 RX ADMIN — ONDANSETRON 4 MG: 2 INJECTION INTRAMUSCULAR; INTRAVENOUS at 07:55

## 2023-08-14 RX ADMIN — Medication 500 ML: at 08:31

## 2023-08-14 RX ADMIN — MIDAZOLAM 1 MG: 1 INJECTION INTRAMUSCULAR; INTRAVENOUS at 07:41

## 2023-08-14 RX ADMIN — Medication 500 ML: at 08:05

## 2023-08-14 RX ADMIN — SODIUM CHLORIDE, POTASSIUM CHLORIDE, SODIUM LACTATE AND CALCIUM CHLORIDE: 600; 310; 30; 20 INJECTION, SOLUTION INTRAVENOUS at 08:01

## 2023-08-14 RX ADMIN — OXYCODONE HYDROCHLORIDE 5 MG: 5 TABLET ORAL at 14:46

## 2023-08-14 RX ADMIN — KETOROLAC TROMETHAMINE 30 MG: 30 INJECTION, SOLUTION INTRAMUSCULAR; INTRAVENOUS at 09:43

## 2023-08-14 RX ADMIN — KETOROLAC TROMETHAMINE 15 MG: 15 INJECTION, SOLUTION INTRAMUSCULAR; INTRAVENOUS at 22:16

## 2023-08-14 RX ADMIN — FENTANYL CITRATE 20 MCG: 50 INJECTION, SOLUTION INTRAMUSCULAR; INTRAVENOUS at 07:48

## 2023-08-14 RX ADMIN — DIPHENHYDRAMINE HYDROCHLORIDE 25 MG: 25 CAPSULE ORAL at 19:01

## 2023-08-14 RX ADMIN — CEFAZOLIN SODIUM 2000 MG: 2 INJECTION, SOLUTION INTRAVENOUS at 07:28

## 2023-08-14 RX ADMIN — KETOROLAC TROMETHAMINE 15 MG: 15 INJECTION, SOLUTION INTRAMUSCULAR; INTRAVENOUS at 15:44

## 2023-08-14 RX ADMIN — FAMOTIDINE 20 MG: 10 INJECTION INTRAVENOUS at 07:55

## 2023-08-14 RX ADMIN — SODIUM CHLORIDE, POTASSIUM CHLORIDE, SODIUM LACTATE AND CALCIUM CHLORIDE 1000 ML: 600; 310; 30; 20 INJECTION, SOLUTION INTRAVENOUS at 06:45

## 2023-08-14 RX ADMIN — MORPHINE SULFATE 0.1 MG: 0.5 INJECTION, SOLUTION EPIDURAL; INTRATHECAL; INTRAVENOUS at 07:48

## 2023-08-14 RX ADMIN — OXYCODONE HYDROCHLORIDE 5 MG: 5 TABLET ORAL at 22:16

## 2023-08-14 RX ADMIN — SODIUM CHLORIDE, POTASSIUM CHLORIDE, SODIUM LACTATE AND CALCIUM CHLORIDE 125 ML/HR: 600; 310; 30; 20 INJECTION, SOLUTION INTRAVENOUS at 07:29

## 2023-08-14 RX ADMIN — Medication 85 ML/HR: at 10:35

## 2023-08-14 RX ADMIN — SODIUM CITRATE AND CITRIC ACID MONOHYDRATE 30 ML: 500; 334 SOLUTION ORAL at 07:30

## 2023-08-14 RX ADMIN — ACETAMINOPHEN 1000 MG: 500 TABLET ORAL at 13:15

## 2023-08-14 RX ADMIN — GLYCOPYRROLATE 0.2 MG: 0.4 INJECTION INTRAMUSCULAR; INTRAVENOUS at 08:26

## 2023-08-14 RX ADMIN — ACETAMINOPHEN 1000 MG: 500 TABLET ORAL at 18:15

## 2023-08-14 RX ADMIN — OXYTOCIN 5 UNITS: 10 INJECTION INTRAVENOUS at 08:06

## 2023-08-14 RX ADMIN — ACETAMINOPHEN 1000 MG: 500 TABLET ORAL at 06:39

## 2023-08-14 NOTE — H&P
"History and Physical  [facility]    Chief Complaint   Patient presents with    Scheduled        Jeanne Bauman is a 43 y.o. year old  with an Estimated Date of Delivery: 23 currently at 39w0d presenting with irregular contractions, normal fetal movement, no leaking, no vaginal bleeding, and nausea.    Prenatal care has been with Dr. Petey Mccarty MD.  It has been significant for low lying placenta persistent in the third trimester. The patient desires primary  section with Bilateral Tubal Ligation.     No Additional Complaints Reported    The following portions of the patient's history were reviewed and updated as appropriate:vital signs, allergies, current medications, past medical history, past social history, past surgical history, and problem list.    Review of Systems  Pertinent items are noted in HPI.     Objective     Pulse 69   Temp 96.9 øF (36.1 øC) (Oral)   Resp 18   Ht 167.6 cm (66\")   Wt 67.6 kg (149 lb)   LMP 2022 (Exact Date)   Breastfeeding Yes   BMI 24.05 kg/mý     Physical Exam    General:  well developed; well nourished  no acute distress           Abdomen: soft, non-tender; no masses  no umbilical or inguinal hernias are present  no hepato-splenomegaly       FHT's: reactive and category 1   Cervix: deferrred   [place]: Contraction are irregular     Lab Review   Labs: CBC   Lab Results (last 24 hours)       ** No results found for the last 24 hours. **            Lab Results   Component Value Date    HGB 13.4 2023    HEPBSAG Negative 01/10/2023    ABO B 2023    RH Positive 2023    ABSCRN Negative 2023    EMZ6TZQ4 Non Reactive 01/10/2023    HEPCVIRUSABY <0.1 01/10/2023    URINECX Final report 01/10/2023         ASSESSMENT  IUP at 39w0d  Problem List Items Addressed This Visit          Gravid and     Low-lying placenta without hemorrhage, third trimester    Relevant Medications    lactated ringers bolus 1,000 " mL (Completed) (Start on 8/14/2023  7:30 AM)    lactated ringers infusion (Start on 8/14/2023  7:30 AM)    Sod Citrate-Citric Acid (BICITRA) solution 30 mL (Start on 8/14/2023  7:30 AM)    acetaminophen (TYLENOL) tablet 1,000 mg (Completed) (Start on 8/14/2023  7:30 AM)    ceFAZolin in dextrose (ANCEF) IVPB solution 2,000 mg (Start on 8/14/2023  7:30 AM)        PLAN  Primary LTCS with BTL         Heather Correia MD  8/14/202307:19 EDT

## 2023-08-14 NOTE — LACTATION NOTE
08/14/23 1544   Maternal Information   Date of Referral 08/14/23   Person Making Referral lactation consultant   Maternal Reason for Referral   (courtesy visit for new delivery. Hx: BF 1st child for 2yrs. 2nd child -1yr, 3rd child -4yrs. 4th child is 2yrs old & still nursing.)   Maternal Infant Feeding   Maternal Emotional State independent;receptive   Latch Assistance none needed  (Offered to help with latch. Pt encouraged to call for latch check with next feeding)   Support Person Involvement actively supporting mother  (A lot of family present at bedside)   Milk Expression/Equipment   Breast Pump Type double electric, personal  (Gave Spectra purmp from [employer] supply.)   Breast Pumping   Breast Pumping Interventions   (encouraged to pump for short or missed feedings.)

## 2023-08-14 NOTE — OP NOTE
Operative Note    Patient name: Jeanne Bauman  YOB: 1980   MRN: [id number]  Admission Date: 2023  Referring Provider: Petey Mccarty MD    ID: 43 y.o.  at 39w0d    Preoperative Diagnosis:   Pregnancy       Postoperative Diagnosis: Same as above.    Procedure(s): primarylow transverse  delivery     Procedures:    *  SECTION PRIMARY WITH SALPINGECTOMY    Procedure(s):   SECTION PRIMARY WITH SALPINGECTOMY    Surgeons: Surgeon(s) and Role:     * Petey Mccarty MD - Primary    Anesthesia: Spinal    Estimated Blood Loss:  720  mL    IV Fluids:1000ml    Preoperative antibiotic: cefazolin (Ancef)    Blood products:   Blood Administration Record (From admission, onward)      None            Pathology:   Order Name Source Comment Collection Info Order Time   TISSUE PATHOLOGY EXAM    2023  7:25 AM     Specimen source(s):   Fallopian Tubes, Bilateral          Release to patient   Routine Release            Drains: Carson catheter to gravity    Complications: None    Condition: Stable to recovery room    Infant:                Gender: female  infant    Weight: 3745 g (8 lb 4.1 oz)     Apgars:   @ 1 minute /       @ 5 minutes    Cord gases: Venous:  No components found for:  PHCVEN,  BECVEN      Arterial:  No components found for:  PHCART,  BECART          Operative Summary:   After obtaining informed consent the patient was taken to the operating room where adequate anesthesia was obtained.  Carson catheter was placed in the bladder preoperatively.  IV antibiotics were given preoperatively.       The abdomen was prepped and draped in the usual sterile fashion for  delivery.  After confirming adequate anesthesia a Pfannenstiel skin incision was made with the scalpel and carried through to the underlying layer of fascia.  The fascia was incised in the midline and the incision extended laterally with the Mehta scissors and with blunt dissection.        The upper aspect of the fascia was grasped with 2 Kocher clamps, elevated, and dissected off the underlying rectus muscles bluntly and with the Mehta scissors.  The Kocher clamps were removed and applied to the inferior aspect of the fascia.  The fascia was dissected off of the rectus muscles in the same fashion.  The peritoneum was entered bluntly.  The incision was stretched and the bladder blade and Rogers retractor inserted for visualization of the uterus.       The uterus was incised with the scalpel in a low transverse fashion.  The uterine incision was entered digitally and the incision extended bluntly in a cranial-caudal fashion.  Retractors were removed and membranes were ruptured.  The infant was delivered atraumatically from vertex presentation.  The umbilical cord was clamped and cut and the nose and mouth bulb suctioned.  The infant was handed off to waiting pediatric staff.       Cord blood gases were not collected.  Cord blood was collected.  The placenta was removed using cord traction and uterine massage.  The uterus was exteriorized and cleared of all clots and debris.  The uterine incision was repaired with #1 Chromic gut in a running locked fashion. A single-layer technique was used.  Additional hemostatic measures required: none.    The incision was inspected and excellent hemostasis was noted.  The tubes and ovaries were noted to be normal.        The right tube was grasped with a Edward clamp.  The entirety of the tube was ligated and excised with the Enseal device.   Excellent hemostasis was noted.  The left tube was then identified ligated and excised in a similar fashion.  Excellent hemostasis was noted.  The uterus was returned to the abdomen.  The gutters were cleared of all clots and debris.  Irrigation was used. The uterine incision and tubal sites were again inspected and found to be hemostatic.        The fascia was closed with 0 Vicryl in a running fashion.  The  subcutaneous space was reapproximated using 2-0 vicryl.      The skin was closed using 3-0 Monocryl on a Taye needle in a subcutaneous fashion.  Skin glue was then placed.  The patient was transferred to the recovery room in stable condition.    Petey Mccarty MD  08/14/23

## 2023-08-14 NOTE — PAYOR COMM NOTE
"Sara Bauman (43 y.o. Female)     [employer] Ref#[id number]    Delivery information.    From:Suzie Fleming LPN, Utilization Review  Phone #[phone]  Fax #[phone]        Date of Birth   1980    Social Security Number       Address   [address] [facility] [address]    Home Phone   [phone]    MRN   [id number]       Jehovah's witness   Hoahaoism    Marital Status                               Admission Date   8/14/23    Admission Type   Elective    Admitting Provider   Petey Mccarty MD    Attending Provider   Petey Mccarty MD    Department, Room/Bed   [facility] 4A, N421/1       Discharge Date       Discharge Disposition       Discharge Destination                                 Attending Provider: Petey Mccarty MD    Allergies: No Known Allergies    Isolation: None   Infection: None   Code Status: CPR    Ht: 167.6 cm (66\")   Wt: 67.6 kg (149 lb)    Admission Cmt: None   Principal Problem: Pregnancy [Z34.90]                   Active Insurance as of 8/14/2023       Primary Coverage       Payor Plan Insurance Group Employer/Plan Group    HUMANA HUMANA [id number]       Payor Plan Address Payor Plan Phone Number Payor Plan Fax Number Effective Dates    [address] [address] [phone]  4/1/2022 - None Entered    [facility] [address]         Subscriber Name Subscriber Birth Date Member ID       SARA BAUMAN 1980 [id number]                     Emergency Contacts        (Rel.) Home Phone Work Phone Mobile Phone    KOREY BAUMAN (Spouse) [phone] -- [phone]              Insurance Information                  HUMANA/HUMANA Phone: [phone]    Subscriber: Sara Bauman Subscriber#: [id number]    Group#: [id number] Precert#: [id number]             History & Physical        RitHeather Paz MD at 08/14/23 0718       Attestation signed by Petey Mccarty MD at 08/14/23 0729    I have reviewed this " "documentation and agree.                  History and Physical  [facility]    Chief Complaint   Patient presents with    Scheduled        Jeanne Bauman is a 43 y.o. year old  with an Estimated Date of Delivery: 23 currently at 39w0d presenting with irregular contractions, normal fetal movement, no leaking, no vaginal bleeding, and nausea.    Prenatal care has been with Dr. Petey Mccarty MD.  It has been significant for low lying placenta persistent in the third trimester. The patient desires primary  section with Bilateral Tubal Ligation.     No Additional Complaints Reported    The following portions of the patient's history were reviewed and updated as appropriate:vital signs, allergies, current medications, past medical history, past social history, past surgical history, and problem list.    Review of Systems  Pertinent items are noted in HPI.     Objective    Pulse 69   Temp 96.9 øF (36.1 øC) (Oral)   Resp 18   Ht 167.6 cm (66\")   Wt 67.6 kg (149 lb)   LMP 2022 (Exact Date)   Breastfeeding Yes   BMI 24.05 kg/mý     Physical Exam    General:  well developed; well nourished  no acute distress           Abdomen: soft, non-tender; no masses  no umbilical or inguinal hernias are present  no hepato-splenomegaly       FHT's: reactive and category 1   Cervix: deferrred   [place]: Contraction are irregular     Lab Review   Labs: CBC   Lab Results (last 24 hours)       ** No results found for the last 24 hours. **            Lab Results   Component Value Date    HGB 13.4 2023    HEPBSAG Negative 01/10/2023    ABO B 2023    RH Positive 2023    ABSCRN Negative 2023    [id number] Non Reactive 01/10/2023    HEPCVIRUSABY <0.1 01/10/2023    URINECX Final report 01/10/2023         ASSESSMENT  IUP at 39w0d  Problem List Items Addressed This Visit          Gravid and     Low-lying placenta without hemorrhage, third trimester    Relevant " Medications    lactated ringers bolus 1,000 mL (Completed) (Start on 8/14/2023  7:30 AM)    lactated ringers infusion (Start on 8/14/2023  7:30 AM)    Sod Citrate-Citric Acid (BICITRA) solution 30 mL (Start on 8/14/2023  7:30 AM)    acetaminophen (TYLENOL) tablet 1,000 mg (Completed) (Start on 8/14/2023  7:30 AM)    ceFAZolin in dextrose (ANCEF) IVPB solution 2,000 mg (Start on 8/14/2023  7:30 AM)        PLAN  Primary LTCS with BTL         Heather Correia MD  8/14/202307:19 EDT       Electronically signed by Petey Mccarty MD at 08/14/23 0729       Facility-Administered Medications as of 8/14/2023   Medication Dose Route Frequency Provider Last Rate Last Admin    [COMPLETED] acetaminophen (TYLENOL) tablet 1,000 mg  1,000 mg Oral Once Petey Mccarty MD   1,000 mg at 08/14/23 0639    acetaminophen (TYLENOL) tablet 1,000 mg  1,000 mg Oral Q6H Petey Mccarty MD   1,000 mg at 08/14/23 1315    Followed by    [START ON 8/15/2023] acetaminophen (TYLENOL) tablet 650 mg  650 mg Oral Q6H Petey Mccarty MD        aluminum-magnesium hydroxide-simethicone (MAALOX MAX) 400-400-40 MG/5ML suspension 15 mL  15 mL Oral Q4H PRN Petey Mccarty MD        Or    calcium carbonate (TUMS) chewable tablet 500 mg (200 mg elemental)  1 tablet Oral Q4H PRN Petey Mccarty MD        [COMPLETED] ceFAZolin in dextrose (ANCEF) IVPB solution 2,000 mg  2,000 mg Intravenous Once Petey Mccarty MD   2,000 mg at 08/14/23 0728    docusate sodium (COLACE) capsule 100 mg  100 mg Oral BID PRN Petey Mccarty MD        Hydrocortisone (Perianal) (ANUSOL-HC) 2.5 % rectal cream 1 application   1 application  Rectal PRN Petey Mccarty MD        ketorolac (TORADOL) injection 15 mg  15 mg Intravenous Q6H Petey Mccarty MD        Followed by    [START ON 8/15/2023] ibuprofen (ADVIL,MOTRIN) tablet 600 mg  600 mg Oral Q6H Petey Mccarty MD        [COMPLETED] ketorolac  (TORADOL) injection 30 mg  30 mg Intravenous Once Petey Mccarty MD   30 mg at 08/14/23 0943    [COMPLETED] lactated ringers bolus 1,000 mL  1,000 mL Intravenous Once Petey Mccarty MD 4,000 mL/hr at 08/14/23 0645 1,000 mL at 08/14/23 0645    lactated ringers infusion  125 mL/hr Intravenous Continuous Petey Mccarty  mL/hr at 08/14/23 0739 New Bag at 08/14/23 0801    lanolin topical 1 application   1 application  Topical Q1H PRN Petey Mccarty MD        metoclopramide (REGLAN) tablet 10 mg  10 mg Oral Once PRN Petey Mccarty MD        ondansetron (ZOFRAN) tablet 4 mg  4 mg Oral Q6H PRN Petey Mccarty MD        Or    ondansetron (ZOFRAN) injection 4 mg  4 mg Intravenous Q6H PRN Petey Mccarty MD        oxyCODONE (ROXICODONE) immediate release tablet 5 mg  5 mg Oral Q4H PRN Petey Mccarty MD   5 mg at 08/14/23 1446    Or    oxyCODONE (ROXICODONE) immediate release tablet 10 mg  10 mg Oral Q4H PRN Petey Mccarty MD        [COMPLETED] oxytocin (PITOCIN) 30 units in 0.9% sodium chloride 500 mL (premix)  85 mL/hr Intravenous Once Petey Mccarty MD 85 mL/hr at 08/14/23 1035 85 mL/hr at 08/14/23 1035    prenatal vitamin tablet 1 tablet  1 tablet Oral Daily Petey Mccarty MD        promethazine (PHENERGAN) tablet 25 mg  25 mg Oral Once PRN Petey Mccarty MD        Or    promethazine (PHENERGAN) suppository 12.5 mg  12.5 mg Rectal Once PRN Petey Mccarty MD        simethicone (MYLICON) chewable tablet 80 mg  80 mg Oral 4x Daily PRN Petey Mccarty MD        [COMPLETED] Sod Citrate-Citric Acid (BICITRA) solution 30 mL  30 mL Oral Once Petey Mccarty MD   30 mL at 08/14/23 0730     Orders (last 24 hrs)        Start     Ordered    08/15/23 1600  ibuprofen (ADVIL,MOTRIN) tablet 600 mg  Every 6 Hours        See Hyperspace for full Linked Orders Report.    08/14/23 1107    08/15/23 1300  acetaminophen  (TYLENOL) tablet 650 mg  Every 6 Hours        See Hyperspace for full Linked Orders Report.    08/14/23 1107    08/15/23 0600  Discontinue Indwelling Urinary Catheter in AM  Once         08/14/23 1107    08/15/23 0600  CBC & Differential  Timed         08/14/23 1107    08/15/23 0000  Remove Abdominal Dressing  Once         08/14/23 1107    08/14/23 1800  Ambulate Patient  2 Times Daily      Comments: After anesthesia wears off.    08/14/23 1107    08/14/23 1600  ketorolac (TORADOL) injection 15 mg  Every 6 Hours        See Hyperspace for full Linked Orders Report.    08/14/23 1107    08/14/23 1528  Diet: Regular/House Diet; Texture: Regular Texture (IDDSI 7); Fluid Consistency: Thin (IDDSI 0)  Diet Effective Now         08/14/23 1527    08/14/23 1300  acetaminophen (TYLENOL) tablet 1,000 mg  Every 6 Hours        See Hyperspace for full Linked Orders Report.    08/14/23 1107    08/14/23 1200  Incentive spirometry RT  Every Hour       08/14/23 1107    08/14/23 1200  prenatal vitamin tablet 1 tablet  Daily         08/14/23 1107    08/14/23 1108  Breast pump to bed  Once         08/14/23 1107    08/14/23 1108  If indicated -- Please administer RH Immunoglobulin based on results of cord blood evaluation and fetal screen lab tests, pharmacy to dispense  Continuous        Comments: See process instructions for reference range details.    08/14/23 1107    08/14/23 1108  Place Sequential Compression Device  Once         08/14/23 1107    08/14/23 1108  Maintain Sequential Compression Device  Continuous         08/14/23 1107    08/14/23 1107  oxyCODONE (ROXICODONE) immediate release tablet 5 mg  Every 4 Hours PRN        See Hyperspace for full Linked Orders Report.    08/14/23 1107    08/14/23 1107  oxyCODONE (ROXICODONE) immediate release tablet 10 mg  Every 4 Hours PRN        See Hyperspace for full Linked Orders Report.    08/14/23 1107    08/14/23 1107  docusate sodium (COLACE) capsule 100 mg  2 Times Daily PRN          08/14/23 1107    08/14/23 1107  simethicone (MYLICON) chewable tablet 80 mg  4 Times Daily PRN         08/14/23 1107    08/14/23 1107  ondansetron (ZOFRAN) tablet 4 mg  Every 6 Hours PRN        See Hyperspace for full Linked Orders Report.    08/14/23 1107    08/14/23 1107  ondansetron (ZOFRAN) injection 4 mg  Every 6 Hours PRN        See Hyperspace for full Linked Orders Report.    08/14/23 1107    08/14/23 1107  promethazine (PHENERGAN) tablet 25 mg  Once As Needed        See Hyperspace for full Linked Orders Report.    08/14/23 1107    08/14/23 1107  promethazine (PHENERGAN) suppository 12.5 mg  Once As Needed        See Hyperspace for full Linked Orders Report.    08/14/23 1107    08/14/23 1107  metoclopramide (REGLAN) tablet 10 mg  Once As Needed         08/14/23 1107    08/14/23 1107  lanolin topical 1 application   Every 1 Hour PRN         08/14/23 1107    08/14/23 1107  Hydrocortisone (Perianal) (ANUSOL-HC) 2.5 % rectal cream 1 application   As Needed         08/14/23 1107    08/14/23 1107  aluminum-magnesium hydroxide-simethicone (MAALOX MAX) 400-400-40 MG/5ML suspension 15 mL  Every 4 Hours PRN        See Hyperspace for full Linked Orders Report.    08/14/23 1107    08/14/23 1107  calcium carbonate (TUMS) chewable tablet 500 mg (200 mg elemental)  Every 4 Hours PRN        See Hyperspace for full Linked Orders Report.    08/14/23 1107    08/14/23 1107  Code Status and Medical Interventions:  Continuous         08/14/23 1107    08/14/23 1107  Vital Signs Per hospital policy  Per Hospital Policy         08/14/23 1107    08/14/23 1107  Notify Physician  Until Discontinued         08/14/23 1107    08/14/23 1107  Patient May Shower  Once        Comments: After anesthesia wears off and with assistance    08/14/23 1107    08/14/23 1107  Advance Diet As Tolerated -Regular  Until Discontinued         08/14/23 1107    08/14/23 1107  I/O  Every Shift       08/14/23 1107    08/14/23 1107  Fundal and Lochia Check  Per  "Hospital Policy        Comments: Q 30 min x 2, Q 1 hr x 4, Q 4 hrs x 24 hrs, then Q shift.    08/14/23 1107    08/14/23 1107  Weigh Patient  Once         08/14/23 1107    08/14/23 1107  Continue Indwelling Urinary Catheter Already in Place  Once         08/14/23 1107    08/14/23 1107  Notify Provider if Bladder Distention Continues  Until Discontinued         08/14/23 1107    08/14/23 1107  Turn Cough Deep Breathe  Once         08/14/23 1107    08/14/23 1107  Encourage early intake of PO fluids  Continuous         08/14/23 1107    08/14/23 1107  Ambulate Patient 3-5 times per day (with or without Carson)  Every Shift       08/14/23 1107    08/14/23 1107  Apply Abdominal Binding Until Discontinued  Until Discontinued         08/14/23 1107    08/14/23 1107  \"If patient tolerates food and liquids after completion of second bag of Pitocin, saline lock IV and discontinue IV fluid infusions.  Once         08/14/23 1107    08/14/23 1030  oxytocin (PITOCIN) 30 units in 0.9% sodium chloride 500 mL (premix)  Once,   Status:  Discontinued        See Hyperspace for full Linked Orders Report.    08/14/23 0940    08/14/23 1030  oxytocin (PITOCIN) 30 units in 0.9% sodium chloride 500 mL (premix)  Once        See Hyperspace for full Linked Orders Report.    08/14/23 0940    08/14/23 1030  ketorolac (TORADOL) injection 30 mg  Once         08/14/23 0940    08/14/23 0941  Notify Provider (Specified)  Until Discontinued         08/14/23 0940    08/14/23 0941  Vital Signs Per Hospital Policy  Per Hospital Policy         08/14/23 0940    08/14/23 0941  Strict Bed Rest  Until Discontinued         08/14/23 0940    08/14/23 0941  Fundal & Lochia Check  Per Order Details        Comments: Every 15 Minutes x4, Then Every 30 Minutes x2, Then Every Shift    08/14/23 0940    08/14/23 0941  Diet: Regular/House Diet; Texture: Regular Texture (IDDSI 7); Fluid Consistency: Thin (IDDSI 0)  Diet Effective Now,   Status:  Canceled         08/14/23 0940 "    08/14/23 0940  Fundal & Lochia Check  Every Shift       08/14/23 0940    08/14/23 0940  methylergonovine (METHERGINE) injection 200 mcg  Once As Needed,   Status:  Discontinued         08/14/23 0940    08/14/23 0940  carboprost (HEMABATE) injection 250 mcg  As Needed,   Status:  Discontinued         08/14/23 0940    08/14/23 0940  miSOPROStol (CYTOTEC) tablet 800 mcg  As Needed,   Status:  Discontinued         08/14/23 0940    08/14/23 0730  lactated ringers bolus 1,000 mL  Once         08/14/23 0635    08/14/23 0730  lactated ringers infusion  Continuous         08/14/23 0635    08/14/23 0730  Sod Citrate-Citric Acid (BICITRA) solution 30 mL  Once         08/14/23 0635    08/14/23 0730  acetaminophen (TYLENOL) tablet 1,000 mg  Once         08/14/23 0635    08/14/23 0730  ceFAZolin in dextrose (ANCEF) IVPB solution 2,000 mg  Once         08/14/23 0635    08/14/23 0726  Tissue Pathology Exam  Once         08/14/23 0725    08/14/23 0636  Admit To Obstetrics Inpatient  Once         08/14/23 0635    08/14/23 0636  Vital Signs Per Hospital Policy  Per Hospital Policy         08/14/23 0635    08/14/23 0636  Continuous Fetal Monitoring With NST on Admission and Prior to Initiation of Oxytocin.  Per Order Details        Comments: Continuous Fetal Monitoring With NST on Admission    08/14/23 0635    08/14/23 0636  External Uterine Contraction Monitoring  Per Hospital Policy         08/14/23 0635    08/14/23 0636  Notify Provider (Specified)  Until Discontinued         08/14/23 0635    08/14/23 0636  Notify Provider of Tachysystole (Per Hospital Algorithm)  Until Discontinued         08/14/23 0635    08/14/23 0636  Notify Provider if Membranes Ruptured, Bleeding Greater Than 1 Pad Per Hour, Fetal Heart Tone Abnormality or Severe Pain  Until Discontinued         08/14/23 0635    08/14/23 0636  Initiate Group Beta Strep (GBS) Prophylaxis Protocol, If Criteria Met  Continuous        Comments: NO TREATMENT RECOMMENDED IF: 1)  Maternal GBS Status Known Negative 2) Scheduled  Birth With Intact Membranes, Not in Labor 3) Maternal GBS Status Unknown, No Risk Factors  TREAT WITH ANTIBIOTICS IF:  1) Maternal GBS Status Known Positive 2) Maternal GBS Status Unknown With Risk Factors: a)  Previous Infant Affected By GBS Infection b) GBS Urinary Tract Infection (UTI) or Bacteriuria During Pregnancy c) Unexplained Maternal Fever (100.4F (38C) or Greater) During Labor d)  Prolonged Rupture of Membranes (18 or More Hours) e) Gestational Age Less Than 37 Weeks    23 0635    23  Insert Indwelling Urinary Catheter  Once        See [facility] for full Linked Orders Report.    23 0635    23  Assess Need for Indwelling Urinary Catheter - Follow Removal Protocol  Continuous        Comments: Indwelling Urinary Catheter Removal Criteria  Discontinue Indwelling Urinary Catheter Unless One of the Following is Present:  Urinary Retention or Obstruction  Chronic Urinary Catheter Use  End of Life  Critical Illness with Strict I/O   Tract or Abdominal Surgery  Stage 3/4 Sacral / Perineal Wound  Required Activity Restriction: Trauma  Required Activity Restriction: Spine Surgery  If Patient is Being Followed by Urology Contact Them PRIOR to Removal  Do Not Remove Indwelling Urinary Catheter Order is Present with a CLINICAL REASON to Maintain the Catheter. Provider is Required to Include a Clinical Reason to Maintain a Urinary Catheter    Patient Admitted With Indwelling Urinary Catheter (Not Placed at [facility])  Assess for Continued Need & Document Medical Necessity  If Infection is Suspected, Contact the Provider       See Hyperspace for full Linked Orders Report.    23 0635    23  Abdominal Prep With Clippers  Once         2335    23  Chlorhexadine Skin Prep Unless Otherwise Indicated  Once         23 0635    23  SCD (Sequential Compression Devices)  Once          08/14/23 0635    08/14/23 0636  POC Glucose Once  Once         08/14/23 0635    08/14/23 0636  Document Gatorade Consumption Prior to Admission (Yes or No)  Once         08/14/23 0635    08/14/23 0636  NPO Diet NPO Type: Ice Chips  Diet Effective Now,   Status:  Canceled         08/14/23 0635    08/14/23 0636  Inpatient Anesthesiology Consult  Once        Specialty:  Anesthesiology  Provider:  (Not yet assigned)    08/14/23 0635    08/14/23 0636  Code Status and Medical Interventions:  Continuous,   Status:  Canceled         08/14/23 0635    08/14/23 0635  Urinary Catheter Care  Every Shift      See [facility] for full Linked Orders Report.    08/14/23 0635    Unscheduled  Up with Assistance  As Needed       08/14/23 1107    Unscheduled  Bladder Scan if Patient Unable to Void 4-6 Hours After Catheter Removal  As Needed         08/14/23 1107    Unscheduled  Straight Cath Every 4-6 Hours As Needed If Patient is Unable to Void After 4-6 Hours, Bladder Scan Volume is Greater Than 500mL & Patient Has Symptoms of Bladder Discomfort / Distention  As Needed       08/14/23 1107    Unscheduled  Schedule / Prompt Voiding For Patients With Urinary Incontinence  As Needed       08/14/23 1107    Unscheduled  Abdominal Wound Care  As Needed      Comments: Postop day 1. Remove dressing and leave incision open to air.    08/14/23 1107    Unscheduled  Chewing Gum  As Needed       08/14/23 1107    Unscheduled  Warm compress  As Needed       08/14/23 1107    Unscheduled  Apply ace wrap, tight bra, or binder  As Needed       08/14/23 1107    Unscheduled  Apply ice packs  As Needed       08/14/23 1107    Signed and Held  Urinary Catheter Care  Every Shift       Signed and Held                     Operative/Procedure Notes (last 24 hours)        Petey Mccarty MD at 08/14/23 0800          Operative Note    Patient name: Jeanne Bauman  YOB: 1980   MRN: [id number]  Admission Date: 8/14/2023  Referring Provider:  Petey Mccarty MD    ID: 43 y.o.  at 39w0d    Preoperative Diagnosis:   Pregnancy       Postoperative Diagnosis: Same as above.    Procedure(s): primarylow transverse  delivery     Procedures:    *  SECTION PRIMARY WITH SALPINGECTOMY    Procedure(s):   SECTION PRIMARY WITH SALPINGECTOMY    Surgeons: Surgeon(s) and Role:     * Petey Mccarty MD - Primary    Anesthesia: Spinal    Estimated Blood Loss:  720  mL    IV Fluids:1000ml    Preoperative antibiotic: cefazolin (Ancef)    Blood products:   Blood Administration Record (From admission, onward)      None            Pathology:   Order Name Source Comment Collection Info Order Time   TISSUE PATHOLOGY EXAM    2023  7:25 AM     Specimen source(s):   Fallopian Tubes, Bilateral          Release to patient   Routine Release            Drains: Carson catheter to gravity    Complications: None    Condition: Stable to recovery room    Infant:                Gender: female  infant    Weight: 3745 g (8 lb 4.1 oz)     Apgars:   @ 1 minute /       @ 5 minutes    Cord gases: Venous:  No components found for:  PHCVEN,  BECVEN      Arterial:  No components found for:  PHCART,  BECART          Operative Summary:   After obtaining informed consent the patient was taken to the operating room where adequate anesthesia was obtained.  Carson catheter was placed in the bladder preoperatively.  IV antibiotics were given preoperatively.       The abdomen was prepped and draped in the usual sterile fashion for  delivery.  After confirming adequate anesthesia a Pfannenstiel skin incision was made with the scalpel and carried through to the underlying layer of fascia.  The fascia was incised in the midline and the incision extended laterally with the Mehta scissors and with blunt dissection.       The upper aspect of the fascia was grasped with 2 Kocher clamps, elevated, and dissected off the underlying rectus muscles bluntly and with the  Mehta scissors.  The Kocher clamps were removed and applied to the inferior aspect of the fascia.  The fascia was dissected off of the rectus muscles in the same fashion.  The peritoneum was entered bluntly.  The incision was stretched and the bladder blade and Rogers retractor inserted for visualization of the uterus.       The uterus was incised with the scalpel in a low transverse fashion.  The uterine incision was entered digitally and the incision extended bluntly in a cranial-caudal fashion.  Retractors were removed and membranes were ruptured.  The infant was delivered atraumatically from vertex presentation.  The umbilical cord was clamped and cut and the nose and mouth bulb suctioned.  The infant was handed off to waiting pediatric staff.       Cord blood gases were not collected.  Cord blood was collected.  The placenta was removed using cord traction and uterine massage.  The uterus was exteriorized and cleared of all clots and debris.  The uterine incision was repaired with #1 Chromic gut in a running locked fashion. A single-layer technique was used.  Additional hemostatic measures required: none.    The incision was inspected and excellent hemostasis was noted.  The tubes and ovaries were noted to be normal.        The right tube was grasped with a [place] clamp.  The entirety of the tube was ligated and excised with the Enseal device.   Excellent hemostasis was noted.  The left tube was then identified ligated and excised in a similar fashion.  Excellent hemostasis was noted.  The uterus was returned to the abdomen.  The gutters were cleared of all clots and debris.  Irrigation was used. The uterine incision and tubal sites were again inspected and found to be hemostatic.        The fascia was closed with 0 Vicryl in a running fashion.  The subcutaneous space was reapproximated using 2-0 vicryl.      The skin was closed using 3-0 Monocryl on a Taye needle in a subcutaneous fashion.  Skin glue was  then placed.  The patient was transferred to the recovery room in stable condition.    Petey Mccarty MD  08/14/23      Electronically signed by Petey Mccarty MD at 08/14/23 0833       Physician Progress Notes (last 24 hours)  Notes from 08/13/23 1543 through 08/14/23 1543   No notes of this type exist for this encounter.

## 2023-08-14 NOTE — ANESTHESIA PREPROCEDURE EVALUATION
Anesthesia Evaluation     Patient summary reviewed and Nursing notes reviewed   history of anesthetic complications (PL[facility]):   NPO Solid Status: > 8 hours  NPO Liquid Status: > 8 hours           Airway   Dental      Pulmonary - negative pulmonary ROS   Cardiovascular - negative cardio ROS        Neuro/Psych- negative ROS  (+) headaches, psychiatric history Anxiety  GI/Hepatic/Renal/Endo - negative ROS     Musculoskeletal (-) negative ROS    Abdominal    Substance History - negative use     OB/GYN negative ob/gyn ROS   (+) Pregnant        Other                      Anesthesia Plan    ASA 2     spinal and ITN       Anesthetic plan, risks, benefits, and alternatives have been provided, discussed and informed consent has been obtained with: patient.    CODE STATUS:    Level Of Support Discussed With: Patient  Code Status (Patient has no pulse and is not breathing): CPR (Attempt to Resuscitate)  Medical Interventions (Patient has pulse or is breathing): Full Support  Release to patient: Routine Release

## 2023-08-15 LAB
BASOPHILS # BLD AUTO: 0.04 10*3/MM3 (ref 0–0.2)
BASOPHILS NFR BLD AUTO: 0.5 % (ref 0–1.5)
CYTO UR: NORMAL
DEPRECATED RDW RBC AUTO: 42.4 FL (ref 37–54)
EOSINOPHIL # BLD AUTO: 0.12 10*3/MM3 (ref 0–0.4)
EOSINOPHIL NFR BLD AUTO: 1.5 % (ref 0.3–6.2)
ERYTHROCYTE [DISTWIDTH] IN BLOOD BY AUTOMATED COUNT: 12.8 % (ref 12.3–15.4)
HCT VFR BLD AUTO: 27.6 % (ref 34–46.6)
HGB BLD-MCNC: 9.2 G/DL (ref 12–15.9)
IMM GRANULOCYTES # BLD AUTO: 0.06 10*3/MM3 (ref 0–0.05)
IMM GRANULOCYTES NFR BLD AUTO: 0.7 % (ref 0–0.5)
LAB AP CASE REPORT: NORMAL
LAB AP CLINICAL INFORMATION: NORMAL
LYMPHOCYTES # BLD AUTO: 1.09 10*3/MM3 (ref 0.7–3.1)
LYMPHOCYTES NFR BLD AUTO: 13.2 % (ref 19.6–45.3)
MCH RBC QN AUTO: 30.9 PG (ref 26.6–33)
MCHC RBC AUTO-ENTMCNC: 33.3 G/DL (ref 31.5–35.7)
MCV RBC AUTO: 92.6 FL (ref 79–97)
MONOCYTES # BLD AUTO: 0.55 10*3/MM3 (ref 0.1–0.9)
MONOCYTES NFR BLD AUTO: 6.7 % (ref 5–12)
NEUTROPHILS NFR BLD AUTO: 6.37 10*3/MM3 (ref 1.7–7)
NEUTROPHILS NFR BLD AUTO: 77.4 % (ref 42.7–76)
NRBC BLD AUTO-RTO: 0 /100 WBC (ref 0–0.2)
PATH REPORT.FINAL DX SPEC: NORMAL
PATH REPORT.GROSS SPEC: NORMAL
PLATELET # BLD AUTO: 149 10*3/MM3 (ref 140–450)
PMV BLD AUTO: 10.1 FL (ref 6–12)
RBC # BLD AUTO: 2.98 10*6/MM3 (ref 3.77–5.28)
WBC NRBC COR # BLD: 8.23 10*3/MM3 (ref 3.4–10.8)

## 2023-08-15 PROCEDURE — 85025 COMPLETE CBC W/AUTO DIFF WBC: CPT | Performed by: OBSTETRICS & GYNECOLOGY

## 2023-08-15 PROCEDURE — 25010000002 KETOROLAC TROMETHAMINE PER 15 MG: Performed by: OBSTETRICS & GYNECOLOGY

## 2023-08-15 PROCEDURE — 0503F POSTPARTUM CARE VISIT: CPT

## 2023-08-15 RX ADMIN — IBUPROFEN 600 MG: 600 TABLET ORAL at 22:55

## 2023-08-15 RX ADMIN — ACETAMINOPHEN 650 MG: 325 TABLET ORAL at 13:00

## 2023-08-15 RX ADMIN — IBUPROFEN 600 MG: 600 TABLET ORAL at 16:39

## 2023-08-15 RX ADMIN — DOCUSATE SODIUM 100 MG: 100 CAPSULE, LIQUID FILLED ORAL at 08:16

## 2023-08-15 RX ADMIN — KETOROLAC TROMETHAMINE 15 MG: 15 INJECTION, SOLUTION INTRAMUSCULAR; INTRAVENOUS at 04:59

## 2023-08-15 RX ADMIN — DOCUSATE SODIUM 100 MG: 100 CAPSULE, LIQUID FILLED ORAL at 20:33

## 2023-08-15 RX ADMIN — OXYCODONE HYDROCHLORIDE 10 MG: 10 TABLET ORAL at 20:33

## 2023-08-15 RX ADMIN — DOCUSATE SODIUM 100 MG: 100 CAPSULE, LIQUID FILLED ORAL at 00:21

## 2023-08-15 RX ADMIN — OXYCODONE HYDROCHLORIDE 5 MG: 5 TABLET ORAL at 13:03

## 2023-08-15 RX ADMIN — OXYCODONE HYDROCHLORIDE 5 MG: 5 TABLET ORAL at 16:39

## 2023-08-15 RX ADMIN — OXYCODONE HYDROCHLORIDE 10 MG: 10 TABLET ORAL at 06:29

## 2023-08-15 RX ADMIN — SIMETHICONE 80 MG: 80 TABLET, CHEWABLE ORAL at 08:16

## 2023-08-15 RX ADMIN — ACETAMINOPHEN 1000 MG: 500 TABLET ORAL at 00:21

## 2023-08-15 RX ADMIN — ACETAMINOPHEN 650 MG: 325 TABLET ORAL at 18:20

## 2023-08-15 RX ADMIN — ACETAMINOPHEN 1000 MG: 500 TABLET ORAL at 06:29

## 2023-08-15 RX ADMIN — KETOROLAC TROMETHAMINE 15 MG: 15 INJECTION, SOLUTION INTRAMUSCULAR; INTRAVENOUS at 10:07

## 2023-08-15 RX ADMIN — PRENATAL VITAMINS-IRON FUMARATE 27 MG IRON-FOLIC ACID 0.8 MG TABLET 1 TABLET: at 08:16

## 2023-08-15 NOTE — PROGRESS NOTES
Postpartum Progress Note    Patient name: Jeanne Bauman  YOB: 1980   MRN: [id number]  Referring Provider: Petey Mccarty MD  Admission Date: 2023  Date of Service: 8/15/2023    ID: 43 y.o.     Diagnosis:   S/p  delivery 1 Day Post-Op     Pregnancy       Subjective:      No complaints.  Moderate lochia.  Ambulating, voiding, tolerating diet.No flatus yet. Endorses mild urinary hesitancy.  Pain well controlled.  The patient is currently breastfeeding.   This baby is a female. Doing well. She had hypoglycemia after delivery yesterday, but improving.    Objective:      Vital signs:  Vital Signs Range for the last 24 hours  Temperature: Temp:  [97.6 øF (36.4 øC)-98.7 øF (37.1 øC)] 97.7 øF (36.5 øC)   Temp Source: Temp src: Oral   BP: BP: ()/(46-71) 92/50   Pulse: Heart Rate:  [] 60   Respirations: Resp:  [16-18] 16   Weight: 67.6 kg (149 lb)     General: Alert & oriented x4, in no apparent distress  Abdomen: soft, nontender  Uterus: firm, nontender, fundus below umbilicus  Incision: clean, dry, intact ( suture/skin glue)  Extremities: nontender; no edema      Labs:  Lab Results   Component Value Date    WBC 6.54 2023    HGB 13.4 2023    HCT 39.6 2023    MCV 92.3 2023     2023     Results from last 7 days   Lab Units 23  1044   ABO TYPING  B   RH TYPING  Positive     External Prenatal Results       Pregnancy Outside Results - Transcribed From Office Records - See Scanned Records For Details       Test Value Date Time    ABO  B  23 1044    Rh  Positive  23 1044    Antibody Screen  Negative  23 1044       Negative  23 1113       Negative  01/10/23 1117    Varicella IgG       Rubella  1.45 index 01/10/23 1117    Hgb  13.4 g/dL 23 1044       11.6 g/dL 23 1113       13.2 g/dL 01/10/23 1117    Hct  39.6 % 23 1044       33.6 % 23 1113       39.4 % 01/10/23 1117    Glucose Fasting  GTT  74 mg/dL 21 1416    Glucose Tolerance Test 1 hour  111 mg/dL 21 1416    Glucose Tolerance Test 3 hour  58 mg/dL 21 1416    Gonorrhea (discrete)  Negative  01/10/23 1117    Chlamydia (discrete)  Negative  01/10/23 1117    RPR  Non Reactive  01/10/23 1117    VDRL       Syphilis Antibody       HBsAg  Negative  01/10/23 1117    Herpes Simplex Virus PCR       Herpes Simplex VIrus Culture       HIV  Non Reactive  01/10/23 1117    Hep C RNA Quant PCR       Hep C Antibody  <0.1 s/co ratio 01/10/23 111    AFP  54.3 ng/mL 23 0934    Group B Strep  No Group B Streptococcus isolated  23 1907    GBS Susceptibility to Clindamycin       GBS Susceptibility to Erythromycin       Fetal Fibronectin       Genetic Testing, Maternal Blood                 Drug Screening       Test Value Date Time    Urine Drug Screen       Amphetamine Screen  Negative ng/mL 01/10/23 1117    Barbiturate Screen  Negative ng/mL 01/10/23 111    Benzodiazepine Screen  Negative ng/mL 01/10/23 1117    Methadone Screen  Negative ng/mL 01/10/23 1117    Phencyclidine Screen  Negative ng/mL 01/10/23 1117    Opiates Screen       THC Screen       Cocaine Screen       Propoxyphene Screen  Negative ng/mL 01/10/23 111    Buprenorphine Screen       Methamphetamine Screen       Oxycodone Screen       Tricyclic Antidepressants Screen                 Legend    ^: Historical                            Assessment/Plan:      1 Day Post-Op s/p Procedure(s):   SECTION PRIMARY WITH SALPINGECTOMY  1. S/p  delivery: Continue postoperative care.  Doing well.  2. Infant feeding: Supportive care.  The patient is currently breastfeeding.  3. Contraception: bilateral salpingectomy      Heather Richardson MD, PGY-1

## 2023-08-15 NOTE — PROGRESS NOTES
Postpartum Progress Note    Patient name: Jeanne Bauman  YOB: 1980   MRN: [id number]  Referring Provider: Petey Mccarty MD  Admission Date: 2023  Date of Service: 8/15/2023    ID: 43 y.o.     Diagnosis:   S/p  delivery 1 Day Post-Op     Pregnancy       Subjective:      No complaints.  Moderate lochia.  Ambulating, voiding, tolerating diet.  Pain well controlled.  The patient is currently breastfeeding.   This baby is a female.    Objective:      Vital signs:  Vital Signs Range for the last 24 hours  Temperature: Temp:  [97.6 øF (36.4 øC)-98.7 øF (37.1 øC)] 97.6 øF (36.4 øC)   Temp Source: Temp src: Oral   BP: BP: ()/(46-65) 109/53   Pulse: Heart Rate:  [56-75] 70   Respirations: Resp:  [16-18] 16   Weight: 67.6 kg (149 lb)     General: Alert & oriented x4, in no apparent distress  Abdomen: soft, nontender  Uterus: firm, nontender  Incision: clean, dry, intact, dressing clean, sutures  Extremities: nontender; no edema      Labs:  Lab Results   Component Value Date    WBC 8.23 08/15/2023    HGB 9.2 (L) 08/15/2023    HCT 27.6 (L) 08/15/2023    MCV 92.6 08/15/2023     08/15/2023     Results from last 7 days   Lab Units 23  1044   ABO TYPING  B   RH TYPING  Positive     External Prenatal Results       Pregnancy Outside Results - Transcribed From Office Records - See Scanned Records For Details       Test Value Date Time    ABO  B  23 1044    Rh  Positive  23 1044    Antibody Screen  Negative  23 1044       Negative  23 1113       Negative  01/10/23 1117    Varicella IgG       Rubella  1.45 index 01/10/23 1117    Hgb  9.2 g/dL 08/15/23 0632       13.4 g/dL 23 1044       11.6 g/dL 23 1113       13.2 g/dL 01/10/23 1117    Hct  27.6 % 08/15/23 0632       39.6 % 23 1044       33.6 % 23 1113       39.4 % 01/10/23 1117    Glucose Fasting GTT  74 mg/dL 21 1416    Glucose Tolerance Test 1 hour  111 mg/dL  21 1416    Glucose Tolerance Test 3 hour  58 mg/dL 21 1416    Gonorrhea (discrete)  Negative  01/10/23 1117    Chlamydia (discrete)  Negative  01/10/23 111    RPR  Non Reactive  01/10/23 1117    VDRL       Syphilis Antibody       HBsAg  Negative  01/10/23 1117    Herpes Simplex Virus PCR       Herpes Simplex VIrus Culture       HIV  Non Reactive  01/10/23 1117    Hep C RNA Quant PCR       Hep C Antibody  <0.1 s/co ratio 01/10/23 111    AFP  54.3 ng/mL 23 0934    Group B Strep  No Group B Streptococcus isolated  23 1907    GBS Susceptibility to Clindamycin       GBS Susceptibility to Erythromycin       Fetal Fibronectin       Genetic Testing, Maternal Blood                 Drug Screening       Test Value Date Time    Urine Drug Screen       Amphetamine Screen  Negative ng/mL 01/10/23 1117    Barbiturate Screen  Negative ng/mL 01/10/23 1117    Benzodiazepine Screen  Negative ng/mL 01/10/23 111    Methadone Screen  Negative ng/mL 01/10/23 1117    Phencyclidine Screen  Negative ng/mL 01/10/23 1117    Opiates Screen       THC Screen       Cocaine Screen       Propoxyphene Screen  Negative ng/mL 01/10/23 1117    Buprenorphine Screen       Methamphetamine Screen       Oxycodone Screen       Tricyclic Antidepressants Screen                 Legend    ^: Historical                            Assessment/Plan:      1 Day Post-Op s/p Procedure(s):   SECTION PRIMARY WITH SALPINGECTOMY  1. S/p  delivery: Continue postoperative care.  Doing well.  2. Infant feeding: Supportive care.  The patient is currently breastfeeding.  3. Asymptomatic stable postoperative anemia.

## 2023-08-15 NOTE — PLAN OF CARE
Goal Outcome Evaluation:  Plan of Care Reviewed With: patient        Progress: improving  Outcome Evaluation: VSS. lochia light. Voiding. Ambulating. Pain well controlled with ordered medications and PRNs. Breastfeeding infant on demand.

## 2023-08-15 NOTE — ANESTHESIA POSTPROCEDURE EVALUATION
Patient: Jeanne Bauman    Procedure Summary       Date: 23 Room / Location: [facility] LABOR DELIVERY   [facility]IV[facility]    Anesthesia Start: 739 Anesthesia Stop:     Procedure:  SECTION PRIMARY WITH SALPINGECTOMY (Bilateral) Diagnosis:     Surgeons: Petey Mccarty MD Provider: Brenna Salas DO    Anesthesia Type: spinal, ITN ASA Status: 2            Anesthesia Type: spinal, ITN    Vitals  Vitals Value Taken Time   BP 94/50 08/15/23 1125   Temp 98 øF (36.7 øC) 08/15/23 1125   Pulse 67 08/15/23 1125   Resp 18 08/15/23 1125   SpO2 100 % 23 1040           Post Anesthesia Care and Evaluation    Patient location during evaluation: bedside  Patient participation: complete - patient participated  Level of consciousness: awake and alert  Pain management: adequate    Airway patency: patent  Anesthetic complications: No anesthetic complications    Cardiovascular status: acceptable  Respiratory status: acceptable  Hydration status: acceptable  Post Neuraxial Block status: Motor and sensory function returned to baseline and No signs or symptoms of PDPH

## 2023-08-16 PROCEDURE — 0503F POSTPARTUM CARE VISIT: CPT

## 2023-08-16 RX ORDER — DOCUSATE SODIUM 100 MG/1
100 CAPSULE, LIQUID FILLED ORAL 2 TIMES DAILY
Status: DISCONTINUED | OUTPATIENT
Start: 2023-08-16 | End: 2023-08-17 | Stop reason: HOSPADM

## 2023-08-16 RX ORDER — FERROUS SULFATE 325(65) MG
325 TABLET ORAL
Status: DISCONTINUED | OUTPATIENT
Start: 2023-08-16 | End: 2023-08-17 | Stop reason: HOSPADM

## 2023-08-16 RX ADMIN — IBUPROFEN 600 MG: 600 TABLET ORAL at 22:04

## 2023-08-16 RX ADMIN — ACETAMINOPHEN 650 MG: 325 TABLET ORAL at 18:19

## 2023-08-16 RX ADMIN — ACETAMINOPHEN 650 MG: 325 TABLET ORAL at 06:26

## 2023-08-16 RX ADMIN — DOCUSATE SODIUM 100 MG: 100 CAPSULE, LIQUID FILLED ORAL at 22:04

## 2023-08-16 RX ADMIN — OXYCODONE HYDROCHLORIDE 5 MG: 5 TABLET ORAL at 09:28

## 2023-08-16 RX ADMIN — ACETAMINOPHEN 650 MG: 325 TABLET ORAL at 00:28

## 2023-08-16 RX ADMIN — IBUPROFEN 600 MG: 600 TABLET ORAL at 16:14

## 2023-08-16 RX ADMIN — OXYCODONE HYDROCHLORIDE 5 MG: 5 TABLET ORAL at 13:16

## 2023-08-16 RX ADMIN — PRENATAL VITAMINS-IRON FUMARATE 27 MG IRON-FOLIC ACID 0.8 MG TABLET 1 TABLET: at 09:29

## 2023-08-16 RX ADMIN — DOCUSATE SODIUM 100 MG: 100 CAPSULE, LIQUID FILLED ORAL at 09:29

## 2023-08-16 RX ADMIN — ACETAMINOPHEN 650 MG: 325 TABLET ORAL at 13:16

## 2023-08-16 RX ADMIN — SIMETHICONE 80 MG: 80 TABLET, CHEWABLE ORAL at 22:04

## 2023-08-16 RX ADMIN — OXYCODONE HYDROCHLORIDE 10 MG: 10 TABLET ORAL at 00:28

## 2023-08-16 RX ADMIN — IBUPROFEN 600 MG: 600 TABLET ORAL at 04:51

## 2023-08-16 RX ADMIN — IBUPROFEN 600 MG: 600 TABLET ORAL at 09:29

## 2023-08-16 RX ADMIN — SIMETHICONE 80 MG: 80 TABLET, CHEWABLE ORAL at 09:29

## 2023-08-16 RX ADMIN — FERROUS SULFATE TAB 325 MG (65 MG ELEMENTAL FE) 325 MG: 325 (65 FE) TAB at 13:16

## 2023-08-16 RX ADMIN — OXYCODONE HYDROCHLORIDE 5 MG: 5 TABLET ORAL at 22:06

## 2023-08-16 NOTE — PROGRESS NOTES
Postpartum Progress Note    Patient name: Jeanne Bauman  YOB: 1980   MRN: [id number]  Referring Provider: Petey Mccarty MD  Admission Date: 2023  Date of Service: 2023    ID: 43 y.o.     Diagnosis:   S/p  delivery 2 Days Post-Op     Pregnancy       Subjective:      No complaints.  Mild lochia.  Ambulating, voiding, tolerating diet. Passing flatus. Urinary hesitancy improved.  Pain well controlled.  The patient is currently breastfeeding.   This baby is a female. Hypoglycemia improving.     Objective:      Vital signs:  Vital Signs Range for the last 24 hours  Temperature: Temp:  [97.7 øF (36.5 øC)-98.5 øF (36.9 øC)] 98 øF (36.7 øC)   Temp Source: Temp src: Oral   BP: BP: ()/(49-66) 118/66   Pulse: Heart Rate:  [67-81] 79   Respirations: Resp:  [18] 18   Weight: 67.6 kg (149 lb)     General: Alert & oriented x4, in no apparent distress  Abdomen: soft, nontender  Uterus: firm, nontender  Incision: clean, dry, intact, suture/skin glue  Extremities: nontender; no edema      Labs:  Lab Results   Component Value Date    WBC 8.23 08/15/2023    HGB 9.2 (L) 08/15/2023    HCT 27.6 (L) 08/15/2023    MCV 92.6 08/15/2023     08/15/2023     Results from last 7 days   Lab Units 23  1044   ABO TYPING  B   RH TYPING  Positive     External Prenatal Results       Pregnancy Outside Results - Transcribed From Office Records - See Scanned Records For Details       Test Value Date Time    ABO  B  23 1044    Rh  Positive  23 1044    Antibody Screen  Negative  23 1044       Negative  23 1113       Negative  01/10/23 1117    Varicella IgG       Rubella  1.45 index 01/10/23 1117    Hgb  9.2 g/dL 08/15/23 0632       13.4 g/dL 23 1044       11.6 g/dL 23 1113       13.2 g/dL 01/10/23 1117    Hct  27.6 % 08/15/23 0632       39.6 % 23 1044       33.6 % 23 1113       39.4 % 01/10/23 1117    Glucose Fasting GTT  74 mg/dL 21  1416    Glucose Tolerance Test 1 hour  111 mg/dL 21 1416    Glucose Tolerance Test 3 hour  58 mg/dL 21 1416    Gonorrhea (discrete)  Negative  01/10/23 111    Chlamydia (discrete)  Negative  01/10/23 1117    RPR  Non Reactive  01/10/23 1117    VDRL       Syphilis Antibody       HBsAg  Negative  01/10/23 1117    Herpes Simplex Virus PCR       Herpes Simplex VIrus Culture       HIV  Non Reactive  01/10/23 1117    Hep C RNA Quant PCR       Hep C Antibody  <0.1 s/co ratio 01/10/23 111    AFP  54.3 ng/mL 23 0934    Group B Strep  No Group B Streptococcus isolated  23 1907    GBS Susceptibility to Clindamycin       GBS Susceptibility to Erythromycin       Fetal Fibronectin       Genetic Testing, Maternal Blood                 Drug Screening       Test Value Date Time    Urine Drug Screen       Amphetamine Screen  Negative ng/mL 01/10/23 1117    Barbiturate Screen  Negative ng/mL 01/10/23 1117    Benzodiazepine Screen  Negative ng/mL 01/10/23 1117    Methadone Screen  Negative ng/mL 01/10/23 1117    Phencyclidine Screen  Negative ng/mL 01/10/23 1117    Opiates Screen       THC Screen       Cocaine Screen       Propoxyphene Screen  Negative ng/mL 01/10/23 1117    Buprenorphine Screen       Methamphetamine Screen       Oxycodone Screen       Tricyclic Antidepressants Screen                 Legend    ^: Historical                            Assessment/Plan:      2 Days Post-Op s/p Procedure(s):   SECTION PRIMARY WITH SALPINGECTOMY  1. S/p  delivery: Continue postoperative care.  Doing well.  2. Infant feeding: Supportive care.  The patient is currently breastfeeding.  3. Post-op Anemia: asymptomatic    Heather Richardson MD, PGY-1

## 2023-08-16 NOTE — PROGRESS NOTES
2023    Name:Jeanne Bauman    MR#:[id number]     PROGRESS NOTE:  Post-Op Day 2 S/P    HD:2    Subjective   43 y.o. yo Female  s/p CS at 39w0d doing well. Pain well controlled. Tolerating regular diet and having flatus. Lochia normal.       Pregnancy        Objective    Vitals  Temp:  Temp:  [97.7 øF (36.5 øC)-98.6 øF (37 øC)] 98.6 øF (37 øC)  Temp src: Oral  BP:  BP: ()/(49-66) 94/53  Pulse:  Heart Rate:  [67-81] 72  RR:   Resp:  [16-18] 16    General Awake, alert, no distress  Abdomen Soft, non-distended, fundus firm, below umbilicus, appropriately tender  Incision  Intact, no erythema or exudate  Extremities Calves NT bilaterally     I/O last 3 completed shifts:  In: -   Out: 3725 [Urine:3725]    LABS:   Lab Results   Component Value Date    WBC 8.23 08/15/2023    HGB 9.2 (L) 08/15/2023    HCT 27.6 (L) 08/15/2023    MCV 92.6 08/15/2023     08/15/2023       Infant: female       Assessment   1.  POD 2, PCS with Bilateral Salpingectomy. Doing well.    2.  Anemic, Asymptomatic, VSS, EBL 720cc    Plan:   Doing well.  Routine postoperative care. Advance.   Begin PO iron with constipation precautions.   AM H/H        Active Problems:   None      NETTE Lucia  2023 10:18 EDT

## 2023-08-17 VITALS
RESPIRATION RATE: 16 BRPM | SYSTOLIC BLOOD PRESSURE: 95 MMHG | HEART RATE: 75 BPM | WEIGHT: 149 LBS | TEMPERATURE: 98.1 F | BODY MASS INDEX: 23.95 KG/M2 | OXYGEN SATURATION: 100 % | DIASTOLIC BLOOD PRESSURE: 58 MMHG | HEIGHT: 66 IN

## 2023-08-17 PROBLEM — O09.529 AMA (ADVANCED MATERNAL AGE) MULTIGRAVIDA 35+: Status: RESOLVED | Noted: 2021-01-20 | Resolved: 2023-08-17

## 2023-08-17 PROBLEM — O44.43 LOW-LYING PLACENTA WITHOUT HEMORRHAGE, THIRD TRIMESTER: Status: RESOLVED | Noted: 2023-07-26 | Resolved: 2023-08-17

## 2023-08-17 PROBLEM — Z34.90 PREGNANCY: Status: RESOLVED | Noted: 2020-09-16 | Resolved: 2023-08-17

## 2023-08-17 PROBLEM — Z34.80 SUPERVISION OF OTHER NORMAL PREGNANCY, ANTEPARTUM: Status: RESOLVED | Noted: 2023-06-14 | Resolved: 2023-08-17

## 2023-08-17 PROBLEM — O44.20 MARGINAL PLACENTA PREVIA: Status: RESOLVED | Noted: 2023-05-03 | Resolved: 2023-08-17

## 2023-08-17 LAB
HCT VFR BLD AUTO: 26.7 % (ref 34–46.6)
HGB BLD-MCNC: 8.9 G/DL (ref 12–15.9)

## 2023-08-17 PROCEDURE — 85018 HEMOGLOBIN: CPT

## 2023-08-17 PROCEDURE — 85014 HEMATOCRIT: CPT

## 2023-08-17 RX ORDER — OXYCODONE HYDROCHLORIDE 5 MG/1
5 TABLET ORAL EVERY 8 HOURS PRN
Qty: 20 TABLET | Refills: 0 | Status: SHIPPED | OUTPATIENT
Start: 2023-08-17 | End: 2023-08-24

## 2023-08-17 RX ORDER — IBUPROFEN 600 MG/1
600 TABLET ORAL EVERY 6 HOURS
Qty: 60 TABLET | Refills: 1 | Status: SHIPPED | OUTPATIENT
Start: 2023-08-17

## 2023-08-17 RX ADMIN — OXYCODONE HYDROCHLORIDE 5 MG: 5 TABLET ORAL at 06:49

## 2023-08-17 RX ADMIN — ACETAMINOPHEN 650 MG: 325 TABLET ORAL at 00:57

## 2023-08-17 RX ADMIN — IBUPROFEN 600 MG: 600 TABLET ORAL at 03:56

## 2023-08-17 RX ADMIN — IBUPROFEN 600 MG: 600 TABLET ORAL at 10:30

## 2023-08-17 RX ADMIN — OXYCODONE HYDROCHLORIDE 5 MG: 5 TABLET ORAL at 11:28

## 2023-08-17 RX ADMIN — DOCUSATE SODIUM 100 MG: 100 CAPSULE, LIQUID FILLED ORAL at 08:21

## 2023-08-17 RX ADMIN — FERROUS SULFATE TAB 325 MG (65 MG ELEMENTAL FE) 325 MG: 325 (65 FE) TAB at 08:21

## 2023-08-17 RX ADMIN — PRENATAL VITAMINS-IRON FUMARATE 27 MG IRON-FOLIC ACID 0.8 MG TABLET 1 TABLET: at 08:21

## 2023-08-17 RX ADMIN — ACETAMINOPHEN 650 MG: 325 TABLET ORAL at 06:49

## 2023-08-17 NOTE — LACTATION NOTE
08/16/23 1715   Maternal Information   Date of Referral 08/16/23  (saw mom at this time and at 1510)   Person Making Referral lactation consultant  (follow up consult)   Maternal Reason for Referral   (experienced breastfeeding mom who  other babies for 2, 1 and 4 years and child is 4 years old and still breastfeeding)   Infant Reason for Referral hypoglycemia  (baby has had several episodes of low blood sugar';  ordered by peds to supplement with 10 ml formula first and then expressed breast milk)   Maternal Assessment   Breast Size Issue none   Breast Shape Bilateral:;round   Breast Density Bilateral:;soft   Nipples Bilateral:;everted   Left Nipple Symptoms intact;nontender   Right Nipple Symptoms intact;nontender   Maternal Infant Feeding   Maternal Emotional State independent   Infant Positioning clutch/football  (mom doing football hold with little support to baby--helped get baby tighter and closer to breast during feeding;  encouraged more support to baby and breast during feedings)   Signs of Milk Transfer   (hard to evaluate feeding but appeared to be narrower gape and shallower jaw excursions than expected with deep latch)   Pain with Feeding no   Comfort Measures Before/During Feeding   (helped with position and then mom took baby off breast--she will decide if she wants to work on changing position later)   Latch Assistance none needed   Support Person Involvement verbally supports mother   Milk Expression/Equipment   Breast Pump Type double electric, personal  (mom using personal spectra pump--got 20 ml with last pumping)   Breast Pump Flange Type hard   Breast Pump Flange Size 24 mm   Breast Pumping   Breast Pumping Interventions post-feed pumping encouraged  (encouraged to pump after feedings; and give pumped milk to baby;  gave syringes and discussed how to finger/syringe feed; gave Dr Razo bottle and transition nipple. To use Dr Burgos's bottle, if baby won't  take milk with  finger/syringe feeding)     Mom feeling frustrated because of mixed messages about how to feed baby and manage breastfeeding/supplementation/pumping.Listened to concerns and answered questions.     Discussed milk supply, latch and position, pumping, supplementing, hypoglycemia. To call lactation services, if mom wants help with a breastfeeding tomorrow or if there are questions or concerns.

## 2023-08-17 NOTE — PLAN OF CARE
Problem: Adult Inpatient Plan of Care  Goal: Plan of Care Review  Outcome: Met  Goal: Patient-Specific Goal (Individualized)  Outcome: Met  Goal: Absence of Hospital-Acquired Illness or Injury  Outcome: Met  Intervention: Identify and Manage Fall Risk  Recent Flowsheet Documentation  Taken 8/17/2023 0825 by Laverne Bledsoe RN  Safety Promotion/Fall Prevention: safety round/check completed  Intervention: Prevent Skin Injury  Recent Flowsheet Documentation  Taken 8/17/2023 0825 by Laverne Bledsoe RN  Body Position: sitting up in bed  Intervention: Prevent and Manage VTE (Venous Thromboembolism) Risk  Recent Flowsheet Documentation  Taken 8/17/2023 0825 by Laverne Bledsoe RN  Activity Management: up ad marc  Goal: Optimal Comfort and Wellbeing  Outcome: Met  Intervention: Monitor Pain and Promote Comfort  Recent Flowsheet Documentation  Taken 8/17/2023 1031 by Laverne Bledsoe RN  Pain Management Interventions: around-the-clock dosing utilized  Goal: Readiness for Transition of Care  Outcome: Met   Goal Outcome Evaluation:

## 2023-08-17 NOTE — DISCHARGE SUMMARY
Discharge Summary    Date of Admission: 2023  Date of Discharge:  2023      Patient: Jeanne Bauman      MR#:[id number]    Primary Surgeon/OB: Petey Mccarty MD    Discharge Surgeon/OB: /Sharlene PERDUE    Presenting Problem/History of Present Illness  Pregnancy [Z34.90]     Patient Active Problem List    Diagnosis     Postpartum care following  delivery [Z39.2]     Anxiety [F41.9]     Heart palpitations [R00.2]     FH: factor V Leiden mutation [Z83.2]     Insomnia [G47.00]     Abnormal mammogram of left breast [R92.8]     Lipoma of anterior chest wall [D17.1]          Discharge Diagnosis:  section at 39w0d    Procedures:  , Low Transverse     2023    8:03 AM            Discharge Date: 2023; Discharge Time: 10:07 EDT        Hospital Course  Patient is a 43 y.o. female  at 39w0d status post  section with uneventful postoperative recovery.  Patient was advanced to regular diet on postoperative day#1.  On discharge, ambulating, tolerating a regular diet without any difficulties and her incision is dry, clean and intact.     Infant:   female  fetus 3745 g (8 lb 4.1 oz)  with Apgar scores of 8 , 9  at five minutes.    Condition on Discharge:  Stable    Vital Signs  Temp:  [97.6 øF (36.4 øC)-98.8 øF (37.1 øC)] 98.1 øF (36.7 øC)  Heart Rate:  [75-87] 75  Resp:  [16-20] 16  BP: ()/(55-58) 95/58    Lab Results   Component Value Date    WBC 8.23 08/15/2023    HGB 8.9 (L) 2023    HCT 26.7 (L) 2023    MCV 92.6 08/15/2023     08/15/2023       Discharge Disposition  Home or Self Care    Discharge Medications     Discharge Medications        New Medications        Instructions Start Date   ibuprofen 600 MG tablet  Commonly known as: ADVIL,MOTRIN   600 mg, Oral, Every 6 Hours             Continue These Medications        Instructions Start Date   BABY ASPIRIN PO   81 mg, Oral, Daily      Calcium 200 MG tablet   200 mg, Oral,  Daily      calcium carbonate 500 MG chewable tablet  Commonly known as: TUMS   2 tablets, Oral, As Needed      docusate sodium 100 MG capsule  Commonly known as: COLACE   100 mg, Oral, 2 Times Daily      famotidine 20 MG tablet  Commonly known as: PEPCID   20 mg, Oral, 2 Times Daily      ferrous sulfate 325 (65 FE) MG tablet   325 mg, Oral, Daily With Breakfast      prenatal (CLASSIC) vitamin  tablet  Generic drug: prenatal vitamin   1 tablet, Oral, Daily      PROBIOTIC BLEND PO   1 dose, Oral, Daily                   Follow-up Appointments  No future appointments.  Additional Instructions for the Follow-ups that You Need to Schedule       Call MD With Problems / Concerns   As directed      Instructions: Discussed changes in postpartum mood-anxiety, depression, sadness to call office for evaluation as soon as symptoms arise even if before 6 week appointment. Monitor for excessive bleeding >1 pad/hr for several hours, dizziness, syncope, fever or changes in blood pressure.    Order Comments: Instructions: Discussed changes in postpartum mood-anxiety, depression, sadness to call office for evaluation as soon as symptoms arise even if before 6 week appointment. Monitor for excessive bleeding >1 pad/hr for several hours, dizziness, syncope, fever or changes in blood pressure.         Discharge Follow-up with Specified Provider:  or NETTE; 2 Weeks   As directed      To:  or NETTE   Follow Up: 2 Weeks                NETTE Alfaro  08/17/23  10:07 EDT  Csd

## 2023-08-31 ENCOUNTER — TELEPHONE (OUTPATIENT)
Dept: OBSTETRICS AND GYNECOLOGY | Facility: CLINIC | Age: 43
End: 2023-08-31

## 2023-08-31 NOTE — TELEPHONE ENCOUNTER
[facility] staff attempted to follow warm transfer process and was unsuccessful     Caller: Jeanne Bauman    Relationship to patient: Self    Best call back number: [phone]    Patient is needing: PT CALLED IN TO SCHEDULE HER 2 WEEK POSTPARTUM APPT. NO AVAILABILITY WITHIN SCHEDULING TIME FRAME.

## 2023-08-31 NOTE — TELEPHONE ENCOUNTER
Provider: DR. WILLIAMSON  Caller: SARA HERNANDEZ  Relationship to Patient: SELF  Pharmacy:   Phone Number: [phone]  Reason for Call: APPOINTMENT  When was the patient last seen: 08.09.23      PATIENT CALLING IN TO SCHEDULE HER 2 WEEK POSTPARTUM FOLLOW UP WITH DR. WILLIAMSON. PATIENT HAD BABY ON 08.14.23. HUB DOES NOT HAVE AVAILABILITY.  PLEASE CALL PATIENT TO SCHEDULE.    PATIENT CAN BE REACHED [phone]    THANK YOU

## 2023-09-01 ENCOUNTER — POSTPARTUM VISIT (OUTPATIENT)
Dept: OBSTETRICS AND GYNECOLOGY | Facility: CLINIC | Age: 43
End: 2023-09-01
Payer: COMMERCIAL

## 2023-09-01 VITALS
WEIGHT: 138 LBS | HEIGHT: 66 IN | BODY MASS INDEX: 22.18 KG/M2 | SYSTOLIC BLOOD PRESSURE: 104 MMHG | DIASTOLIC BLOOD PRESSURE: 64 MMHG

## 2023-09-01 NOTE — PROGRESS NOTES
"      Chief Complaint   Patient presents with    Postpartum Care     2 weeks       Postpartum Visit         Jeanne Bauman is a 43 y.o.  who presents today for a 2 week(s) postpartum check.    This is her first csection and she is unsure what is considered normal.  She reports a LLQ burning pain.  She still takes Motrin as needed.  She denies bowel changes, urinary problems. Odor, fever.    Her bleeding increased around 2w postop.  It is like a period now.        , Low Transverse    Information for the patient's :  Sharyn Bauman [[id number]]   2023   female   Sharyn Bauman   3745 g (8 lb 4.1 oz)   Gestational Age: 39w0d    Baby Discharged with Mom  Delivering MD: Petey Mccarty MD.    Pregnancy complications:  none    Patient reports her incision is clean, dry, intact. Patient describes vaginal bleeding as moderate.  She is breast feeding. Patient denies bowel or bladder issues.    She received Bilateral Salpingectomy at time of delivery for contraception.    Patient denies postpartum depression. Postpartum Depression Screening Questionnaire: completed= 11.  She reports mild anxiety and denies depression; she feels she is doing well and it will improve.  Her 3yo is not sleeping well which is causing her stress.  She denies HI/SI.      The additional following portions of the patient's history were reviewed and updated as appropriate: allergies, current medications, past family history, past medical history, past social history, past surgical history, and problem list.      Review of Systems    I have reviewed and agree with the HPI, ROS, and historical information as entered above. Lisa Silver, APRN      Objective   /64   Ht 167.6 cm (65.98\")   Wt 62.6 kg (138 lb)   LMP 2022 (Exact Date)   Breastfeeding Yes   BMI 22.28 kg/mý     Physical Exam  Vitals and nursing note reviewed.   Constitutional:       General: She is not in acute distress.     " Appearance: Normal appearance. She is not ill-appearing.   Pulmonary:      Effort: Pulmonary effort is normal. No respiratory distress.   Abdominal:      General: There is no distension.      Palpations: Abdomen is soft. There is no mass.      Tenderness: There is no guarding or rebound.      Hernia: No hernia is present.      Comments: Incision healing well without redness or drainage   Skin:     General: Skin is warm and dry.   Neurological:      Mental Status: She is alert and oriented to person, place, and time.   Psychiatric:         Mood and Affect: Mood normal.         Behavior: Behavior normal.            Assessment and Plan    Problem List Items Addressed This Visit    None  Visit Diagnoses       Postpartum follow-up    -  Primary            S/p , 2 week(s) postpartum.  Doing well.    Continued pelvic rest with a return to driving and light physical activity.  Baby doing well.  Contraception:  bilateral salpingectomy  Return in about 4 weeks (around 2023) for pp.  Call prn increase in anxiety/mood concerns.  PT v/u.    Lisa Silver, APRN  2023

## 2023-09-19 ENCOUNTER — POSTPARTUM VISIT (OUTPATIENT)
Dept: OBSTETRICS AND GYNECOLOGY | Facility: CLINIC | Age: 43
End: 2023-09-19
Payer: COMMERCIAL

## 2023-09-19 VITALS
DIASTOLIC BLOOD PRESSURE: 70 MMHG | SYSTOLIC BLOOD PRESSURE: 102 MMHG | HEIGHT: 66 IN | WEIGHT: 139.6 LBS | BODY MASS INDEX: 22.43 KG/M2

## 2023-09-19 DIAGNOSIS — L08.9 SKIN INFECTION: Primary | ICD-10-CM

## 2023-09-19 LAB
ERYTHROCYTE [DISTWIDTH] IN BLOOD BY AUTOMATED COUNT: 11.2 % (ref 12.3–15.4)
HCT VFR BLD AUTO: 37.1 % (ref 34–46.6)
HGB BLD-MCNC: 12.4 G/DL (ref 12–15.9)
MCH RBC QN AUTO: 29.1 PG (ref 26.6–33)
MCHC RBC AUTO-ENTMCNC: 33.4 G/DL (ref 31.5–35.7)
MCV RBC AUTO: 87.1 FL (ref 79–97)
PLATELET # BLD AUTO: 259 10*3/MM3 (ref 140–450)
RBC # BLD AUTO: 4.26 10*6/MM3 (ref 3.77–5.28)
WBC # BLD AUTO: 8.05 10*3/MM3 (ref 3.4–10.8)

## 2023-09-19 RX ORDER — CEPHALEXIN 500 MG/1
500 CAPSULE ORAL 2 TIMES DAILY
Qty: 14 CAPSULE | Refills: 0 | Status: SHIPPED | OUTPATIENT
Start: 2023-09-19 | End: 2023-09-26

## 2023-09-19 NOTE — PROGRESS NOTES
Chief Complaint   Patient presents with    Postpartum Care       Postpartum problems visit       Jeanne Bauman is a 43 y.o.  who is s/p , Low Transverse    Information for the patient's :  Sharyn Bauman [[id number]]   2023   female   Sharyn Bauman   3745 g (8 lb 4.1 oz)   Gestational Age: 39w0d    Baby Discharged: Discharged with Mom  Delivering Physician: Petey Mccarty MD    She presents today for concerns of incisional infection.    Pt reports pain in her abdomen about two inches above and below her incision. She describes the pain as an aching and sore pain with occasional shooting pain sensations. She rates the pain a 6-7 on a 1-10 pain scale. She states her incision sometimes feels like it burns. Pt states she had a fever of 100.2 yesterday 23 at about 1700. She reports taking Advil to help with her pain and fever, states it helps her fever but it does not relieve her pain. She reports having a bowel movement this morning 23. She denies any discharge, odor, shortness of breath, or cough.     Patient describes bleeding as light.  Patient is breast feeding.  denies bowel or bladder issues.    Patient denies postpartum depression. Postpartum Depression Screening Questionnaire: 5, no treatment indicated.    The additional following portions of the patient's history were reviewed and updated as appropriate: allergies, current medications, past family history, past medical history, past social history, past surgical history, and problem list.      Review of Systems   Constitutional: Negative.    HENT: Negative.     Eyes: Negative.    Respiratory: Negative.     Cardiovascular: Negative.    Gastrointestinal: Negative.    Endocrine: Negative.    Genitourinary: Negative.    Musculoskeletal: Negative.    Skin: Negative.    Allergic/Immunologic: Negative.    Neurological: Negative.    Hematological: Negative.    Psychiatric/Behavioral: Negative.    "    I have reviewed and agree with the HPI, ROS, and historical information as entered above. Petey Mccarty MD      Objective   /70 (BP Location: Right arm, Patient Position: Sitting, Cuff Size: Adult)   Ht 167.6 cm (66\")   Wt 63.3 kg (139 lb 9.6 oz)   LMP 2022 (Exact Date)   Breastfeeding Yes   BMI 22.53 kg/m²     Physical Exam  Vitals reviewed.   Constitutional:       Appearance: Normal appearance. She is normal weight.   HENT:      Head: Normocephalic and atraumatic.   Abdominal:      General: Abdomen is flat. Bowel sounds are normal.      Palpations: Abdomen is soft.      Comments: Incision is clean, dry, and intact  Swelling present superficial from deep fascial knot   Skin:     General: Skin is warm and dry.   Neurological:      Mental Status: She is alert and oriented to person, place, and time.   Psychiatric:         Behavior: Behavior normal.            Assessment and Plan    Problem List Items Addressed This Visit          Gravid and     Postpartum care following  delivery     Other Visit Diagnoses       Skin infection    -  Primary    Relevant Medications    cephalexin (Keflex) 500 MG capsule    Other Relevant Orders    CBC (No Diff)            S/p , 2 weeks postpartum.  Doing well.    Continued pelvic rest.   Contraception: contraceptive methods: Tubal ligation performed at time of   Return in about 1 week (around 2023) for move her from [facility] on Friday to me on TUE/WED.    Petey Mccarty MD  2023   "

## 2023-09-26 ENCOUNTER — POSTPARTUM VISIT (OUTPATIENT)
Dept: OBSTETRICS AND GYNECOLOGY | Facility: CLINIC | Age: 43
End: 2023-09-26
Payer: COMMERCIAL

## 2023-09-26 VITALS
BODY MASS INDEX: 22.34 KG/M2 | HEIGHT: 66 IN | WEIGHT: 139 LBS | SYSTOLIC BLOOD PRESSURE: 100 MMHG | DIASTOLIC BLOOD PRESSURE: 76 MMHG

## 2023-09-26 PROCEDURE — 0503F POSTPARTUM CARE VISIT: CPT | Performed by: OBSTETRICS & GYNECOLOGY

## 2023-09-26 NOTE — PROGRESS NOTES
Chief Complaint   Patient presents with    Postpartum Care     6 wks       Postpartum Visit         Jeanne Bauman is a 43 y.o.  who presents today for a 6 week(s) postpartum check.     C/S: elective     , Low Transverse    Information for the patient's :  Sharyn Bauman [[id number]]   2023   female   Sharyn Bauman   3745 g (8 lb 4.1 oz)   Gestational Age: 39w0d        Baby Discharged: Discharged with Mom  Delivering Physician: Petey Mccarty MD    At the time of delivery were you diagnosed with any of the following: None. The incision is healing well. Patient describes vaginal bleeding as light.  Patient is breast feeding.  She desires contraceptive methods: bilateral salpingectomy  for contraception.  Patient denies bowel or bladder issues.      Patient denies postpartum depression. Postpartum Depression Screening Questionnaire: 2, no treatment indicated.      Last Pap : 20. Results: negative. HPV: negative.   Last Completed Pap Smear       This patient has no relevant Health Maintenance data.              The additional following portions of the patient's history were reviewed and updated as appropriate: allergies, current medications, past family history, past medical history, past social history, past surgical history, and problem list.    Review of Systems   Constitutional: Negative.    HENT: Negative.     Eyes: Negative.    Respiratory: Negative.     Cardiovascular: Negative.    Gastrointestinal: Negative.    Endocrine: Negative.    Genitourinary: Negative.    Musculoskeletal: Negative.    Skin:  Positive for wound (c section).   Allergic/Immunologic: Negative.    Neurological: Negative.    Hematological: Negative.    Psychiatric/Behavioral: Negative.     All other systems reviewed and are negative.     I have reviewed and agree with the HPI, ROS, and historical information as entered above. Petey Mccarty MD      /76 (BP  "Location: Right arm, Patient Position: Sitting, Cuff Size: Adult)   Ht 167.6 cm (66\")   Wt 63 kg (139 lb)   LMP 2022 (Exact Date)   Breastfeeding Yes   BMI 22.44 kg/m²     Physical Exam  Vitals reviewed. Exam conducted with a chaperone present.   Constitutional:       Appearance: Normal appearance. She is normal weight.   HENT:      Head: Normocephalic and atraumatic.   Abdominal:      General: Abdomen is flat. Bowel sounds are normal.      Palpations: Abdomen is soft.   Genitourinary:     General: Normal vulva.      Vagina: Normal.      Cervix: Normal.      Uterus: Normal.       Adnexa: Right adnexa normal and left adnexa normal.   Skin:     General: Skin is warm and dry.   Neurological:      Mental Status: She is alert and oriented to person, place, and time.   Psychiatric:         Mood and Affect: Mood normal.         Behavior: Behavior normal.           Assessment and Plan    Problem List Items Addressed This Visit    None  Visit Diagnoses       Postpartum follow-up    -  Primary    Relevant Orders    LIQUID-BASED PAP SMEAR WITH HPV GENOTYPING IF ASCUS ([facility])            S/p , 6 week(s) postpartum.  Doing well.    Return to normal physical activity.  No pelvic restrictions.   Baby doing well.  Breastfeeding going well.  No si/sx of postpartum depression  Contraception: contraceptive methods: Tubal ligation  Return in about 1 year (around 2024) for Annual physical.     Petey Mccarty MD  2023   "

## 2023-09-27 LAB — REF LAB TEST METHOD: NORMAL

## 2023-12-13 ENCOUNTER — E-VISIT (OUTPATIENT)
Dept: ADMINISTRATIVE | Facility: OTHER | Age: 43
End: 2023-12-13
Payer: COMMERCIAL

## 2023-12-13 NOTE — E-VISIT ESCALATED
Chief Complaint: Breastfeeding concerns   Patient was shown the following escalation message:   Photos are required to get care with this interview   We need to see your breasts to provide the best treatment. Since you'd rather not send photos, you should speak with a provider to get care.   Keep breastfeeding. Emptying your breasts regularly will prevent more serious conditions that can occur when breastfeeding.   ----------   Patient Interview Transcript:   Which breast is affected? Select one.    Right breast   Not selected:    Left breast    Both breasts   Which part of your breast is affected?    (1) Nipple    (3) Breast tissue (the rest of the breast outside the areola)   Not selected:    (2) Areola (the area immediately surrounding the nipple)   Which symptoms do you have on your nipple or areola? Select all that apply.    Pain    Swelling    Redness   Not selected:    Bloody nipple discharge    White dot on the nipple    Sensitivity, itching, or burning    Cracking    Shiny or flaky skin    Tiny blisters    Warmth    None of these   When do you have pain in your nipple? Select all that apply.    Throughout the nursing session   Not selected:    Only during the first 30 seconds to 1 minute of nursing    After nursing   Do you have deep, stabbing pain that shoots from the nipple through the breast to the chest wall? Select one.    Yes   Not selected:    No, not that I've noticed   Which symptoms do you have in your breast? Select all that apply.    Pain    Swelling    Skin redness    Warmth to the touch    Thickening of the breast tissue or firm lump in part of the breast   Not selected:    Thickening and/or firmness of the entire breast    None of these   How long have you had your current symptoms? Select one.    1 to 3 days   Not selected:    Less than 24 hours    4 to 5 days    More than 5 days   To recommend the best treatment for you, we need to see photos of your breasts.   [image:  [email or web address]]   Take a close-up photo of the affected area on your breast.   [image: [email or web address]]   Take a photo showing the location of the affected area.   If you choose not to send photos, you'll need to speak with a provider to get care.    Select one.    I'd rather not send photos. Show me my care options.   Not selected:    OK, I'll send photos   ----------   Medical history   Medical history data does not currently exist for this patient.

## 2023-12-14 DIAGNOSIS — N61.0 ACUTE MASTITIS: Primary | ICD-10-CM

## 2024-09-27 ENCOUNTER — TELEPHONE (OUTPATIENT)
Dept: OBSTETRICS AND GYNECOLOGY | Facility: CLINIC | Age: 44
End: 2024-09-27

## 2024-09-27 NOTE — TELEPHONE ENCOUNTER
Caller:Jeanne Bauman     Relationship:  SELF    Best call back number:[phone]     PATIENT CALLED REQUESTING TO CANCEL SAME DAY APPT.    Did the patient call AFTER the start time of their scheduled appointment?  NO    Was the patient's appointment rescheduled?  YES-10/08/24    Any additional information:PATIENT CALLED TO RESCHEDULE TODAY'S ANNUAL AT 11:30 AM WITH  DUE TO NOT FEELING WELL.

## (undated) DEVICE — SOL IRR H2O BTL 1000ML STRL

## (undated) DEVICE — TBG PENCL TELESCP MEGADYNE SMOKE EVAC 10FT

## (undated) DEVICE — SUT VIC 3/0 CT1 27IN DYED J338H

## (undated) DEVICE — ADHS SKIN PREMIERPRO EXOFIN TOPICAL HI/VISC .5ML

## (undated) DEVICE — SOL IRR NACL 0.9PCT BT 1000ML

## (undated) DEVICE — VIOLET BRAIDED (POLYGLACTIN 910), SYNTHETIC ABSORBABLE SUTURE: Brand: COATED VICRYL

## (undated) DEVICE — SUT VIC 2/0 CT1 27IN J259H

## (undated) DEVICE — TRAP FLD MINIVAC MEGADYNE 100ML

## (undated) DEVICE — PATIENT RETURN ELECTRODE, SINGLE-USE, CONTACT QUALITY MONITORING, ADULT, WITH 9FT CORD, FOR PATIENTS WEIGING OVER 33LBS. (15KG): Brand: MEGADYNE

## (undated) DEVICE — TRY SPINE BLCK WHITACRE 25G 3X5IN

## (undated) DEVICE — SUT MNCRYL 3/0 27L Y523H BX/36

## (undated) DEVICE — APPL CHLORAPREP TINTED 26ML TEAL

## (undated) DEVICE — MAT PREVALON MOBL TRANSFR AIR W/PAD REPROC 39X81IN

## (undated) DEVICE — GLV SURG PREMIERPRO GAMMEX NEOPRN PF SZ7 GRN

## (undated) DEVICE — 4-PORT MANIFOLD: Brand: NEPTUNE 2

## (undated) DEVICE — PK C/SECT 10

## (undated) DEVICE — CLTH CLENS READYCLEANSE PERI CARE PK/5